# Patient Record
Sex: MALE | Race: WHITE | Employment: FULL TIME | URBAN - NONMETROPOLITAN AREA
[De-identification: names, ages, dates, MRNs, and addresses within clinical notes are randomized per-mention and may not be internally consistent; named-entity substitution may affect disease eponyms.]

---

## 2017-02-13 ENCOUNTER — OFFICE VISIT (OUTPATIENT)
Dept: PULMONOLOGY | Age: 62
End: 2017-02-13

## 2017-02-13 VITALS
WEIGHT: 193.4 LBS | DIASTOLIC BLOOD PRESSURE: 78 MMHG | OXYGEN SATURATION: 96 % | HEIGHT: 66 IN | HEART RATE: 67 BPM | BODY MASS INDEX: 31.08 KG/M2 | SYSTOLIC BLOOD PRESSURE: 122 MMHG

## 2017-02-13 DIAGNOSIS — G47.33 OSA (OBSTRUCTIVE SLEEP APNEA): ICD-10-CM

## 2017-02-13 DIAGNOSIS — G47.09 OTHER INSOMNIA: ICD-10-CM

## 2017-02-13 DIAGNOSIS — G47.33 OBSTRUCTIVE SLEEP APNEA ON CPAP: Primary | ICD-10-CM

## 2017-02-13 DIAGNOSIS — Z99.89 OBSTRUCTIVE SLEEP APNEA ON CPAP: Primary | ICD-10-CM

## 2017-02-13 PROBLEM — G47.00 INSOMNIA: Status: ACTIVE | Noted: 2017-02-13

## 2017-02-13 PROCEDURE — 99213 OFFICE O/P EST LOW 20 MIN: CPT | Performed by: PHYSICIAN ASSISTANT

## 2017-03-28 ENCOUNTER — PATIENT MESSAGE (OUTPATIENT)
Dept: FAMILY MEDICINE CLINIC | Age: 62
End: 2017-03-28

## 2017-03-28 DIAGNOSIS — Z00.00 ANNUAL PHYSICAL EXAM: Primary | ICD-10-CM

## 2017-03-31 LAB
ALBUMIN SERPL-MCNC: 4.3 G/DL (ref 3.2–5.3)
ALK PHOSPHATASE: 89 IU/L (ref 35–121)
ALT SERPL-CCNC: 27 IU/L (ref 5–59)
ANION GAP SERPL CALCULATED.3IONS-SCNC: 11 MMOL/L
AST SERPL-CCNC: 19 IU/L (ref 10–42)
BILIRUB SERPL-MCNC: 1.1 MG/DL (ref 0.2–1.3)
BUN BLDV-MCNC: 22 MG/DL (ref 10–20)
CALCIUM SERPL-MCNC: 9.5 MG/DL (ref 8.7–10.8)
CHLORIDE BLD-SCNC: 103 MMOL/L (ref 95–111)
CHOLESTEROL/HDL RATIO: 3.5
CHOLESTEROL: 165 MG/DL
CO2: 31 MMOL/L (ref 21–32)
CREAT SERPL-MCNC: 1 MG/DL (ref 0.5–1.3)
EGFR AFRICAN AMERICAN: 92
EGFR IF NONAFRICAN AMERICAN: 76
GLUCOSE: 114 MG/DL (ref 70–100)
HDLC SERPL-MCNC: 47 MG/DL (ref 40–60)
LDL CHOLESTEROL CALCULATED: 79 MG/DL
LDL/HDL RATIO: 1.7
POTASSIUM SERPL-SCNC: 4.3 MMOL/L (ref 3.5–5.4)
PSA, ULTRASENSITIVE: 0.97 NG/ML
SODIUM BLD-SCNC: 141 MMOL/L (ref 134–147)
TOTAL PROTEIN: 6.4 G/DL (ref 5.8–8)
TRIGL SERPL-MCNC: 196 MG/DL
VLDLC SERPL CALC-MCNC: 39 MG/DL

## 2017-04-01 LAB
AVERAGE GLUCOSE: 117 MG/DL (ref 66–114)
HBA1C MFR BLD: 5.7 % (ref 4.2–5.8)

## 2017-04-03 DIAGNOSIS — E78.5 HYPERLIPIDEMIA: ICD-10-CM

## 2017-04-03 RX ORDER — ATORVASTATIN CALCIUM 10 MG/1
TABLET, FILM COATED ORAL
Qty: 30 TABLET | Refills: 11 | Status: SHIPPED | OUTPATIENT
Start: 2017-04-03 | End: 2018-04-05 | Stop reason: SDUPTHER

## 2017-04-04 ENCOUNTER — OFFICE VISIT (OUTPATIENT)
Dept: FAMILY MEDICINE CLINIC | Age: 62
End: 2017-04-04

## 2017-04-04 VITALS
HEIGHT: 67 IN | SYSTOLIC BLOOD PRESSURE: 134 MMHG | WEIGHT: 197.4 LBS | TEMPERATURE: 98.4 F | BODY MASS INDEX: 30.98 KG/M2 | HEART RATE: 68 BPM | DIASTOLIC BLOOD PRESSURE: 80 MMHG | RESPIRATION RATE: 16 BRPM

## 2017-04-04 DIAGNOSIS — K21.9 GASTROESOPHAGEAL REFLUX DISEASE, ESOPHAGITIS PRESENCE NOT SPECIFIED: ICD-10-CM

## 2017-04-04 DIAGNOSIS — Z99.89 OBSTRUCTIVE SLEEP APNEA ON CPAP: ICD-10-CM

## 2017-04-04 DIAGNOSIS — G47.33 OBSTRUCTIVE SLEEP APNEA ON CPAP: ICD-10-CM

## 2017-04-04 DIAGNOSIS — E78.5 HYPERLIPIDEMIA, UNSPECIFIED HYPERLIPIDEMIA TYPE: ICD-10-CM

## 2017-04-04 DIAGNOSIS — Z00.00 WELL ADULT EXAM: Primary | ICD-10-CM

## 2017-04-04 DIAGNOSIS — R73.01 IFG (IMPAIRED FASTING GLUCOSE): ICD-10-CM

## 2017-04-04 DIAGNOSIS — N40.1 BENIGN NON-NODULAR PROSTATIC HYPERPLASIA WITH LOWER URINARY TRACT SYMPTOMS: ICD-10-CM

## 2017-04-04 DIAGNOSIS — J30.9 ALLERGIC RHINITIS, UNSPECIFIED ALLERGIC RHINITIS TRIGGER, UNSPECIFIED RHINITIS SEASONALITY: ICD-10-CM

## 2017-04-04 PROCEDURE — 99396 PREV VISIT EST AGE 40-64: CPT | Performed by: FAMILY MEDICINE

## 2017-04-04 ASSESSMENT — ENCOUNTER SYMPTOMS
CONSTIPATION: 1
VOMITING: 0
BLOOD IN STOOL: 1
CHEST TIGHTNESS: 0
NAUSEA: 0
ABDOMINAL PAIN: 0
ANAL BLEEDING: 0
DIARRHEA: 0
SHORTNESS OF BREATH: 0

## 2017-06-07 ENCOUNTER — OFFICE VISIT (OUTPATIENT)
Dept: FAMILY MEDICINE CLINIC | Age: 62
End: 2017-06-07

## 2017-06-07 ENCOUNTER — TELEPHONE (OUTPATIENT)
Dept: FAMILY MEDICINE CLINIC | Age: 62
End: 2017-06-07

## 2017-06-07 VITALS
RESPIRATION RATE: 26 BRPM | HEART RATE: 92 BPM | DIASTOLIC BLOOD PRESSURE: 70 MMHG | BODY MASS INDEX: 30.54 KG/M2 | WEIGHT: 194.6 LBS | TEMPERATURE: 101.3 F | HEIGHT: 67 IN | SYSTOLIC BLOOD PRESSURE: 116 MMHG

## 2017-06-07 DIAGNOSIS — R05.9 COUGH: Primary | ICD-10-CM

## 2017-06-07 DIAGNOSIS — R68.83 CHILLS: ICD-10-CM

## 2017-06-07 DIAGNOSIS — R50.9 FEVER, UNSPECIFIED FEVER CAUSE: ICD-10-CM

## 2017-06-07 LAB
INFLUENZA A ANTIBODY: NEGATIVE
INFLUENZA B ANTIBODY: NEGATIVE

## 2017-06-07 PROCEDURE — 87804 INFLUENZA ASSAY W/OPTIC: CPT | Performed by: FAMILY MEDICINE

## 2017-06-07 PROCEDURE — 99213 OFFICE O/P EST LOW 20 MIN: CPT | Performed by: FAMILY MEDICINE

## 2017-06-07 RX ORDER — HYDROCODONE POLISTIREX AND CHLORPHENIRAMINE POLISTIREX 10; 8 MG/5ML; MG/5ML
5 SUSPENSION, EXTENDED RELEASE ORAL EVERY 12 HOURS PRN
Qty: 120 ML | Refills: 0 | Status: SHIPPED | OUTPATIENT
Start: 2017-06-07 | End: 2018-02-19

## 2017-06-07 RX ORDER — LEVOFLOXACIN 750 MG/1
750 TABLET ORAL DAILY
Qty: 7 TABLET | Refills: 0 | Status: SHIPPED | OUTPATIENT
Start: 2017-06-07 | End: 2017-06-14

## 2017-06-07 ASSESSMENT — ENCOUNTER SYMPTOMS
SHORTNESS OF BREATH: 0
BLOOD IN STOOL: 0
VOMITING: 0
EYE ITCHING: 0
CONSTIPATION: 0
SORE THROAT: 1
RHINORRHEA: 1
EYE DISCHARGE: 0
SINUS PRESSURE: 0
COUGH: 1
EYE PAIN: 0
DIARRHEA: 0
NAUSEA: 0
CHEST TIGHTNESS: 0
ANAL BLEEDING: 0
ABDOMINAL PAIN: 0

## 2017-06-07 ASSESSMENT — PATIENT HEALTH QUESTIONNAIRE - PHQ9
SUM OF ALL RESPONSES TO PHQ9 QUESTIONS 1 & 2: 0
2. FEELING DOWN, DEPRESSED OR HOPELESS: 0
1. LITTLE INTEREST OR PLEASURE IN DOING THINGS: 0
SUM OF ALL RESPONSES TO PHQ QUESTIONS 1-9: 0

## 2017-06-08 ENCOUNTER — TELEPHONE (OUTPATIENT)
Dept: FAMILY MEDICINE CLINIC | Age: 62
End: 2017-06-08

## 2017-07-19 DIAGNOSIS — G47.09 OTHER INSOMNIA: ICD-10-CM

## 2017-07-19 DIAGNOSIS — G47.33 OSA (OBSTRUCTIVE SLEEP APNEA): ICD-10-CM

## 2017-07-19 RX ORDER — ESZOPICLONE 2 MG/1
TABLET, FILM COATED ORAL
Qty: 30 TABLET | Refills: 4 | Status: SHIPPED | OUTPATIENT
Start: 2017-07-19 | End: 2018-02-19 | Stop reason: SDUPTHER

## 2017-10-04 RX ORDER — TAMSULOSIN HYDROCHLORIDE 0.4 MG/1
CAPSULE ORAL
Qty: 180 CAPSULE | Refills: 3 | Status: SHIPPED | OUTPATIENT
Start: 2017-10-04 | End: 2018-11-11 | Stop reason: SDUPTHER

## 2017-12-15 ENCOUNTER — TELEPHONE (OUTPATIENT)
Dept: FAMILY MEDICINE CLINIC | Age: 62
End: 2017-12-15

## 2017-12-15 NOTE — TELEPHONE ENCOUNTER
Would not recommend antibiotic at this time, given duration of symptoms and lack of fever as this sounds viral in etiology. If concerned re Influenza, would need eval in ED to be tested within 48 hours of onset. OK to continue OTC symptomatic treatment otherwise.  ES

## 2017-12-15 NOTE — TELEPHONE ENCOUNTER
Patient is calling in because since Wednesday 12/13/17 he has had just a little sinus drainage and a cough. He said his temperature is running around 99. He has been taking the cough syrup that he was prescribed a few months ago, and that seems to be helping. He was wanting to know if Dr Rhoda Gonzalez thought he may needs a prescription antibiotic, or should he wait it out. Please advise.

## 2018-02-19 ENCOUNTER — OFFICE VISIT (OUTPATIENT)
Dept: PULMONOLOGY | Age: 63
End: 2018-02-19
Payer: COMMERCIAL

## 2018-02-19 VITALS
WEIGHT: 194 LBS | HEART RATE: 58 BPM | BODY MASS INDEX: 30.45 KG/M2 | DIASTOLIC BLOOD PRESSURE: 72 MMHG | OXYGEN SATURATION: 97 % | HEIGHT: 67 IN | SYSTOLIC BLOOD PRESSURE: 122 MMHG

## 2018-02-19 DIAGNOSIS — Z99.89 OBSTRUCTIVE SLEEP APNEA ON CPAP: Primary | ICD-10-CM

## 2018-02-19 DIAGNOSIS — G47.33 OSA (OBSTRUCTIVE SLEEP APNEA): ICD-10-CM

## 2018-02-19 DIAGNOSIS — G47.33 OBSTRUCTIVE SLEEP APNEA ON CPAP: Primary | ICD-10-CM

## 2018-02-19 DIAGNOSIS — G47.09 OTHER INSOMNIA: ICD-10-CM

## 2018-02-19 PROCEDURE — 99213 OFFICE O/P EST LOW 20 MIN: CPT | Performed by: PHYSICIAN ASSISTANT

## 2018-02-19 RX ORDER — ESZOPICLONE 2 MG/1
TABLET, FILM COATED ORAL
Qty: 30 TABLET | Refills: 5 | Status: SHIPPED | OUTPATIENT
Start: 2018-02-19 | End: 2018-06-13

## 2018-02-19 ASSESSMENT — ENCOUNTER SYMPTOMS
ORTHOPNEA: 0
SINUS PAIN: 0
HEARTBURN: 0
WHEEZING: 0
SPUTUM PRODUCTION: 0
COUGH: 0
SORE THROAT: 0
NAUSEA: 0
RESPIRATORY NEGATIVE: 1
EYES NEGATIVE: 1
SHORTNESS OF BREATH: 0
GASTROINTESTINAL NEGATIVE: 1

## 2018-02-19 NOTE — PROGRESS NOTES
[facility]      Kimmie Rm                                         [id number]  2/19/2018   Chief Complaint   Patient presents with    Sleep Apnea     1 yr f/u download    Medication Refill     Lunesta        Pt of Dr. Aldo Matamoros    PAP Download:   Original or initial  AHI: 28.4     Date of initial study: 11/2013    [x] Compliant  90%   []  Noncompliant 10 %     PAP Type CPAP   Level  9.0   Avg Hrs/Day 5:49:29  AHI: 0.9   Recorded compliance dates , 1/20/18  to 2/18/18   Machine/Mfg: Respironics Interface: Nasal    Provider:  [x][facility]  [][facility]  [][facility]  [][facility]         [][facility] []Other:   Neck Size: 16.5\"  Mallampati Mallampati 1  ESS:  16    Here is a scan of the most recent download:        Presentation:   Marysol Figueroa presents for sleep medicine follow up for obstructive sleep apnea  Since the last visit, Marysol Figueroa is doing reasonably well with their sleep machine. Other comments: he is wearing PAP. He is using [address] or Lunesta for insomnia    Equipment issues: The pressure is  acceptable, the mask is acceptable and he  is  using the humidity. Sleep issues:  Do you feel better? Yes and No  More rested? Sometimes   Better concentration? no    Progress History:   Since last visit any new medical issues? No  New ER or hospitlal visits? No  Any new or changes in medicines? No  Any new sleep medicines?  No        Past Medical History:   Diagnosis Date    Allergic     Allergic rhinitis     Arthritis     GERD (gastroesophageal reflux disease)     Hyperlipidemia     Prostatism     Unspecified sleep apnea        Past Surgical History:   Procedure Laterality Date    COLONOSCOPY  2011    EYE SURGERY      RETINAL DETACHMENT SURGERY         Social History   Substance Use Topics    Smoking status: Never Smoker    Smokeless tobacco: Never Used    Alcohol use Yes     1 Shots of liquor per week      Comment: less than 1 oz av./wk       Allergies   Allergen Reactions    Negative for joint pain and myalgias. Skin: Negative. Neurological: Negative. Negative for dizziness, weakness and headaches. Endo/Heme/Allergies: Negative. Psychiatric/Behavioral: Negative. Negative for depression. The patient is not nervous/anxious and does not have insomnia. All other systems reviewed and are negative. Physical Exam:    BMI:  Body mass index is 30.16 kg/m². Wt Readings from Last 3 Encounters:   02/19/18 194 lb (88 kg)   06/07/17 194 lb 9.6 oz (88.3 kg)   04/04/17 197 lb 6.4 oz (89.5 kg)     Vitals: /72   Pulse 58   Ht 5' 7.25\" (1.708 m)   Wt 194 lb (88 kg)   SpO2 97% Comment: RA at rest  BMI 30.16 kg/m²       Physical Exam   Constitutional: He is oriented to person, place, and time and well-developed, well-nourished, and in no distress. No distress. HENT:   Head: Normocephalic and atraumatic. Mouth/Throat: Oropharynx is clear and moist. No oropharyngeal exudate. Eyes: Pupils are equal, round, and reactive to light. Neck: Normal range of motion. Neck supple. Cardiovascular: Normal rate, regular rhythm and normal heart sounds. Pulmonary/Chest: Effort normal and breath sounds normal. No stridor. No respiratory distress. Abdominal: Soft. Bowel sounds are normal.   Musculoskeletal: Normal range of motion. He exhibits no edema. Neurological: He is alert and oriented to person, place, and time. Gait normal.   Skin: Skin is warm and dry. He is not diaphoretic. Psychiatric: Mood, memory, affect and judgment normal.         ASSESSMENT/DIAGNOSIS    1. Obstructive sleep apnea on CPAP     2. Other insomnia  eszopiclone (LUNESTA) 2 MG TABS   3. SUNIL (obstructive sleep apnea)  eszopiclone (LUNESTA) 2 MG TABS            Plan   Do you need any equipment today? Yes update supplies and renew Lunesta  - Consider CBT-I  - He  was advised to continue current positive airway pressure therapy with above described pressure.    - He  advised to keep good compliance with

## 2018-03-29 ENCOUNTER — TELEPHONE (OUTPATIENT)
Dept: FAMILY MEDICINE CLINIC | Age: 63
End: 2018-03-29

## 2018-03-29 DIAGNOSIS — Z00.00 LABORATORY EXAM ORDERED AS PART OF ROUTINE GENERAL MEDICAL EXAMINATION: Primary | ICD-10-CM

## 2018-03-29 DIAGNOSIS — Z12.5 SCREENING PSA (PROSTATE SPECIFIC ANTIGEN): ICD-10-CM

## 2018-03-29 DIAGNOSIS — R73.01 IFG (IMPAIRED FASTING GLUCOSE): ICD-10-CM

## 2018-04-02 LAB
ALBUMIN SERPL-MCNC: 4.4 G/DL (ref 3.5–5.2)
ALK PHOSPHATASE: 89 U/L (ref 39–118)
ALT SERPL-CCNC: 20 U/L (ref 5–50)
ANION GAP SERPL CALCULATED.3IONS-SCNC: 16 MEQ/L (ref 10–19)
AST SERPL-CCNC: 13 U/L (ref 9–50)
BILIRUB SERPL-MCNC: 0.9 MG/DL
BUN BLDV-MCNC: 18 MG/DL (ref 8–23)
CALCIUM SERPL-MCNC: 9.4 MG/DL (ref 8.5–10.5)
CHLORIDE BLD-SCNC: 100 MEQ/L (ref 95–107)
CHOLESTEROL/HDL RATIO: 4
CHOLESTEROL: 171 MG/DL
CO2: 27 MEQ/L (ref 19–31)
CREAT SERPL-MCNC: 1.1 MG/DL (ref 0.8–1.4)
EGFR AFRICAN AMERICAN: 82.4 ML/MIN/1.73 M2
EGFR IF NONAFRICAN AMERICAN: 71.1 ML/MIN/1.73 M2
GLUCOSE: 113 MG/DL (ref 70–99)
HDLC SERPL-MCNC: 43 MG/DL
LDL CHOLESTEROL CALCULATED: 87 MG/DL
LDL/HDL RATIO: 2
POTASSIUM SERPL-SCNC: 4.3 MEQ/L (ref 3.5–5.4)
PSA, ULTRASENSITIVE: 1.15 NG/ML
SODIUM BLD-SCNC: 143 MEQ/L (ref 135–146)
TOTAL PROTEIN: 6.7 G/DL (ref 6.1–8.3)
TRIGL SERPL-MCNC: 205 MG/DL
VLDLC SERPL CALC-MCNC: 41 MG/DL

## 2018-04-03 LAB
AVERAGE GLUCOSE: 117 MG/DL (ref 66–114)
HBA1C MFR BLD: 5.7 % (ref 4.2–5.8)

## 2018-04-05 ENCOUNTER — OFFICE VISIT (OUTPATIENT)
Dept: FAMILY MEDICINE CLINIC | Age: 63
End: 2018-04-05
Payer: COMMERCIAL

## 2018-04-05 VITALS
DIASTOLIC BLOOD PRESSURE: 82 MMHG | TEMPERATURE: 97.6 F | BODY MASS INDEX: 30.67 KG/M2 | HEIGHT: 67 IN | SYSTOLIC BLOOD PRESSURE: 130 MMHG | WEIGHT: 195.4 LBS | RESPIRATION RATE: 16 BRPM | HEART RATE: 72 BPM

## 2018-04-05 DIAGNOSIS — R73.01 IFG (IMPAIRED FASTING GLUCOSE): ICD-10-CM

## 2018-04-05 DIAGNOSIS — Z00.00 WELL ADULT EXAM: Primary | ICD-10-CM

## 2018-04-05 DIAGNOSIS — G47.33 OBSTRUCTIVE SLEEP APNEA ON CPAP: ICD-10-CM

## 2018-04-05 DIAGNOSIS — Z99.89 OBSTRUCTIVE SLEEP APNEA ON CPAP: ICD-10-CM

## 2018-04-05 DIAGNOSIS — N40.1 BENIGN NON-NODULAR PROSTATIC HYPERPLASIA WITH LOWER URINARY TRACT SYMPTOMS: ICD-10-CM

## 2018-04-05 DIAGNOSIS — J30.9 CHRONIC ALLERGIC RHINITIS, UNSPECIFIED SEASONALITY, UNSPECIFIED TRIGGER: ICD-10-CM

## 2018-04-05 DIAGNOSIS — F43.23 ADJUSTMENT DISORDER WITH MIXED ANXIETY AND DEPRESSED MOOD: ICD-10-CM

## 2018-04-05 DIAGNOSIS — E78.5 HYPERLIPIDEMIA, UNSPECIFIED HYPERLIPIDEMIA TYPE: ICD-10-CM

## 2018-04-05 PROCEDURE — 99396 PREV VISIT EST AGE 40-64: CPT | Performed by: FAMILY MEDICINE

## 2018-04-05 RX ORDER — ESCITALOPRAM OXALATE 10 MG/1
10 TABLET ORAL DAILY
Qty: 30 TABLET | Refills: 1 | Status: SHIPPED | OUTPATIENT
Start: 2018-04-05 | End: 2018-06-28 | Stop reason: SDUPTHER

## 2018-04-05 RX ORDER — ATORVASTATIN CALCIUM 10 MG/1
TABLET, FILM COATED ORAL
Qty: 90 TABLET | Refills: 3 | Status: SHIPPED | OUTPATIENT
Start: 2018-04-05 | End: 2019-04-08 | Stop reason: SDUPTHER

## 2018-04-05 RX ORDER — TADALAFIL 5 MG/1
5 TABLET ORAL PRN
COMMUNITY
End: 2019-04-08 | Stop reason: SDUPTHER

## 2018-04-05 ASSESSMENT — ENCOUNTER SYMPTOMS
DIARRHEA: 0
ANAL BLEEDING: 0
ABDOMINAL PAIN: 0
CHEST TIGHTNESS: 0
VOMITING: 0
CONSTIPATION: 0
SHORTNESS OF BREATH: 0
NAUSEA: 0
BLOOD IN STOOL: 0

## 2018-05-17 ENCOUNTER — OFFICE VISIT (OUTPATIENT)
Dept: FAMILY MEDICINE CLINIC | Age: 63
End: 2018-05-17
Payer: COMMERCIAL

## 2018-05-17 VITALS
WEIGHT: 191.2 LBS | HEART RATE: 72 BPM | TEMPERATURE: 98.1 F | DIASTOLIC BLOOD PRESSURE: 80 MMHG | BODY MASS INDEX: 29.72 KG/M2 | SYSTOLIC BLOOD PRESSURE: 118 MMHG | RESPIRATION RATE: 16 BRPM

## 2018-05-17 DIAGNOSIS — R73.01 IFG (IMPAIRED FASTING GLUCOSE): ICD-10-CM

## 2018-05-17 DIAGNOSIS — F43.23 ADJUSTMENT DISORDER WITH MIXED ANXIETY AND DEPRESSED MOOD: Primary | ICD-10-CM

## 2018-05-17 DIAGNOSIS — E78.5 HYPERLIPIDEMIA, UNSPECIFIED HYPERLIPIDEMIA TYPE: ICD-10-CM

## 2018-05-17 PROCEDURE — 99213 OFFICE O/P EST LOW 20 MIN: CPT | Performed by: FAMILY MEDICINE

## 2018-05-17 ASSESSMENT — ENCOUNTER SYMPTOMS
BLOOD IN STOOL: 0
CHEST TIGHTNESS: 0
ABDOMINAL PAIN: 0
DIARRHEA: 0
SHORTNESS OF BREATH: 0
CONSTIPATION: 0
VOMITING: 0
ANAL BLEEDING: 0
NAUSEA: 0

## 2018-05-30 DIAGNOSIS — F43.23 ADJUSTMENT DISORDER WITH MIXED ANXIETY AND DEPRESSED MOOD: ICD-10-CM

## 2018-06-01 RX ORDER — ESCITALOPRAM OXALATE 10 MG/1
TABLET ORAL
Qty: 30 TABLET | Refills: 0 | OUTPATIENT
Start: 2018-06-01

## 2018-06-01 RX ORDER — ESCITALOPRAM OXALATE 5 MG/1
5 TABLET ORAL DAILY
Qty: 90 TABLET | Refills: 3 | Status: SHIPPED | OUTPATIENT
Start: 2018-06-01 | End: 2018-06-28 | Stop reason: SDUPTHER

## 2018-06-13 DIAGNOSIS — G47.09 OTHER INSOMNIA: Primary | ICD-10-CM

## 2018-06-13 RX ORDER — ESZOPICLONE 1 MG/1
1 TABLET, FILM COATED ORAL NIGHTLY
Qty: 30 TABLET | Refills: 0 | Status: SHIPPED | OUTPATIENT
Start: 2018-06-13 | End: 2019-01-23 | Stop reason: SDUPTHER

## 2018-06-28 ENCOUNTER — OFFICE VISIT (OUTPATIENT)
Dept: FAMILY MEDICINE CLINIC | Age: 63
End: 2018-06-28
Payer: COMMERCIAL

## 2018-06-28 VITALS
WEIGHT: 196.2 LBS | TEMPERATURE: 98 F | BODY MASS INDEX: 30.5 KG/M2 | HEART RATE: 68 BPM | SYSTOLIC BLOOD PRESSURE: 124 MMHG | RESPIRATION RATE: 12 BRPM | DIASTOLIC BLOOD PRESSURE: 74 MMHG

## 2018-06-28 DIAGNOSIS — F43.21 ADJUSTMENT DISORDER WITH DEPRESSED MOOD: Primary | ICD-10-CM

## 2018-06-28 DIAGNOSIS — E78.5 HYPERLIPIDEMIA, UNSPECIFIED HYPERLIPIDEMIA TYPE: ICD-10-CM

## 2018-06-28 DIAGNOSIS — R73.01 IFG (IMPAIRED FASTING GLUCOSE): ICD-10-CM

## 2018-06-28 PROCEDURE — 99213 OFFICE O/P EST LOW 20 MIN: CPT | Performed by: FAMILY MEDICINE

## 2018-06-28 RX ORDER — BUPROPION HYDROCHLORIDE 150 MG/1
150 TABLET ORAL EVERY MORNING
Qty: 30 TABLET | Refills: 1 | Status: SHIPPED | OUTPATIENT
Start: 2018-06-28 | End: 2018-08-17 | Stop reason: SDUPTHER

## 2018-06-28 ASSESSMENT — ENCOUNTER SYMPTOMS
SHORTNESS OF BREATH: 0
BLOOD IN STOOL: 0
NAUSEA: 0
CHEST TIGHTNESS: 0
VOMITING: 0
ANAL BLEEDING: 0
DIARRHEA: 0
ABDOMINAL PAIN: 0
CONSTIPATION: 0

## 2018-08-17 ENCOUNTER — OFFICE VISIT (OUTPATIENT)
Dept: FAMILY MEDICINE CLINIC | Age: 63
End: 2018-08-17
Payer: COMMERCIAL

## 2018-08-17 VITALS
HEART RATE: 64 BPM | DIASTOLIC BLOOD PRESSURE: 84 MMHG | TEMPERATURE: 98.6 F | SYSTOLIC BLOOD PRESSURE: 120 MMHG | WEIGHT: 193 LBS | BODY MASS INDEX: 30 KG/M2 | RESPIRATION RATE: 16 BRPM

## 2018-08-17 DIAGNOSIS — G47.09 OTHER INSOMNIA: ICD-10-CM

## 2018-08-17 DIAGNOSIS — F43.21 ADJUSTMENT DISORDER WITH DEPRESSED MOOD: Primary | ICD-10-CM

## 2018-08-17 PROCEDURE — 99213 OFFICE O/P EST LOW 20 MIN: CPT | Performed by: FAMILY MEDICINE

## 2018-08-17 RX ORDER — ESZOPICLONE 2 MG/1
1 TABLET, FILM COATED ORAL NIGHTLY PRN
COMMUNITY
End: 2019-01-23

## 2018-08-17 RX ORDER — BUPROPION HYDROCHLORIDE 150 MG/1
150 TABLET ORAL EVERY MORNING
Qty: 90 TABLET | Refills: 1 | Status: SHIPPED | OUTPATIENT
Start: 2018-08-17 | End: 2019-01-23 | Stop reason: SDUPTHER

## 2018-08-17 RX ORDER — TRAZODONE HYDROCHLORIDE 50 MG/1
25-50 TABLET ORAL NIGHTLY
Qty: 30 TABLET | Refills: 1 | Status: SHIPPED | OUTPATIENT
Start: 2018-08-17 | End: 2018-09-27 | Stop reason: SDUPTHER

## 2018-08-17 ASSESSMENT — PATIENT HEALTH QUESTIONNAIRE - PHQ9
SUM OF ALL RESPONSES TO PHQ9 QUESTIONS 1 & 2: 0
1. LITTLE INTEREST OR PLEASURE IN DOING THINGS: 0
SUM OF ALL RESPONSES TO PHQ QUESTIONS 1-9: 0
2. FEELING DOWN, DEPRESSED OR HOPELESS: 0
SUM OF ALL RESPONSES TO PHQ QUESTIONS 1-9: 0

## 2018-08-17 ASSESSMENT — ENCOUNTER SYMPTOMS
SHORTNESS OF BREATH: 0
VOMITING: 0
BLOOD IN STOOL: 0
DIARRHEA: 0
CHEST TIGHTNESS: 0
ABDOMINAL PAIN: 0
NAUSEA: 0
ANAL BLEEDING: 0
CONSTIPATION: 0

## 2018-08-17 NOTE — PROGRESS NOTES
Conjunctivae and EOM are normal.   Neck: Normal range of motion. Neck supple. Cardiovascular: Normal rate, regular rhythm and intact distal pulses. Pulmonary/Chest: Effort normal and breath sounds normal.   Abdominal: Soft. Bowel sounds are normal.   Musculoskeletal: Normal range of motion. Neurological: He is alert and oriented to person, place, and time. He has normal reflexes. Skin: Skin is warm and dry. Psychiatric: He has a normal mood and affect. His behavior is normal. Judgment and thought content normal.   Nursing note and vitals reviewed.       Lab Results   Component Value Date    LABA1C 5.7 04/02/2018       Lab Results   Component Value Date    CHOL 171 04/02/2018    TRIG 205 (H) 04/02/2018    HDL 43 04/02/2018    LDLCALC 87 04/02/2018    LDLDIRECT 91 04/09/2013       Lab Results   Component Value Date     04/02/2018    K 4.3 04/02/2018     04/02/2018    CO2 27 04/02/2018    BUN 18 04/02/2018    CREATININE 1.1 04/02/2018    GLUCOSE 113 (H) 04/02/2018    CALCIUM 9.4 04/02/2018    PROT 6.7 04/02/2018    LABALBU 4.4 04/02/2018    BILITOT 0.9 04/02/2018    ALKPHOS 89 04/02/2018    AST 13 04/02/2018    ALT 20 04/02/2018       Lab Results   Component Value Date    WBC 6.4 06/07/2017    HGB 14.4 06/07/2017    HCT 44.7 06/07/2017    MCV 90.2 06/07/2017     06/07/2017     Component      Latest Ref Rng & Units 4/2/2018 3/31/2017 5/24/2016 1/27/2015           8:10 AM  7:30 AM  8:25 AM  8:20 AM   Prostatic Specific Ag      ng/mL           PSA, Ultrasensitive      <=4.00 ng/mL 1.150 0.97 1.14 1.00      Component      Latest Ref Rng & Units 1/29/2014 11/14/2012          12:00 AM 12:00 AM   Prostatic Specific Ag      ng/mL 0.98 1.26   PSA, Ultrasensitive      <=4.00 ng/mL                Immunization History   Administered Date(s) Administered    Tdap (Boostrix, Adacel) 07/25/2013    Zoster Live (Zostavax) 01/25/2013       Health Maintenance   Topic Date Due    Shingles Vaccine (1 of 2 - 2 Dose Series) 03/25/2013    Hepatitis C screen  04/05/2020 (Originally 1955)    HIV screen  04/05/2019 (Originally 11/27/1970)    Flu vaccine (1) 09/01/2018    A1C test (Diabetic or Prediabetic)  04/02/2019    Colon cancer screen colonoscopy  04/08/2021    Lipid screen  04/02/2023    DTaP/Tdap/Td vaccine (2 - Td) 07/25/2023       AAA ultrasound (Male, 65-75, smoked ever) indicated at this time? NO  CT Lung Screen (55-80, 30 pk-yrs, smoking or quit <15 years) indicated at this time? NO tobacco history. Future Appointments  Date Time Provider Jenelle Haney   10/23/2018 12:15 PM Manny Nguyen MD [address]   2/18/2019 10:30 AM Lilibeth Ramsey PA-C Pul Med [address]   4/8/2019 8:45 AM Manny Nguyne MD [address]       ASSESSMENT       Diagnosis Orders   1. Adjustment disorder with depressed mood  traZODone (DESYREL) 50 MG tablet    buPROPion (WELLBUTRIN XL) 150 MG extended release tablet   2. Other insomnia  traZODone (DESYREL) 50 MG tablet       PLAN      Encouraged annual FLU SHOT- pt declines. (updated 8/17/2018)  Encouraged NEW SHINGLES SHOT [facility]) - check with insurance re coverage and location of administration. COLONOSCOPY done 4/8/2016 per Dr. Dai Khanna- to do again in 2026. After discussion with pt, will continue Wellbutrin XL- 150 mg- 1 pill daily. #90/1 refills. After discussion with pt, will hold further Lunesta at this time and start Trazodone 50 mg- 1/2-1 pill nightly for insomnia. #30/1 refill  Call update in 1-2 weeks and again in 6 weeks. Follow up in 5 months for recheck.          Electronically signed by Manny Nguyen MD on 8/17/2018 at 12:26 PM

## 2018-09-27 DIAGNOSIS — F43.21 ADJUSTMENT DISORDER WITH DEPRESSED MOOD: ICD-10-CM

## 2018-09-27 DIAGNOSIS — G47.09 OTHER INSOMNIA: ICD-10-CM

## 2018-09-28 RX ORDER — TRAZODONE HYDROCHLORIDE 50 MG/1
TABLET ORAL
Qty: 30 TABLET | Refills: 0 | Status: SHIPPED | OUTPATIENT
Start: 2018-09-28 | End: 2018-11-27 | Stop reason: SDUPTHER

## 2018-10-31 LAB — TRIGL SERPL-MCNC: 93 MG/DL

## 2018-11-03 LAB
AVERAGE GLUCOSE: 117 MG/DL
HBA1C MFR BLD: 5.7 % (ref 4.2–5.6)

## 2018-11-13 RX ORDER — TAMSULOSIN HYDROCHLORIDE 0.4 MG/1
CAPSULE ORAL
Qty: 180 CAPSULE | Refills: 3 | Status: SHIPPED | OUTPATIENT
Start: 2018-11-13 | End: 2019-04-08 | Stop reason: SDUPTHER

## 2018-11-27 DIAGNOSIS — F43.21 ADJUSTMENT DISORDER WITH DEPRESSED MOOD: ICD-10-CM

## 2018-11-27 DIAGNOSIS — G47.09 OTHER INSOMNIA: ICD-10-CM

## 2018-11-27 RX ORDER — TRAZODONE HYDROCHLORIDE 50 MG/1
TABLET ORAL
Qty: 30 TABLET | Refills: 5 | Status: SHIPPED | OUTPATIENT
Start: 2018-11-27 | End: 2019-10-14 | Stop reason: ALTCHOICE

## 2019-01-19 ENCOUNTER — PATIENT MESSAGE (OUTPATIENT)
Dept: FAMILY MEDICINE CLINIC | Age: 64
End: 2019-01-19

## 2019-01-23 ENCOUNTER — OFFICE VISIT (OUTPATIENT)
Dept: FAMILY MEDICINE CLINIC | Age: 64
End: 2019-01-23
Payer: COMMERCIAL

## 2019-01-23 VITALS
HEART RATE: 76 BPM | RESPIRATION RATE: 16 BRPM | DIASTOLIC BLOOD PRESSURE: 84 MMHG | BODY MASS INDEX: 28.45 KG/M2 | TEMPERATURE: 97.9 F | WEIGHT: 183 LBS | SYSTOLIC BLOOD PRESSURE: 130 MMHG

## 2019-01-23 DIAGNOSIS — F43.21 ADJUSTMENT DISORDER WITH DEPRESSED MOOD: ICD-10-CM

## 2019-01-23 DIAGNOSIS — N40.1 BENIGN NON-NODULAR PROSTATIC HYPERPLASIA WITH LOWER URINARY TRACT SYMPTOMS: ICD-10-CM

## 2019-01-23 DIAGNOSIS — G47.33 OBSTRUCTIVE SLEEP APNEA ON CPAP: ICD-10-CM

## 2019-01-23 DIAGNOSIS — Z99.89 OBSTRUCTIVE SLEEP APNEA ON CPAP: ICD-10-CM

## 2019-01-23 DIAGNOSIS — Z12.5 SCREENING PSA (PROSTATE SPECIFIC ANTIGEN): ICD-10-CM

## 2019-01-23 DIAGNOSIS — K21.9 GASTROESOPHAGEAL REFLUX DISEASE, ESOPHAGITIS PRESENCE NOT SPECIFIED: ICD-10-CM

## 2019-01-23 DIAGNOSIS — G47.09 OTHER INSOMNIA: ICD-10-CM

## 2019-01-23 DIAGNOSIS — E78.5 HYPERLIPIDEMIA, UNSPECIFIED HYPERLIPIDEMIA TYPE: Primary | ICD-10-CM

## 2019-01-23 DIAGNOSIS — R73.01 IFG (IMPAIRED FASTING GLUCOSE): ICD-10-CM

## 2019-01-23 DIAGNOSIS — N52.9 ERECTILE DYSFUNCTION, UNSPECIFIED ERECTILE DYSFUNCTION TYPE: ICD-10-CM

## 2019-01-23 DIAGNOSIS — J30.9 CHRONIC ALLERGIC RHINITIS: ICD-10-CM

## 2019-01-23 PROCEDURE — 99214 OFFICE O/P EST MOD 30 MIN: CPT | Performed by: FAMILY MEDICINE

## 2019-01-23 RX ORDER — BUPROPION HYDROCHLORIDE 150 MG/1
150 TABLET ORAL EVERY MORNING
Qty: 90 TABLET | Refills: 3 | Status: SHIPPED | OUTPATIENT
Start: 2019-01-23 | End: 2020-02-17

## 2019-01-23 RX ORDER — ESZOPICLONE 1 MG/1
1 TABLET, FILM COATED ORAL NIGHTLY
Qty: 30 TABLET | Refills: 0 | Status: SHIPPED | OUTPATIENT
Start: 2019-01-23 | End: 2019-02-22

## 2019-01-23 ASSESSMENT — ENCOUNTER SYMPTOMS
ABDOMINAL PAIN: 0
CONSTIPATION: 1
NAUSEA: 0
DIARRHEA: 0
SHORTNESS OF BREATH: 0
ANAL BLEEDING: 0
CHEST TIGHTNESS: 0
BLOOD IN STOOL: 0
VOMITING: 0

## 2019-02-18 ENCOUNTER — OFFICE VISIT (OUTPATIENT)
Dept: PULMONOLOGY | Age: 64
End: 2019-02-18
Payer: COMMERCIAL

## 2019-02-18 VITALS
DIASTOLIC BLOOD PRESSURE: 80 MMHG | SYSTOLIC BLOOD PRESSURE: 122 MMHG | WEIGHT: 175 LBS | HEIGHT: 67 IN | BODY MASS INDEX: 27.47 KG/M2 | HEART RATE: 62 BPM | OXYGEN SATURATION: 99 %

## 2019-02-18 DIAGNOSIS — G47.33 OBSTRUCTIVE SLEEP APNEA ON CPAP: Primary | ICD-10-CM

## 2019-02-18 DIAGNOSIS — Z99.89 OBSTRUCTIVE SLEEP APNEA ON CPAP: Primary | ICD-10-CM

## 2019-02-18 DIAGNOSIS — G47.09 OTHER INSOMNIA: ICD-10-CM

## 2019-02-18 PROCEDURE — 99213 OFFICE O/P EST LOW 20 MIN: CPT | Performed by: PHYSICIAN ASSISTANT

## 2019-02-18 ASSESSMENT — ENCOUNTER SYMPTOMS
NAUSEA: 0
STRIDOR: 0
SHORTNESS OF BREATH: 0
ALLERGIC/IMMUNOLOGIC NEGATIVE: 1
WHEEZING: 0
BACK PAIN: 0
COUGH: 0
DIARRHEA: 0
CHEST TIGHTNESS: 0
EYES NEGATIVE: 1

## 2019-04-03 LAB
ALBUMIN SERPL-MCNC: 4.5 G/DL (ref 3.2–5.3)
ALK PHOSPHATASE: 85 U/L (ref 39–130)
ALT SERPL-CCNC: 17 U/L (ref 0–40)
ANION GAP SERPL CALCULATED.3IONS-SCNC: 9 MEQ/L (ref 10–19)
AST SERPL-CCNC: 13 U/L (ref 0–41)
AVERAGE GLUCOSE: 117 MG/DL (ref 66–114)
BILIRUB SERPL-MCNC: 0.8 MG/DL (ref 0.3–1.2)
BUN BLDV-MCNC: 19 MG/DL (ref 5–27)
CALCIUM SERPL-MCNC: 9.7 MG/DL (ref 8.5–10.5)
CHLORIDE BLD-SCNC: 101 MMOL/L (ref 98–109)
CHOLESTEROL/HDL RATIO: 2.9 (ref 1–5)
CHOLESTEROL: 144 MG/DL (ref 150–200)
CO2: 32 MMOL/L (ref 22–32)
CREAT SERPL-MCNC: 1.06 MG/DL (ref 0.6–1.3)
EGFR AFRICAN AMERICAN: >60 ML/MIN/1.73SQ.M
EGFR IF NONAFRICAN AMERICAN: >60 ML/MIN/1.73SQ.M
GLUCOSE: 98 MG/DL (ref 65–99)
HBA1C MFR BLD: 5.7 %
HDLC SERPL-MCNC: 50 MG/DL
LDL CHOLESTEROL CALCULATED: 74 MG/DL
POTASSIUM SERPL-SCNC: 4.5 MMOL/L (ref 3.5–5)
PSA, ULTRASENSITIVE: 1.41 NG/ML (ref 0–4)
SODIUM BLD-SCNC: 142 MMOL/L (ref 134–146)
TOTAL PROTEIN: 6.5 G/DL (ref 6–8)
TRIGL SERPL-MCNC: 100 MG/DL (ref 27–150)
VLDLC SERPL CALC-MCNC: 20 MG/DL (ref 0–30)

## 2019-04-08 ENCOUNTER — OFFICE VISIT (OUTPATIENT)
Dept: FAMILY MEDICINE CLINIC | Age: 64
End: 2019-04-08
Payer: COMMERCIAL

## 2019-04-08 VITALS
WEIGHT: 182 LBS | BODY MASS INDEX: 28.29 KG/M2 | TEMPERATURE: 97.7 F | HEART RATE: 64 BPM | RESPIRATION RATE: 16 BRPM | SYSTOLIC BLOOD PRESSURE: 114 MMHG | DIASTOLIC BLOOD PRESSURE: 74 MMHG

## 2019-04-08 DIAGNOSIS — G47.33 OBSTRUCTIVE SLEEP APNEA ON CPAP: ICD-10-CM

## 2019-04-08 DIAGNOSIS — N40.1 BENIGN NON-NODULAR PROSTATIC HYPERPLASIA WITH LOWER URINARY TRACT SYMPTOMS: ICD-10-CM

## 2019-04-08 DIAGNOSIS — E78.5 HYPERLIPIDEMIA, UNSPECIFIED HYPERLIPIDEMIA TYPE: ICD-10-CM

## 2019-04-08 DIAGNOSIS — N52.9 ERECTILE DYSFUNCTION, UNSPECIFIED ERECTILE DYSFUNCTION TYPE: ICD-10-CM

## 2019-04-08 DIAGNOSIS — J30.9 CHRONIC ALLERGIC RHINITIS: ICD-10-CM

## 2019-04-08 DIAGNOSIS — F43.21 ADJUSTMENT DISORDER WITH DEPRESSED MOOD: ICD-10-CM

## 2019-04-08 DIAGNOSIS — K21.9 GASTROESOPHAGEAL REFLUX DISEASE, ESOPHAGITIS PRESENCE NOT SPECIFIED: ICD-10-CM

## 2019-04-08 DIAGNOSIS — Z00.00 WELL ADULT EXAM: Primary | ICD-10-CM

## 2019-04-08 DIAGNOSIS — Z99.89 OBSTRUCTIVE SLEEP APNEA ON CPAP: ICD-10-CM

## 2019-04-08 DIAGNOSIS — R73.01 IFG (IMPAIRED FASTING GLUCOSE): ICD-10-CM

## 2019-04-08 PROCEDURE — 99396 PREV VISIT EST AGE 40-64: CPT | Performed by: FAMILY MEDICINE

## 2019-04-08 RX ORDER — ATORVASTATIN CALCIUM 10 MG/1
TABLET, FILM COATED ORAL
Qty: 90 TABLET | Refills: 3 | Status: SHIPPED | OUTPATIENT
Start: 2019-04-08 | End: 2020-03-24

## 2019-04-08 RX ORDER — TAMSULOSIN HYDROCHLORIDE 0.4 MG/1
CAPSULE ORAL
Qty: 180 CAPSULE | Refills: 3 | Status: SHIPPED | OUTPATIENT
Start: 2019-04-08 | End: 2020-04-21

## 2019-04-08 RX ORDER — TADALAFIL 5 MG/1
5 TABLET ORAL PRN
Qty: 30 TABLET | Refills: 0 | Status: SHIPPED | OUTPATIENT
Start: 2019-04-08 | End: 2020-08-11 | Stop reason: SDUPTHER

## 2019-04-08 RX ORDER — ATORVASTATIN CALCIUM 10 MG/1
TABLET, FILM COATED ORAL
Qty: 30 TABLET | Refills: 2 | OUTPATIENT
Start: 2019-04-08

## 2019-04-08 ASSESSMENT — ENCOUNTER SYMPTOMS
VOMITING: 0
CHEST TIGHTNESS: 0
DIARRHEA: 0
ABDOMINAL PAIN: 0
ANAL BLEEDING: 1
SHORTNESS OF BREATH: 0
BLOOD IN STOOL: 0
NAUSEA: 0
CONSTIPATION: 0

## 2019-04-08 ASSESSMENT — PATIENT HEALTH QUESTIONNAIRE - PHQ9
SUM OF ALL RESPONSES TO PHQ QUESTIONS 1-9: 0
SUM OF ALL RESPONSES TO PHQ9 QUESTIONS 1 & 2: 0
1. LITTLE INTEREST OR PLEASURE IN DOING THINGS: 0
SUM OF ALL RESPONSES TO PHQ QUESTIONS 1-9: 0
2. FEELING DOWN, DEPRESSED OR HOPELESS: 0

## 2019-04-08 NOTE — PROGRESS NOTES
dizziness, light-headedness and headaches. OBJECTIVE     /74 (Site: Left Upper Arm, Position: Sitting, Cuff Size: Medium Adult)   Pulse 64   Temp 97.7 °F (36.5 °C) (Oral)   Resp 16   Wt 182 lb (82.6 kg)   BMI 28.29 kg/m²     Wt Readings from Last 3 Encounters:   04/08/19 182 lb (82.6 kg)   02/18/19 175 lb (79.4 kg)   01/23/19 183 lb (83 kg)     Physical Exam   Constitutional: He is oriented to person, place, and time. He appears well-developed and well-nourished. HENT:   Head: Normocephalic and atraumatic. Right Ear: External ear normal.   Left Ear: External ear normal.   Nose: Nose normal.   Mouth/Throat: Oropharynx is clear and moist.   Eyes: Pupils are equal, round, and reactive to light. Conjunctivae and EOM are normal.   Neck: Normal range of motion. Neck supple. Cardiovascular: Normal rate, regular rhythm and intact distal pulses. Pulmonary/Chest: Effort normal and breath sounds normal.   Abdominal: Soft. Bowel sounds are normal.   Genitourinary:   Genitourinary Comments: ABBY deferred today as pt currently asymptomatic. Pt denies dysuria, hematuria, frequency, urgency, difficulty starting/ stopping stream, frequent nocturia    Will reconsider annually. ES     Musculoskeletal: Normal range of motion. Neurological: He is alert and oriented to person, place, and time. He has normal reflexes. Skin: Skin is warm and dry. Psychiatric: He has a normal mood and affect. His behavior is normal. Judgment and thought content normal.   Nursing note and vitals reviewed.       Component      Latest Ref Rng & Units 4/3/2019   Glucose      65 - 99 mg/dL 98   BUN      5 - 27 mg/dL 19   Creatinine      0.6 - 1.3 mg/dL 1.06   eGFR African American      >59 ml/min/1.73sq.m >60   EGFR IF NonAfrican American      >59 ml/min/1.73sq.m >60   Calcium      8.5 - 10.5 mg/dL 9.7   Sodium      134 - 146 mmol/L 142   Potassium      3.5 - 5.0 mmol/L 4.5   Chloride      98 - 109 mmol/L 101   CO2      22 - 32 mmol/L 32   Anion Gap      10 - 19 mEq/L 9 (L)   Bilirubin      0.3 - 1.2 mg/dL 0.8   Alk Phosphatase      39 - 130 U/L 85   AST      0 - 41 U/L 13   ALT      0 - 40 U/L 17   Total Protein      6.0 - 8.0 g/dL 6.5   Albumin      3.2 - 5.3 g/dL 4.5   Cholesterol      150 - 200 mg/dL 144 (L)   Triglycerides      27 - 150 mg/dL 100   HDL Cholesterol      >39 mg/dL 50   LDL Calculated      <130 mg/dL 74   VLDL Cholesterol Calculated      0 - 30 mg/dL 20   Chol/HDL Ratio      1.0 - 5.0 2.9   Hemoglobin A1C      <5.5 % 5.7 (H)   AVERAGE GLUCOSE      66 - 114 mg/dL 117 (H)   PSA, Ultrasensitive      0.00 - 4.00 ng/mL 1.41       Component      Latest Ref Rng & Units 4/3/2019 4/2/2018 3/31/2017 5/24/2016   Prostatic Specific Ag      ng/mL       PSA, Ultrasensitive      0.00 - 4.00 ng/mL 1.41 1.150 0.97 1.14     Component      Latest Ref Rng & Units 1/27/2015 1/29/2014 11/14/2012   Prostatic Specific Ag      ng/mL  0.98 1.26   PSA, Ultrasensitive      0.00 - 4.00 ng/mL 1.00       Lab Results   Component Value Date    TSH 1.820 05/24/2016    T4FREE 0.81 05/24/2016     Lab Results   Component Value Date    WBC 6.4 06/07/2017    HGB 14.4 06/07/2017    HCT 44.7 06/07/2017    MCV 90.2 06/07/2017     06/07/2017         Immunization History   Administered Date(s) Administered    Tdap (Boostrix, Adacel) 07/25/2013    Zoster Live (Zostavax) 01/25/2013       Health Maintenance   Topic Date Due    Shingles Vaccine (2 of 3) 03/22/2013    Flu vaccine (Season Ended) 01/23/2020 (Originally 9/1/2019)    A1C test (Diabetic or Prediabetic)  04/03/2020    Colon cancer screen colonoscopy  04/08/2021    DTaP/Tdap/Td vaccine (2 - Td) 07/25/2023    Lipid screen  04/03/2024    Hepatitis C screen  Discontinued    HIV screen  Discontinued         AAA ultrasound (Male, 65-75, smoked ever) indicated at this time? NO  CT Lung Screen (55-80, 30 pk-yrs, smoking or quit <15 years) indicated at this time? NO tobacco history.        Future Appointments   Date Time Provider Jenelle Mcgoverni   2/24/2020  9:45 AM [address]         ASSESSMENT       Diagnosis Orders   1. Well adult exam     2. Hyperlipidemia, unspecified hyperlipidemia type  atorvastatin (LIPITOR) 10 MG tablet   3. Adjustment disorder with depressed mood     4. Benign non-nodular prostatic hyperplasia with lower urinary tract symptoms  tamsulosin (FLOMAX) 0.4 MG capsule    tadalafil (CIALIS) 5 MG tablet   5. Gastroesophageal reflux disease, esophagitis presence not specified     6. Obstructive sleep apnea on CPAP     7. Chronic allergic rhinitis     8. Erectile dysfunction, unspecified erectile dysfunction type  tadalafil (CIALIS) 5 MG tablet   9. IFG (impaired fasting glucose)         PLAN      Encouraged annual FLU SHOT- pt declines. (updated 4/8/2019)   [facility] when [facility]- on waiting list currently. COLONOSCOPY done 4/8/2016 per Dr. Yaniv Lozano- to do again in 2026. (updated 4/8/2019)  Ice in PM, Heat in AM- 20 minutes on, 1 hour off. OK for Aleve- 1-2 pills 2x/day with food as needed for thumb pain. Will consider Ortho referral in future if symptoms persist.   Encouraged increased water intake, > 80 ounces daily. Pt declines HIV and HEP C screening at this time due to lack of risk factors.    Refills   Continue current medicines   Follow up in 6 months     Electronically signed Ngoc Hernandez MD on 4/8/2019 at 10:03 AM

## 2019-04-08 NOTE — PATIENT INSTRUCTIONS
Encouraged annual FLU SHOT- pt declines. (updated 4/8/2019)   Encouraged NEW SHINGLES SHOT [facility]) - pt to get through this office when available- on waiting list currently. COLONOSCOPY done 4/8/2016 per Dr. Rosalva Puente- to do again in 2026. (updated 4/8/2019)  Ice in PM, Heat in AM- 20 minutes on, 1 hour off. OK for Aleve- 1-2 pills 2x/day with food as needed for thumb pain. Will consider Ortho referral in future if symptoms persist.   Encouraged increased water intake, > 80 ounces daily. Pt declines HIV and HEP C screening at this time due to lack of risk factors.    Refills   Continue current medicines   Follow up in 6 months

## 2019-07-09 ENCOUNTER — NURSE ONLY (OUTPATIENT)
Dept: FAMILY MEDICINE CLINIC | Age: 64
End: 2019-07-09
Payer: COMMERCIAL

## 2019-07-09 DIAGNOSIS — Z23 NEED FOR SHINGLES VACCINE: Primary | ICD-10-CM

## 2019-07-09 PROCEDURE — 90750 HZV VACC RECOMBINANT IM: CPT | Performed by: FAMILY MEDICINE

## 2019-07-09 PROCEDURE — 90471 IMMUNIZATION ADMIN: CPT | Performed by: FAMILY MEDICINE

## 2019-10-14 ENCOUNTER — OFFICE VISIT (OUTPATIENT)
Dept: FAMILY MEDICINE CLINIC | Age: 64
End: 2019-10-14
Payer: COMMERCIAL

## 2019-10-14 VITALS
SYSTOLIC BLOOD PRESSURE: 124 MMHG | DIASTOLIC BLOOD PRESSURE: 72 MMHG | WEIGHT: 190 LBS | HEART RATE: 76 BPM | BODY MASS INDEX: 29.54 KG/M2 | TEMPERATURE: 98.2 F | RESPIRATION RATE: 16 BRPM

## 2019-10-14 DIAGNOSIS — Z12.5 SCREENING PSA (PROSTATE SPECIFIC ANTIGEN): ICD-10-CM

## 2019-10-14 DIAGNOSIS — K21.9 GASTROESOPHAGEAL REFLUX DISEASE, ESOPHAGITIS PRESENCE NOT SPECIFIED: ICD-10-CM

## 2019-10-14 DIAGNOSIS — G47.09 OTHER INSOMNIA: ICD-10-CM

## 2019-10-14 DIAGNOSIS — Z23 NEED FOR SHINGLES VACCINE: Primary | ICD-10-CM

## 2019-10-14 DIAGNOSIS — Z99.89 OBSTRUCTIVE SLEEP APNEA ON CPAP: ICD-10-CM

## 2019-10-14 DIAGNOSIS — N40.1 BENIGN NON-NODULAR PROSTATIC HYPERPLASIA WITH LOWER URINARY TRACT SYMPTOMS: ICD-10-CM

## 2019-10-14 DIAGNOSIS — E78.5 HYPERLIPIDEMIA, UNSPECIFIED HYPERLIPIDEMIA TYPE: ICD-10-CM

## 2019-10-14 DIAGNOSIS — F43.21 ADJUSTMENT DISORDER WITH DEPRESSED MOOD: ICD-10-CM

## 2019-10-14 DIAGNOSIS — G47.33 OBSTRUCTIVE SLEEP APNEA ON CPAP: ICD-10-CM

## 2019-10-14 PROCEDURE — 99214 OFFICE O/P EST MOD 30 MIN: CPT | Performed by: FAMILY MEDICINE

## 2019-10-14 PROCEDURE — 90750 HZV VACC RECOMBINANT IM: CPT | Performed by: FAMILY MEDICINE

## 2019-10-14 PROCEDURE — 90471 IMMUNIZATION ADMIN: CPT | Performed by: FAMILY MEDICINE

## 2019-10-14 RX ORDER — ESZOPICLONE 1 MG/1
1 TABLET, FILM COATED ORAL NIGHTLY
Qty: 30 TABLET | Refills: 0 | Status: SHIPPED | OUTPATIENT
Start: 2019-10-14 | End: 2019-11-13

## 2019-10-14 RX ORDER — ESZOPICLONE 1 MG/1
1 TABLET, FILM COATED ORAL NIGHTLY PRN
COMMUNITY
End: 2019-10-14 | Stop reason: SDUPTHER

## 2019-10-14 ASSESSMENT — ENCOUNTER SYMPTOMS
NAUSEA: 0
CHEST TIGHTNESS: 0
BLOOD IN STOOL: 0
DIARRHEA: 0
ABDOMINAL PAIN: 0
ANAL BLEEDING: 0
VOMITING: 0
CONSTIPATION: 0
SHORTNESS OF BREATH: 0

## 2019-12-12 ENCOUNTER — APPOINTMENT (OUTPATIENT)
Dept: GENERAL RADIOLOGY | Age: 64
End: 2019-12-12
Payer: COMMERCIAL

## 2019-12-12 ENCOUNTER — HOSPITAL ENCOUNTER (EMERGENCY)
Age: 64
Discharge: HOME | End: 2019-12-12
Payer: COMMERCIAL

## 2019-12-12 VITALS
OXYGEN SATURATION: 100 % | DIASTOLIC BLOOD PRESSURE: 67 MMHG | RESPIRATION RATE: 18 BRPM | BODY MASS INDEX: 26.66 KG/M2 | SYSTOLIC BLOOD PRESSURE: 129 MMHG | HEIGHT: 69 IN | TEMPERATURE: 98.4 F | WEIGHT: 180 LBS | HEART RATE: 76 BPM

## 2019-12-12 DIAGNOSIS — K21.9 GASTROESOPHAGEAL REFLUX DISEASE, ESOPHAGITIS PRESENCE NOT SPECIFIED: Primary | ICD-10-CM

## 2019-12-12 DIAGNOSIS — R07.9 CHEST PAIN WITH LOW RISK OF ACUTE CORONARY SYNDROME: ICD-10-CM

## 2019-12-12 LAB
ALBUMIN SERPL-MCNC: 4.1 G/DL (ref 3.5–5.1)
ALP BLD-CCNC: 82 U/L (ref 38–126)
ALT SERPL-CCNC: 16 U/L (ref 11–66)
ANION GAP SERPL CALCULATED.3IONS-SCNC: 12 MEQ/L (ref 8–16)
AST SERPL-CCNC: 14 U/L (ref 5–40)
BASOPHILS # BLD: 0.6 %
BASOPHILS ABSOLUTE: 0 THOU/MM3 (ref 0–0.1)
BILIRUB SERPL-MCNC: 0.5 MG/DL (ref 0.3–1.2)
BILIRUBIN DIRECT: < 0.2 MG/DL (ref 0–0.3)
BUN BLDV-MCNC: 19 MG/DL (ref 7–22)
CALCIUM SERPL-MCNC: 9 MG/DL (ref 8.5–10.5)
CHLORIDE BLD-SCNC: 103 MEQ/L (ref 98–111)
CO2: 26 MEQ/L (ref 23–33)
CREAT SERPL-MCNC: 1 MG/DL (ref 0.4–1.2)
EKG ATRIAL RATE: 62 BPM
EKG ATRIAL RATE: 67 BPM
EKG P AXIS: 58 DEGREES
EKG P AXIS: 66 DEGREES
EKG P-R INTERVAL: 172 MS
EKG P-R INTERVAL: 172 MS
EKG Q-T INTERVAL: 414 MS
EKG Q-T INTERVAL: 442 MS
EKG QRS DURATION: 90 MS
EKG QRS DURATION: 94 MS
EKG QTC CALCULATION (BAZETT): 437 MS
EKG QTC CALCULATION (BAZETT): 448 MS
EKG R AXIS: -19 DEGREES
EKG R AXIS: -25 DEGREES
EKG T AXIS: 52 DEGREES
EKG T AXIS: 61 DEGREES
EKG VENTRICULAR RATE: 62 BPM
EKG VENTRICULAR RATE: 67 BPM
EOSINOPHIL # BLD: 1.8 %
EOSINOPHILS ABSOLUTE: 0.1 THOU/MM3 (ref 0–0.4)
ERYTHROCYTE [DISTWIDTH] IN BLOOD BY AUTOMATED COUNT: 12.3 % (ref 11.5–14.5)
ERYTHROCYTE [DISTWIDTH] IN BLOOD BY AUTOMATED COUNT: 42.9 FL (ref 35–45)
GFR SERPL CREATININE-BSD FRML MDRD: 75 ML/MIN/1.73M2
GLUCOSE BLD-MCNC: 105 MG/DL (ref 70–108)
HCT VFR BLD CALC: 45.1 % (ref 42–52)
HEMOGLOBIN: 14.7 GM/DL (ref 14–18)
IMMATURE GRANS (ABS): 0.03 THOU/MM3 (ref 0–0.07)
IMMATURE GRANULOCYTES: 0.4 %
LIPASE: 28.1 U/L (ref 5.6–51.3)
LYMPHOCYTES # BLD: 22.5 %
LYMPHOCYTES ABSOLUTE: 1.5 THOU/MM3 (ref 1–4.8)
MCH RBC QN AUTO: 31 PG (ref 26–33)
MCHC RBC AUTO-ENTMCNC: 32.6 GM/DL (ref 32.2–35.5)
MCV RBC AUTO: 95.1 FL (ref 80–94)
MONOCYTES # BLD: 8.3 %
MONOCYTES ABSOLUTE: 0.6 THOU/MM3 (ref 0.4–1.3)
NUCLEATED RED BLOOD CELLS: 0 /100 WBC
OSMOLALITY CALCULATION: 283.9 MOSMOL/KG (ref 275–300)
PLATELET # BLD: 185 THOU/MM3 (ref 130–400)
PMV BLD AUTO: 10.5 FL (ref 9.4–12.4)
POTASSIUM SERPL-SCNC: 3.9 MEQ/L (ref 3.5–5.2)
PRO-BNP: 7.9 PG/ML (ref 0–900)
RBC # BLD: 4.74 MILL/MM3 (ref 4.7–6.1)
SEG NEUTROPHILS: 66.4 %
SEGMENTED NEUTROPHILS ABSOLUTE COUNT: 4.5 THOU/MM3 (ref 1.8–7.7)
SODIUM BLD-SCNC: 141 MEQ/L (ref 135–145)
TOTAL PROTEIN: 6.2 G/DL (ref 6.1–8)
TROPONIN T: < 0.01 NG/ML
TROPONIN T: < 0.01 NG/ML
WBC # BLD: 6.8 THOU/MM3 (ref 4.8–10.8)

## 2019-12-12 PROCEDURE — 83880 ASSAY OF NATRIURETIC PEPTIDE: CPT

## 2019-12-12 PROCEDURE — 93005 ELECTROCARDIOGRAM TRACING: CPT | Performed by: PHYSICIAN ASSISTANT

## 2019-12-12 PROCEDURE — 93010 ELECTROCARDIOGRAM REPORT: CPT | Performed by: NUCLEAR MEDICINE

## 2019-12-12 PROCEDURE — 83690 ASSAY OF LIPASE: CPT

## 2019-12-12 PROCEDURE — 6370000000 HC RX 637 (ALT 250 FOR IP): Performed by: PHYSICIAN ASSISTANT

## 2019-12-12 PROCEDURE — 36415 COLL VENOUS BLD VENIPUNCTURE: CPT

## 2019-12-12 PROCEDURE — 85025 COMPLETE CBC W/AUTO DIFF WBC: CPT

## 2019-12-12 PROCEDURE — 82248 BILIRUBIN DIRECT: CPT

## 2019-12-12 PROCEDURE — 84484 ASSAY OF TROPONIN QUANT: CPT

## 2019-12-12 PROCEDURE — 80053 COMPREHEN METABOLIC PANEL: CPT

## 2019-12-12 PROCEDURE — 99285 EMERGENCY DEPT VISIT HI MDM: CPT

## 2019-12-12 PROCEDURE — 71046 X-RAY EXAM CHEST 2 VIEWS: CPT

## 2019-12-12 RX ORDER — SUCRALFATE 1 G/1
1 TABLET ORAL ONCE
Status: COMPLETED | OUTPATIENT
Start: 2019-12-12 | End: 2019-12-12

## 2019-12-12 RX ORDER — FAMOTIDINE 20 MG/1
20 TABLET, FILM COATED ORAL ONCE
Status: COMPLETED | OUTPATIENT
Start: 2019-12-12 | End: 2019-12-12

## 2019-12-12 RX ADMIN — FAMOTIDINE 20 MG: 20 TABLET, FILM COATED ORAL at 14:48

## 2019-12-12 RX ADMIN — SUCRALFATE 1 G: 1 TABLET ORAL at 14:48

## 2019-12-12 RX ADMIN — LIDOCAINE HYDROCHLORIDE: 20 SOLUTION ORAL; TOPICAL at 14:48

## 2019-12-12 ASSESSMENT — ENCOUNTER SYMPTOMS
BACK PAIN: 0
CONSTIPATION: 0
DIARRHEA: 0
WHEEZING: 0
ABDOMINAL PAIN: 1
COUGH: 0
NAUSEA: 0
SHORTNESS OF BREATH: 0
VOMITING: 0
COLOR CHANGE: 0

## 2019-12-12 ASSESSMENT — PAIN SCALES - GENERAL
PAINLEVEL_OUTOF10: 2
PAINLEVEL_OUTOF10: 2

## 2019-12-12 ASSESSMENT — PAIN DESCRIPTION - PAIN TYPE: TYPE: ACUTE PAIN

## 2019-12-12 ASSESSMENT — PAIN DESCRIPTION - FREQUENCY: FREQUENCY: CONTINUOUS

## 2019-12-12 ASSESSMENT — PAIN DESCRIPTION - DESCRIPTORS: DESCRIPTORS: ACHING

## 2019-12-12 ASSESSMENT — HEART SCORE: ECG: 0

## 2019-12-12 ASSESSMENT — PAIN DESCRIPTION - LOCATION: LOCATION: ABDOMEN;CHEST

## 2019-12-13 ENCOUNTER — TELEPHONE (OUTPATIENT)
Dept: CARDIOLOGY CLINIC | Age: 64
End: 2019-12-13

## 2019-12-13 ENCOUNTER — OFFICE VISIT (OUTPATIENT)
Dept: CARDIOLOGY CLINIC | Age: 64
End: 2019-12-13
Payer: COMMERCIAL

## 2019-12-13 VITALS
BODY MASS INDEX: 26.66 KG/M2 | HEART RATE: 80 BPM | DIASTOLIC BLOOD PRESSURE: 86 MMHG | SYSTOLIC BLOOD PRESSURE: 130 MMHG | HEIGHT: 69 IN | WEIGHT: 180 LBS

## 2019-12-13 DIAGNOSIS — R07.2 PRECORDIAL PAIN: Primary | ICD-10-CM

## 2019-12-13 DIAGNOSIS — I10 ESSENTIAL HYPERTENSION: ICD-10-CM

## 2019-12-13 DIAGNOSIS — E78.01 FAMILIAL HYPERCHOLESTEROLEMIA: ICD-10-CM

## 2019-12-13 PROCEDURE — 1036F TOBACCO NON-USER: CPT | Performed by: NUCLEAR MEDICINE

## 2019-12-13 PROCEDURE — 99213 OFFICE O/P EST LOW 20 MIN: CPT | Performed by: NUCLEAR MEDICINE

## 2019-12-13 PROCEDURE — G8484 FLU IMMUNIZE NO ADMIN: HCPCS | Performed by: NUCLEAR MEDICINE

## 2019-12-13 PROCEDURE — G8427 DOCREV CUR MEDS BY ELIG CLIN: HCPCS | Performed by: NUCLEAR MEDICINE

## 2019-12-13 PROCEDURE — 1123F ACP DISCUSS/DSCN MKR DOCD: CPT | Performed by: NUCLEAR MEDICINE

## 2019-12-13 PROCEDURE — 4040F PNEUMOC VAC/ADMIN/RCVD: CPT | Performed by: NUCLEAR MEDICINE

## 2019-12-13 PROCEDURE — 3017F COLORECTAL CA SCREEN DOC REV: CPT | Performed by: NUCLEAR MEDICINE

## 2019-12-13 PROCEDURE — G8417 CALC BMI ABV UP PARAM F/U: HCPCS | Performed by: NUCLEAR MEDICINE

## 2019-12-13 ASSESSMENT — ENCOUNTER SYMPTOMS
SHORTNESS OF BREATH: 1
ABDOMINAL DISTENTION: 0
ANAL BLEEDING: 0
RECTAL PAIN: 0
BACK PAIN: 0
ABDOMINAL PAIN: 0
CONSTIPATION: 0
BLOOD IN STOOL: 0
NAUSEA: 0
VOMITING: 0
CHEST TIGHTNESS: 1
DIARRHEA: 0

## 2019-12-16 ENCOUNTER — PATIENT MESSAGE (OUTPATIENT)
Dept: FAMILY MEDICINE CLINIC | Age: 64
End: 2019-12-16

## 2019-12-16 ENCOUNTER — TELEPHONE (OUTPATIENT)
Dept: FAMILY MEDICINE CLINIC | Age: 64
End: 2019-12-16

## 2019-12-16 DIAGNOSIS — G47.09 OTHER INSOMNIA: ICD-10-CM

## 2019-12-16 DIAGNOSIS — F43.21 ADJUSTMENT DISORDER WITH DEPRESSED MOOD: ICD-10-CM

## 2019-12-18 RX ORDER — TRAZODONE HYDROCHLORIDE 50 MG/1
TABLET ORAL
Qty: 30 TABLET | Refills: 5 | Status: SHIPPED | OUTPATIENT
Start: 2019-12-18 | End: 2020-06-26

## 2019-12-31 ENCOUNTER — HOSPITAL ENCOUNTER (OUTPATIENT)
Dept: NON INVASIVE DIAGNOSTICS | Age: 64
Discharge: HOME | End: 2019-12-31
Payer: COMMERCIAL

## 2019-12-31 VITALS — HEIGHT: 69 IN | BODY MASS INDEX: 26.66 KG/M2 | WEIGHT: 180 LBS

## 2019-12-31 LAB
LV EF: 55 %
LV EF: 56 %
LVEF MODALITY: NORMAL
LVEF MODALITY: NORMAL

## 2019-12-31 PROCEDURE — 78452 HT MUSCLE IMAGE SPECT MULT: CPT

## 2019-12-31 PROCEDURE — 93017 CV STRESS TEST TRACING ONLY: CPT | Performed by: NUCLEAR MEDICINE

## 2019-12-31 PROCEDURE — 93306 TTE W/DOPPLER COMPLETE: CPT

## 2019-12-31 PROCEDURE — 3430000000 HC RX DIAGNOSTIC RADIOPHARMACEUTICAL: Performed by: NUCLEAR MEDICINE

## 2019-12-31 PROCEDURE — A9500 TC99M SESTAMIBI: HCPCS | Performed by: NUCLEAR MEDICINE

## 2019-12-31 RX ADMIN — Medication 11 MILLICURIE: at 11:35

## 2019-12-31 RX ADMIN — Medication 34.1 MILLICURIE: at 12:50

## 2020-02-17 RX ORDER — BUPROPION HYDROCHLORIDE 150 MG/1
TABLET ORAL
Qty: 90 TABLET | Refills: 3 | Status: SHIPPED | OUTPATIENT
Start: 2020-02-17 | End: 2021-03-05

## 2020-02-17 NOTE — TELEPHONE ENCOUNTER
Request sent from Phani Dunne for refill on wellbutrin xl 150 mg qd. Last seen 10/14/19, next appt 4/21/20. Medication verified. Order pended and set to escribe.

## 2020-04-21 RX ORDER — TAMSULOSIN HYDROCHLORIDE 0.4 MG/1
0.8 CAPSULE ORAL DAILY
Qty: 180 CAPSULE | Refills: 3 | Status: SHIPPED | OUTPATIENT
Start: 2020-04-21 | End: 2021-05-12

## 2020-06-27 RX ORDER — TRAZODONE HYDROCHLORIDE 50 MG/1
TABLET ORAL
Qty: 30 TABLET | Refills: 5 | Status: SHIPPED | OUTPATIENT
Start: 2020-06-27 | End: 2021-06-17

## 2020-06-29 ENCOUNTER — OFFICE VISIT (OUTPATIENT)
Dept: PULMONOLOGY | Age: 65
End: 2020-06-29
Payer: MEDICARE

## 2020-06-29 VITALS
BODY MASS INDEX: 26.36 KG/M2 | SYSTOLIC BLOOD PRESSURE: 126 MMHG | HEIGHT: 69 IN | TEMPERATURE: 97.9 F | WEIGHT: 178 LBS | DIASTOLIC BLOOD PRESSURE: 72 MMHG | OXYGEN SATURATION: 96 % | HEART RATE: 71 BPM

## 2020-06-29 PROCEDURE — 99213 OFFICE O/P EST LOW 20 MIN: CPT | Performed by: PHYSICIAN ASSISTANT

## 2020-06-29 PROCEDURE — 1036F TOBACCO NON-USER: CPT | Performed by: PHYSICIAN ASSISTANT

## 2020-06-29 PROCEDURE — 4040F PNEUMOC VAC/ADMIN/RCVD: CPT | Performed by: PHYSICIAN ASSISTANT

## 2020-06-29 PROCEDURE — G8417 CALC BMI ABV UP PARAM F/U: HCPCS | Performed by: PHYSICIAN ASSISTANT

## 2020-06-29 PROCEDURE — 3017F COLORECTAL CA SCREEN DOC REV: CPT | Performed by: PHYSICIAN ASSISTANT

## 2020-06-29 PROCEDURE — 1123F ACP DISCUSS/DSCN MKR DOCD: CPT | Performed by: PHYSICIAN ASSISTANT

## 2020-06-29 PROCEDURE — G8427 DOCREV CUR MEDS BY ELIG CLIN: HCPCS | Performed by: PHYSICIAN ASSISTANT

## 2020-06-29 RX ORDER — LANOLIN ALCOHOL/MO/W.PET/CERES
3 CREAM (GRAM) TOPICAL DAILY
COMMUNITY
End: 2021-01-29

## 2020-06-29 RX ORDER — DEXLANSOPRAZOLE 60 MG/1
60 CAPSULE, DELAYED RELEASE ORAL DAILY
COMMUNITY
End: 2020-08-11 | Stop reason: SDUPTHER

## 2020-06-29 RX ORDER — ESOMEPRAZOLE MAGNESIUM 40 MG/1
40 FOR SUSPENSION ORAL DAILY
COMMUNITY
End: 2021-04-07 | Stop reason: ALTCHOICE

## 2020-06-29 ASSESSMENT — ENCOUNTER SYMPTOMS
EYES NEGATIVE: 1
STRIDOR: 0
BACK PAIN: 0
ALLERGIC/IMMUNOLOGIC NEGATIVE: 1
WHEEZING: 0
DIARRHEA: 0
NAUSEA: 0
SHORTNESS OF BREATH: 0
CHEST TIGHTNESS: 0
COUGH: 0

## 2020-06-29 NOTE — PROGRESS NOTES
 Dementia Father     Birth Defects Sister     Diabetes Paternal Grandfather     High Blood Pressure Brother     Other Brother         heart palpatations        Review of Systems -   Review of Systems   Constitutional: Negative for activity change, appetite change, chills and fever. HENT: Negative for congestion and postnasal drip. Eyes: Negative. Respiratory: Negative for cough, chest tightness, shortness of breath, wheezing and stridor. Cardiovascular: Negative for chest pain and leg swelling. Gastrointestinal: Negative for diarrhea and nausea. GERD   Endocrine: Negative. Genitourinary: Negative. Musculoskeletal: Negative. Negative for arthralgias and back pain. Skin: Negative. Allergic/Immunologic: Negative. Neurological: Negative. Negative for dizziness and light-headedness. Psychiatric/Behavioral: Negative. All other systems reviewed and are negative. Physical Exam:    BMI:  Body mass index is 26.29 kg/m². Wt Readings from Last 3 Encounters:   06/29/20 178 lb (80.7 kg)   12/31/19 180 lb (81.6 kg)   12/13/19 180 lb (81.6 kg)     Weight stable / unchanged  Vitals: /72 (Site: Left Upper Arm, Position: Sitting, Cuff Size: Medium Adult)   Pulse 71   Temp 97.9 °F (36.6 °C)   Ht 5' 9\" (1.753 m)   Wt 178 lb (80.7 kg)   SpO2 96% Comment: on room air at rest  BMI 26.29 kg/m²       Physical Exam  Constitutional:       Appearance: He is well-developed. HENT:      Head: Normocephalic and atraumatic. Right Ear: External ear normal.      Left Ear: External ear normal.   Eyes:      Conjunctiva/sclera: Conjunctivae normal.      Pupils: Pupils are equal, round, and reactive to light. Neck:      Musculoskeletal: Normal range of motion and neck supple. Cardiovascular:      Rate and Rhythm: Normal rate and regular rhythm. Heart sounds: Normal heart sounds.    Pulmonary:      Effort: Pulmonary effort is normal.      Breath sounds: Normal breath sounds. Musculoskeletal: Normal range of motion. Skin:     General: Skin is warm and dry. Neurological:      Mental Status: He is alert and oriented to person, place, and time. Psychiatric:         Behavior: Behavior normal.         Thought Content: Thought content normal.         Judgment: Judgment normal.           ASSESSMENT/DIAGNOSIS     Diagnosis Orders   1. SUNIL on CPAP     2. Other insomnia              Plan   Do you need any equipment today? Yes update supplies  - Will give samples of REM fresh to try for insomnia  - otherwise continue Lunesta as needed and trazadone  - He  was advised to continue current positive airway pressure therapy with above described pressure. - He  advised to keep good compliance with current recommended pressure to get optimal results and clinical improvement  - Recommend 7-9 hours of sleep with PAP  - He was advised to call [facility] regarding supplies if needed.   -He call my office for earlier appointment if needed for worsening of sleep symptoms.   - He was instructed on weight loss  - Rosetta Mesa was educated about my impression and plan. Patient verbalizesunderstanding.   We will see Kingsley Seen back in: 1 year with download    Information added by my medical assistant/LPN was reviewed today         Richa De Guzman PA-C, MPAS  6/29/2020

## 2020-08-04 LAB
ALBUMIN SERPL-MCNC: 4.5 G/DL (ref 3.2–5.3)
ALK PHOSPHATASE: 91 U/L (ref 39–130)
ALT SERPL-CCNC: 19 U/L (ref 0–40)
ANION GAP SERPL CALCULATED.3IONS-SCNC: 10 MMOL/L (ref 5–15)
AST SERPL-CCNC: 18 U/L (ref 0–41)
BILIRUB SERPL-MCNC: 1 MG/DL (ref 0.3–1.2)
BUN BLDV-MCNC: 13 MG/DL (ref 5–27)
CALCIUM SERPL-MCNC: 9.4 MG/DL (ref 8.5–10.5)
CHLORIDE BLD-SCNC: 103 MMOL/L (ref 98–109)
CHOLESTEROL/HDL RATIO: 2.9 (ref 1–5)
CHOLESTEROL: 113 MG/DL (ref 150–200)
CO2: 31 MMOL/L (ref 22–32)
CREAT SERPL-MCNC: 1.17 MG/DL (ref 0.6–1.3)
EGFR AFRICAN AMERICAN: >60 ML/MIN/1.73SQ.M
EGFR IF NONAFRICAN AMERICAN: >60 ML/MIN/1.73SQ.M
GLUCOSE: 106 MG/DL (ref 65–99)
HDLC SERPL-MCNC: 39 MG/DL
LDL CHOLESTEROL CALCULATED: 50 MG/DL
LDL/HDL RATIO: 1.3
POTASSIUM SERPL-SCNC: 4.1 MMOL/L (ref 3.5–5)
PSA, ULTRASENSITIVE: 2.96 NG/ML (ref 0–4)
SODIUM BLD-SCNC: 144 MMOL/L (ref 134–146)
TOTAL PROTEIN: 7 G/DL (ref 6–8)
TRIGL SERPL-MCNC: 122 MG/DL (ref 27–150)
VLDLC SERPL CALC-MCNC: 24 MG/DL (ref 0–30)

## 2020-08-09 NOTE — PROGRESS NOTES
[facility]  [facility]  Dept: [phone]  Dept Fax: [phone]    SOL Khan is a 71 y. o.male    Pt presents for follow up of Hyperlipidemia, Adjustment Disorder with Depressed mood, BPH, GERD, SUNIL. Pt feeling ok since last visit- interval history and any new issues noted below: The home BP readings have not been checked regularly. Wt Readings from Last 3 Encounters:   08/11/20 173 lb (78.5 kg)   06/29/20 178 lb (80.7 kg)   12/31/19 180 lb (81.6 kg)   Weight decreased  17# since last visit 10 months ago. Pt continues seeing Dr. Andrew Arias for significant GERD symptoms. Sleep-up & down  Interest- OK  Guilt- OK  Energy- fair  Concentration- OK, memory is not good as it was. 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      Pt continues seeing Dr. Andrew Arias for significant GERD symptoms- now on Domperidone 4x/day    Patient Active Problem List   Diagnosis    Hyperlipidemia    GERD (gastroesophageal reflux disease)    Chronic allergic rhinitis    Benign non-nodular prostatic hyperplasia with lower urinary tract symptoms    IFG (impaired fasting glucose)    Obstructive sleep apnea on CPAP    Insomnia    Erectile dysfunction    Adjustment disorder with depressed mood       Current Outpatient Medications   Medication Sig Dispense Refill    ciprofloxacin (CIPRO) 500 MG tablet Take 1 tablet by mouth 2 times daily 84 tablet 0    dexlansoprazole (DEXILANT) 60 MG CPDR delayed release capsule Take 1 capsule by mouth every evening      Plant Sterol Stanol-Pantethine (CHOLESTOFF COMPLETE) 300-100 MG CAPS       Ginger, Zingiber officinalis, (GINGER PO) Take 1 tablet by mouth daily      Ginger, Zingiber officinalis, (GINGER ROOT PO) Take 1 tablet by mouth daily      tadalafil (CIALIS) 5 MG tablet Take 1 tablet by mouth as needed for Erectile Dysfunction 30 tablet 0    eszopiclone (LUNESTA) 1 MG TABS Take 1 tablet by mouth nightly as needed (insomnia) for up to 30 days. 30 tablet 0    UNABLE TO FIND Domperidone- 10 mg 4x/day per Dr. Rachid Coppola for GERD.  esomeprazole Magnesium (NEXIUM) 40 MG PACK Take 40 mg by mouth daily      melatonin 3 MG TABS tablet Take 3 mg by mouth daily      traZODone (DESYREL) 50 MG tablet TAKE 1/2-1 TABLET BY MOUTH AT BEDTIME 30 tablet 5    tamsulosin (FLOMAX) 0.4 MG capsule Take 2 capsules by mouth daily 180 capsule 3    atorvastatin (LIPITOR) 10 MG tablet TAKE 1 TABLET BY MOUTH ONE TIME A DAY  90 tablet 3    buPROPion (WELLBUTRIN XL) 150 MG extended release tablet TAKE 1 TABLET BY MOUTH IN THE MORNING  90 tablet 3    loratadine (CLARITIN) 10 MG tablet Take 10 mg by mouth daily       aspirin 81 MG EC tablet Take 81 mg by mouth daily.  fish oil-omega-3 fatty acids 1000 MG capsule Take 1 g by mouth daily       Coenzyme Q10 (CO Q 10) 100 MG CAPS Take 200 mg by mouth daily.  therapeutic multivitamin-minerals (THERAGRAN-M) tablet Take 1 tablet by mouth daily. occasionally      fluticasone (FLONASE) 50 MCG/ACT nasal spray 2 sprays by Nasal route daily. (Patient taking differently: 2 sprays by Nasal route daily PRN) 3 Bottle 3     No current facility-administered medications for this visit. Review of Systems   Constitutional: Negative for chills, diaphoresis, fatigue, fever and unexpected weight change. Eyes: Negative for visual disturbance. Respiratory: Negative for chest tightness and shortness of breath. Cardiovascular: Negative for chest pain, palpitations and leg swelling. Gastrointestinal: Negative for abdominal pain, anal bleeding, blood in stool, constipation, diarrhea, nausea and vomiting. Genitourinary: Negative for dysuria and hematuria. Musculoskeletal: Negative for neck pain. Right arm pain- seeing Dr. Paulette Leiva- had MRI- in PT- to follow up PRN. Neurological: Negative for dizziness, light-headedness and headaches. OBJECTIVE     /80   Pulse 64   Temp 98.1 °F (36.7 °C) (Temporal)   Resp 16   Ht 5' 7\" (1.702 m)   Wt 173 lb (78.5 kg)   BMI 27.10 kg/m²   Body mass index is 27.1 kg/m². BP Readings from Last 3 Encounters:   08/11/20 116/80   06/29/20 126/72   12/13/19 130/86       Physical Exam  Vitals signs and nursing note reviewed. Exam conducted with a chaperone present. Constitutional:       General: He is not in acute distress. Appearance: Normal appearance. He is not ill-appearing, toxic-appearing or diaphoretic. HENT:      Head: Normocephalic and atraumatic. Right Ear: External ear normal.      Left Ear: External ear normal.      Nose: Nose normal. No rhinorrhea. Mouth/Throat:      Mouth: Mucous membranes are moist.      Pharynx: Oropharynx is clear. Eyes:      General: No scleral icterus. Right eye: No discharge. Left eye: No discharge. Extraocular Movements: Extraocular movements intact. Conjunctiva/sclera: Conjunctivae normal.      Pupils: Pupils are equal, round, and reactive to light. Neck:      Musculoskeletal: Normal range of motion. Cardiovascular:      Rate and Rhythm: Normal rate and regular rhythm. Heart sounds: Normal heart sounds. No murmur. No friction rub. No gallop. Pulmonary:      Effort: Pulmonary effort is normal. No respiratory distress. Breath sounds: Normal breath sounds. No stridor. No wheezing, rhonchi or rales. Chest:      Chest wall: No tenderness. Abdominal:      General: Abdomen is flat. Bowel sounds are normal. There is no distension. Palpations: Abdomen is soft. There is no mass. Tenderness: There is no abdominal tenderness. There is no guarding or rebound. Hernia: No hernia is present. Genitourinary:     Comments: ABBY deferred today as pt currently asymptomatic.    Pt denies dysuria, hematuria, frequency, urgency, slightly increased difficulty starting/ stopping stream, frequent nocturia    Will reconsider annually. ES    Musculoskeletal: Normal range of motion. General: No swelling, deformity or signs of injury. Skin:     General: Skin is warm and dry. Coloration: Skin is not jaundiced or pale. Findings: No bruising, erythema, lesion or rash. Neurological:      General: No focal deficit present. Mental Status: He is alert and oriented to person, place, and time. Mental status is at baseline. Motor: No weakness. Coordination: Coordination normal.      Gait: Gait normal.   Psychiatric:         Mood and Affect: Mood normal.         Behavior: Behavior normal.         Thought Content:  Thought content normal.         Judgment: Judgment normal.         Component      Latest Ref Rng & Units 8/3/2020   Glucose      65 - 99 mg/dL 106 (H)   BUN      5 - 27 mg/dL 13   Creatinine      0.60 - 1.30 mg/dL 1.17   eGFR African American      >59 ml/min/1.73sq.m >60   EGFR IF NonAfrican American      >59 ml/min/1.73sq.m >60   Calcium      8.5 - 10.5 mg/dL 9.4   Sodium      134 - 146 mmol/L 144   Potassium      3.5 - 5.0 mmol/L 4.1   Chloride      98 - 109 mmol/L 103   CO2      22 - 32 mmol/L 31   Anion Gap      5 - 15 mmol/L 10   Bilirubin      0.3 - 1.2 mg/dL 1.0   Alk Phosphatase      39 - 130 U/L 91   AST      0 - 41 U/L 18   ALT      0 - 40 U/L 19   Total Protein      6.0 - 8.0 g/dL 7.0   Albumin      3.2 - 5.3 g/dL 4.5   Cholesterol      150 - 200 mg/dL 113 (L)   Triglycerides      27 - 150 mg/dL 122   HDL Cholesterol      >39 mg/dL 39 (L)   LDL Calculated      <130 mg/dL 50   VLDL Cholesterol Calculated      0 - 30 mg/dL 24   LDl/HDL Ratio      <3.5 1.3   Chol/HDL Ratio      1.0 - 5.0 2.9   PSA, Ultrasensitive      0.00 - 4.00 ng/mL 2.96       Lab Results   Component Value Date    LABA1C 5.7 (H) 04/03/2019       Lab Results   Component Value Date    TSH 2.88 01/03/2020    T4FREE 0.81 05/24/2016     Lab Results Component Value Date    WBC 6.8 12/12/2019    HGB 14.7 12/12/2019    HCT 45.1 12/12/2019    MCV 95.1 (H) 12/12/2019     12/12/2019     Component      Latest Ref Rng & Units 8/3/2020 4/3/2019 4/2/2018 3/31/2017   Prostatic Specific Ag      ng/mL       PSA, Ultrasensitive      0.00 - 4.00 ng/mL 2.96 1.41 1.150 0.97     Component      Latest Ref Rng & Units 5/24/2016 1/27/2015 1/29/2014   Prostatic Specific Ag      ng/mL   0.98   PSA, Ultrasensitive      0.00 - 4.00 ng/mL 1.14 1.00      NM Cardiac Stress Test Nuclear Imaging- Treadmill     Summary     This Nuclear Medicine study was negative for ischemia .     inferior attenuation     normal EF          Recommendation     Medical management.          Signatures          ----------------------------------------------------------------     Electronically signed by Naomie Burger MD (Interpreting     Cardiologist) on 12/31/2019 at 15:51          Immunization History   Administered Date(s) Administered    Pneumococcal Polysaccharide ([id number]) 08/11/2020    Tdap (Boostrix, Adacel) 07/25/2013    Zoster Live (Zostavax) 01/25/2013    Zoster Recombinant (Shingrix) 07/09/2019, 10/14/2019       Health Maintenance   Topic Date Due    A1C test (Diabetic or Prediabetic)  04/03/2020    Annual Wellness Visit (AWV)  04/12/2020    Flu vaccine (1) 09/01/2020    Colon cancer screen colonoscopy  04/08/2021    Lipid screen  08/03/2021    DTaP/Tdap/Td vaccine (2 - Td) 07/25/2023    Shingles Vaccine  Completed    Pneumococcal 65+ years Vaccine  Completed    Hepatitis A vaccine  Aged Out    Hepatitis B vaccine  Aged Out    Hib vaccine  Aged Out    Meningococcal (ACWY) vaccine  Aged Out    Hepatitis C screen  Discontinued    HIV screen  Discontinued     AAA ultrasound (Male, 65-75, smoked ever) indicated at this time? NO tobacco history  CT Lung Screen (55-80, 30 pk-yrs, smoking or quit <15 years) indicated at this time? NO tobacco history.     Future Appointments   Date Time Provider Jenelle Medina   9/18/2020  9:15 AM Abiola Fabian MD [address]   6/30/2021  [address] [facility], [address] [place] [address]     ASSESSMENT       Diagnosis Orders   1. IFG (impaired fasting glucose)  Hemoglobin A1C   2. Hyperlipidemia, unspecified hyperlipidemia type     3. Obstructive sleep apnea on CPAP     4. Benign non-nodular prostatic hyperplasia with lower urinary tract symptoms  tadalafil (CIALIS) 5 MG tablet   5. Adjustment disorder with depressed mood     6. Gastroesophageal reflux disease, esophagitis presence not specified     7. Increased prostate specific antigen (PSA) velocity  ciprofloxacin (CIPRO) 500 MG tablet    PSA, Prostatic Specific Antigen   8. Erectile dysfunction, unspecified erectile dysfunction type  tadalafil (CIALIS) 5 MG tablet   9. Need for prophylactic vaccination against Streptococcus pneumoniae (pneumococcus)  Pneumococcal polysaccharide vaccine 23-valent greater than or equal to 1yo subcutaneous/IM   10. Other insomnia  eszopiclone (LUNESTA) 1 MG TABS       PLAN      Encouraged annual FLU SHOT- pt declines. (updated 8/11/2020)  PNEUMOVAX 23 today   COLONOSCOPY done 4/8/2016 per Dr. Rachid Coppola- to do again in 2026. (updated 8/11/2020)  Start Cipro 500 mg- 1 pill 2x/day for 6 weeks. #84/ no refills. Take Probiotic at noon daily while using antibiotic  Recheck PSA in 6 weeks with HGA1C   Continue current medicines   Refills, including Lunesta. Follow up in 6 weeks- Medicare AWV and follow up PSA.        Electronically signed by Abiola Fabian MD on 8/11/2020 at 12:09 PM

## 2020-08-11 ENCOUNTER — OFFICE VISIT (OUTPATIENT)
Dept: FAMILY MEDICINE CLINIC | Age: 65
End: 2020-08-11
Payer: MEDICARE

## 2020-08-11 VITALS
SYSTOLIC BLOOD PRESSURE: 116 MMHG | HEART RATE: 64 BPM | HEIGHT: 67 IN | BODY MASS INDEX: 27.15 KG/M2 | TEMPERATURE: 98.1 F | RESPIRATION RATE: 16 BRPM | WEIGHT: 173 LBS | DIASTOLIC BLOOD PRESSURE: 80 MMHG

## 2020-08-11 PROCEDURE — G0009 ADMIN PNEUMOCOCCAL VACCINE: HCPCS | Performed by: FAMILY MEDICINE

## 2020-08-11 PROCEDURE — G8417 CALC BMI ABV UP PARAM F/U: HCPCS | Performed by: FAMILY MEDICINE

## 2020-08-11 PROCEDURE — 99214 OFFICE O/P EST MOD 30 MIN: CPT | Performed by: FAMILY MEDICINE

## 2020-08-11 PROCEDURE — 90732 PPSV23 VACC 2 YRS+ SUBQ/IM: CPT | Performed by: FAMILY MEDICINE

## 2020-08-11 PROCEDURE — 3017F COLORECTAL CA SCREEN DOC REV: CPT | Performed by: FAMILY MEDICINE

## 2020-08-11 PROCEDURE — 1036F TOBACCO NON-USER: CPT | Performed by: FAMILY MEDICINE

## 2020-08-11 PROCEDURE — G8427 DOCREV CUR MEDS BY ELIG CLIN: HCPCS | Performed by: FAMILY MEDICINE

## 2020-08-11 PROCEDURE — 4040F PNEUMOC VAC/ADMIN/RCVD: CPT | Performed by: FAMILY MEDICINE

## 2020-08-11 PROCEDURE — 1123F ACP DISCUSS/DSCN MKR DOCD: CPT | Performed by: FAMILY MEDICINE

## 2020-08-11 RX ORDER — CIPROFLOXACIN 500 MG/1
500 TABLET, FILM COATED ORAL 2 TIMES DAILY
Qty: 84 TABLET | Refills: 0 | Status: SHIPPED | OUTPATIENT
Start: 2020-08-11 | End: 2020-09-22

## 2020-08-11 RX ORDER — ESZOPICLONE 1 MG/1
1 TABLET, FILM COATED ORAL NIGHTLY PRN
COMMUNITY
End: 2020-08-11 | Stop reason: SDUPTHER

## 2020-08-11 RX ORDER — ESZOPICLONE 1 MG/1
1 TABLET, FILM COATED ORAL NIGHTLY PRN
Qty: 30 TABLET | Refills: 0 | Status: SHIPPED | OUTPATIENT
Start: 2020-08-11 | End: 2021-06-17 | Stop reason: SDUPTHER

## 2020-08-11 RX ORDER — CHOLECALCIFEROL (VITAMIN D3) 25 MCG
TABLET,CHEWABLE ORAL DAILY
COMMUNITY
Start: 2009-06-01 | End: 2021-06-17

## 2020-08-11 RX ORDER — DEXLANSOPRAZOLE 60 MG/1
1 CAPSULE, DELAYED RELEASE ORAL EVERY EVENING
Status: ON HOLD | COMMUNITY
Start: 2020-05-18

## 2020-08-11 RX ORDER — TADALAFIL 5 MG/1
5 TABLET ORAL PRN
Qty: 30 TABLET | Refills: 0 | Status: SHIPPED | OUTPATIENT
Start: 2020-08-11 | End: 2021-06-17 | Stop reason: SDUPTHER

## 2020-08-11 ASSESSMENT — ENCOUNTER SYMPTOMS
BLOOD IN STOOL: 0
NAUSEA: 0
VOMITING: 0
CHEST TIGHTNESS: 0
CONSTIPATION: 0
DIARRHEA: 0
ANAL BLEEDING: 0
ABDOMINAL PAIN: 0
SHORTNESS OF BREATH: 0

## 2020-08-11 ASSESSMENT — PATIENT HEALTH QUESTIONNAIRE - PHQ9
SUM OF ALL RESPONSES TO PHQ QUESTIONS 1-9: 0
SUM OF ALL RESPONSES TO PHQ9 QUESTIONS 1 & 2: 0
2. FEELING DOWN, DEPRESSED OR HOPELESS: 0
1. LITTLE INTEREST OR PLEASURE IN DOING THINGS: 0
SUM OF ALL RESPONSES TO PHQ QUESTIONS 1-9: 0

## 2020-08-11 NOTE — PROGRESS NOTES
Immunizations Administered     Name Date Dose Route    Pneumococcal Polysaccharide ([id number]) 8/11/2020 0.5 mL Intramuscular    Site: Deltoid- Left    Lot: [id number]    NDC: 6858-3907-05

## 2020-08-11 NOTE — PROGRESS NOTES
After obtaining consent, and per orders of Dr. Galen Chance, injection of Pneumovax 23, 0.5 ml IM left deltoid given by Dr. Kana Gutierrez. Patient tolerated and left after injection.

## 2020-09-27 LAB
AVERAGE GLUCOSE: 114 MG/DL
HBA1C MFR BLD: 5.6 % (ref 4.4–6.4)
PSA, ULTRASENSITIVE: 2.35 NG/ML (ref 0–4)

## 2020-09-28 ENCOUNTER — TELEPHONE (OUTPATIENT)
Dept: FAMILY MEDICINE CLINIC | Age: 65
End: 2020-09-28

## 2020-09-28 NOTE — TELEPHONE ENCOUNTER
Pt notified  Lab order mailed      ----- Message from Conor Philip MD sent at 9/27/2020 10:39 AM EDT -----  Notify pt-   Results reviewed. HGA1C ok at 5.6  PSA improved at 2.35  Recheck PSA again 3 months to assure stability.  ES

## 2020-11-20 ENCOUNTER — OFFICE VISIT (OUTPATIENT)
Dept: BARIATRICS/WEIGHT MGMT | Age: 65
End: 2020-11-20
Payer: MEDICARE

## 2020-11-20 VITALS
DIASTOLIC BLOOD PRESSURE: 78 MMHG | HEIGHT: 67 IN | RESPIRATION RATE: 18 BRPM | WEIGHT: 173 LBS | SYSTOLIC BLOOD PRESSURE: 136 MMHG | BODY MASS INDEX: 27.15 KG/M2 | HEART RATE: 78 BPM | TEMPERATURE: 97.9 F

## 2020-11-20 PROCEDURE — 3017F COLORECTAL CA SCREEN DOC REV: CPT | Performed by: SURGERY

## 2020-11-20 PROCEDURE — G8484 FLU IMMUNIZE NO ADMIN: HCPCS | Performed by: SURGERY

## 2020-11-20 PROCEDURE — G8427 DOCREV CUR MEDS BY ELIG CLIN: HCPCS | Performed by: SURGERY

## 2020-11-20 PROCEDURE — 4040F PNEUMOC VAC/ADMIN/RCVD: CPT | Performed by: SURGERY

## 2020-11-20 PROCEDURE — 1036F TOBACCO NON-USER: CPT | Performed by: SURGERY

## 2020-11-20 PROCEDURE — 99204 OFFICE O/P NEW MOD 45 MIN: CPT | Performed by: SURGERY

## 2020-11-20 PROCEDURE — 1123F ACP DISCUSS/DSCN MKR DOCD: CPT | Performed by: SURGERY

## 2020-11-20 PROCEDURE — G8417 CALC BMI ABV UP PARAM F/U: HCPCS | Performed by: SURGERY

## 2020-11-20 RX ORDER — PHENOL 1.4 %
1 AEROSOL, SPRAY (ML) MUCOUS MEMBRANE DAILY
COMMUNITY
End: 2021-03-09

## 2020-11-20 RX ORDER — LANSOPRAZOLE 30 MG/1
30 TABLET, ORALLY DISINTEGRATING, DELAYED RELEASE ORAL DAILY
COMMUNITY
End: 2021-03-09

## 2020-11-21 ASSESSMENT — ENCOUNTER SYMPTOMS
VOMITING: 0
APNEA: 1
VOICE CHANGE: 0
CHEST TIGHTNESS: 1
ANAL BLEEDING: 0
BLOOD IN STOOL: 0
SORE THROAT: 0
ABDOMINAL DISTENTION: 0
EYE REDNESS: 0
TROUBLE SWALLOWING: 0
WHEEZING: 0
BACK PAIN: 0
RECTAL PAIN: 0
STRIDOR: 0
COLOR CHANGE: 0
ABDOMINAL PAIN: 0
SHORTNESS OF BREATH: 0
NAUSEA: 0
EYE PAIN: 0
ALLERGIC/IMMUNOLOGIC NEGATIVE: 1
DIARRHEA: 0
SINUS PRESSURE: 0
EYE ITCHING: 0
COUGH: 1
EYE DISCHARGE: 0
RHINORRHEA: 0
PHOTOPHOBIA: 0
FACIAL SWELLING: 0
CHOKING: 0
CONSTIPATION: 0

## 2020-11-21 NOTE — PROGRESS NOTES
Subjective:      Patient ID: Karena Castanon is a 71 y.o. male. Chief Complaint   Patient presents with    New Patient     New Patient GERD referral from Dr. Kasi Cardenas is a 50-year-old male who presents for evaluation of possible surgical intervention due to worsening progressive gastroesophageal reflux disease. He states he has had heartburn/reflux for years. He has tried Nexium, Prilosec, Prevacid and Protonix in the past.  Currently on Nexium and Dexilant. He feels this is working the best for him however he is still having breakthrough symptoms. Also concerned because of the cost of Dexilant. He feels he is already maximize lifestyle and dietary modifications with only limited success. He feels the heartburn is worse with lying down. Worse with bending over. Worse after meals. He has to sleep in a recliner. Denies any pain with swallowing or any sort of dysphagia. HRQL score 26. He has been found to have some esophagitis and gastritis but no Wiseman's esophagus or dysplasia. No obvious hiatal hernia seen on upper endoscopy. Some chest tightness with symptoms. Has been ruled out for any cardiac etiology. No generalized abdominal pain. No new shortness of breath. No hematochezia or melena. No new urinary complaints. Review of Systems   Constitutional: Negative for activity change, appetite change, chills, diaphoresis, fatigue, fever and unexpected weight change. HENT: Positive for congestion. Negative for dental problem, drooling, ear discharge, ear pain, facial swelling, hearing loss, mouth sores, nosebleeds, postnasal drip, rhinorrhea, sinus pressure, sneezing, sore throat, tinnitus, trouble swallowing and voice change. Eyes: Negative for photophobia, pain, discharge, redness, itching and visual disturbance. Respiratory: Positive for apnea, cough and chest tightness. Negative for choking, shortness of breath, wheezing and stridor.     Cardiovascular: Negative for chest pain, palpitations and leg swelling. Gastrointestinal: Negative for abdominal distention, abdominal pain, anal bleeding, blood in stool, constipation, diarrhea, nausea, rectal pain and vomiting. Endocrine: Positive for cold intolerance. Negative for heat intolerance, polydipsia, polyphagia and polyuria. Genitourinary: Negative for decreased urine volume, difficulty urinating, discharge, dysuria, enuresis, flank pain, frequency, genital sores, hematuria, penile pain, penile swelling, scrotal swelling, testicular pain and urgency. Musculoskeletal: Negative for arthralgias, back pain, gait problem, joint swelling, myalgias, neck pain and neck stiffness. Skin: Negative for color change, pallor, rash and wound. Allergic/Immunologic: Negative. Neurological: Positive for dizziness and light-headedness. Negative for tremors, seizures, syncope, facial asymmetry, speech difficulty, weakness, numbness and headaches. Hematological: Negative for adenopathy. Does not bruise/bleed easily. Psychiatric/Behavioral: Positive for agitation and sleep disturbance. Negative for behavioral problems, confusion, decreased concentration, dysphoric mood, hallucinations, self-injury and suicidal ideas. The patient is not nervous/anxious and is not hyperactive.         Past Medical History:   Diagnosis Date    Allergic     Allergic rhinitis     Arthritis     Back pain     Depression     GERD (gastroesophageal reflux disease)     Hyperlipidemia     Prostatism     Unspecified sleep apnea        Past Surgical History:   Procedure Laterality Date    CARDIAC CATHETERIZATION      COLONOSCOPY  2011    EYE SURGERY      OTHER SURGICAL HISTORY      tendonitus repair     RETINAL DETACHMENT SURGERY         Current Outpatient Medications   Medication Sig Dispense Refill    lansoprazole (PREVACID SOLUTAB) 30 MG disintegrating tablet Take 30 mg by mouth daily      Calcium Carbonate Antacid 1000 MG CHEW Take by mouth      calcium carbonate 600 MG TABS tablet Take 1 tablet by mouth daily      Probiotic Product (PROBIOTIC PO) Take by mouth      dexlansoprazole (DEXILANT) 60 MG CPDR delayed release capsule Take 1 capsule by mouth every evening      Ginger, Zingiber officinalis, (GINGER PO) Take 1 tablet by mouth daily      esomeprazole Magnesium (NEXIUM) 40 MG PACK Take 40 mg by mouth daily      tamsulosin (FLOMAX) 0.4 MG capsule Take 2 capsules by mouth daily 180 capsule 3    atorvastatin (LIPITOR) 10 MG tablet TAKE 1 TABLET BY MOUTH ONE TIME A DAY  90 tablet 3    buPROPion (WELLBUTRIN XL) 150 MG extended release tablet TAKE 1 TABLET BY MOUTH IN THE MORNING  90 tablet 3    aspirin 81 MG EC tablet Take 81 mg by mouth daily.  fish oil-omega-3 fatty acids 1000 MG capsule Take 1 g by mouth daily       Coenzyme Q10 (CO Q 10) 100 MG CAPS Take 200 mg by mouth daily.  therapeutic multivitamin-minerals (THERAGRAN-M) tablet Take 1 tablet by mouth daily. occasionally      Plant Sterol Stanol-Pantethine (CHOLESTOFF COMPLETE) 300-100 MG CAPS       tadalafil (CIALIS) 5 MG tablet Take 1 tablet by mouth as needed for Erectile Dysfunction (Patient not taking: Reported on 11/20/2020) 30 tablet 0    UNABLE TO FIND Domperidone- 10 mg 4x/day per Dr. Pretty Mcmullen for GERD.  melatonin 3 MG TABS tablet Take 3 mg by mouth daily      traZODone (DESYREL) 50 MG tablet TAKE 1/2-1 TABLET BY MOUTH AT BEDTIME (Patient not taking: Reported on 11/20/2020) 30 tablet 5    fluticasone (FLONASE) 50 MCG/ACT nasal spray 2 sprays by Nasal route daily. (Patient not taking: Reported on 11/20/2020) 3 Bottle 3    loratadine (CLARITIN) 10 MG tablet Take 10 mg by mouth daily        No current facility-administered medications for this visit. Allergies   Allergen Reactions    Seasonal      hayfever per pt.        Family History   Problem Relation Age of Onset    Arthritis Mother     Hearing Loss Mother     High Blood Pressure Mother     High Cholesterol Mother     Stroke Mother     Cancer Mother     Depression Father     Dementia Father     Arthritis Father     Diabetes Father     Birth Defects Sister     Diabetes Paternal Grandfather     High Blood Pressure Brother     Arthritis Brother     Other Brother         heart palpatations    Cancer Maternal Grandfather     Cancer Maternal Grandmother        Social History     Socioeconomic History    Marital status:      Spouse name: Not on file    Number of children: Not on file    Years of education: Not on file    Highest education level: Not on file   Occupational History    Not on file   Social Needs    Financial resource strain: Not on file    Food insecurity     Worry: Not on file     Inability: Not on file   [employer] needs     Medical: Not on file     Non-medical: Not on file   Tobacco Use    Smoking status: Never Smoker    Smokeless tobacco: Never Used   Substance and Sexual Activity    Alcohol use: Yes     Types: 1 Shots of liquor per week     Comment: rare    Drug use: No    Sexual activity: Yes     Partners: Female   Lifestyle    Physical activity     Days per week: Not on file     Minutes per session: Not on file    Stress: Not on file   Relationships    Social connections     Talks on phone: Not on file     Gets together: Not on file     Attends Sikhism service: Not on file     Active member of club or organization: Not on file     Attends meetings of clubs or organizations: Not on file     Relationship status: Not on file    Intimate partner violence     Fear of current or ex partner: Not on file     Emotionally abused: Not on file     Physically abused: Not on file     Forced sexual activity: Not on file   Other Topics Concern    Not on file   Social History Narrative    Not on file     Vitals:    11/20/20 1215   BP: 136/78   Site: Right Upper Arm   Position: Sitting   Cuff Size: Large Adult   Pulse: 78   Resp: 18   Temp: 97.9 °F (36.6 °C) [facility]fra[facility]   Weight: 173 lb (78.5 kg)   Height: 5' 7\" (1.702 m)     Body mass index is 27.1 kg/m². Objective:   Physical Exam  Vitals signs reviewed. Constitutional:       General: He is not in acute distress. Appearance: He is well-developed. He is not diaphoretic. HENT:      Head: Normocephalic and atraumatic. Right Ear: External ear normal.      Left Ear: External ear normal.      Nose: Nose normal.   Eyes:      General: No scleral icterus. Right eye: No discharge. Left eye: No discharge. Conjunctiva/sclera: Conjunctivae normal.   Neck:      Musculoskeletal: Normal range of motion and neck supple. Cardiovascular:      Rate and Rhythm: Normal rate and regular rhythm. Heart sounds: Normal heart sounds. Pulmonary:      Effort: Pulmonary effort is normal. No respiratory distress. Breath sounds: Normal breath sounds. No wheezing or rales. Chest:      Chest wall: No tenderness. Abdominal:      General: Bowel sounds are normal. There is no distension. Palpations: Abdomen is soft. There is no mass. Tenderness: There is no abdominal tenderness. There is no guarding or rebound. Musculoskeletal: Normal range of motion. General: No tenderness. Skin:     General: Skin is warm and dry. Coloration: Skin is not pale. Findings: No erythema or rash. Neurological:      Mental Status: He is alert and oriented to person, place, and time. Cranial Nerves: No cranial nerve deficit. Psychiatric:         Behavior: Behavior normal.         Thought Content:  Thought content normal.         Judgment: Judgment normal.         Lab Results   Component Value Date     08/03/2020    K 4.1 08/03/2020     08/03/2020    CO2 31 08/03/2020     Lab Results   Component Value Date    CREATININE 1.17 08/03/2020     Lab Results   Component Value Date    WBC 6.8 12/12/2019    HGB 14.7 12/12/2019    HCT 45.1 12/12/2019    MCV 95.1 (H) 12/12/2019  12/12/2019     Imaging -           Patient Active Problem List   Diagnosis    Hyperlipidemia    GERD (gastroesophageal reflux disease)    Chronic allergic rhinitis    Benign non-nodular prostatic hyperplasia with lower urinary tract symptoms    IFG (impaired fasting glucose)    Obstructive sleep apnea on CPAP    Insomnia    Erectile dysfunction    Adjustment disorder with depressed mood     Assessment:      1. Chronic progressive gastroesophageal reflux disease      Plan:      1. Esophagram/upper GI  2. Upper endoscopy with pH Bravo  3. Dietary and lifestyle modification/restrictions discussed with patient in regards to GERD/heartburn. 4.  Nutritional education occurred today. 5.  Continue antacid/PPI as directed. Currently on Nexium and Dexilant. 6.  Follow-up after imaging completed  7. Long discussion with patient about the pros and cons of magnetic sphincter augmentation (LINX) insertion and fundoplication in regards to improvement of GERD versus continued conservative treatment plan. 8.  Signs and symptoms reviewed with patient that would be concerning and need him to return to office for re-evaluation. Patient states He will call if He has questions or concerns.           Brianna Sams MD

## 2020-12-03 ENCOUNTER — TELEPHONE (OUTPATIENT)
Dept: BARIATRICS/WEIGHT MGMT | Age: 65
End: 2020-12-03

## 2020-12-07 ENCOUNTER — HOSPITAL ENCOUNTER (OUTPATIENT)
Dept: GENERAL RADIOLOGY | Age: 65
Discharge: HOME | End: 2020-12-07
Payer: MEDICARE

## 2020-12-07 PROCEDURE — 6360000004 HC RX CONTRAST MEDICATION: Performed by: SURGERY

## 2020-12-07 PROCEDURE — 2500000003 HC RX 250 WO HCPCS: Performed by: SURGERY

## 2020-12-07 PROCEDURE — 74220 X-RAY XM ESOPHAGUS 1CNTRST: CPT

## 2020-12-07 PROCEDURE — 6370000000 HC RX 637 (ALT 250 FOR IP): Performed by: SURGERY

## 2020-12-07 PROCEDURE — A4641 RADIOPHARM DX AGENT NOC: HCPCS | Performed by: SURGERY

## 2020-12-07 RX ADMIN — BARIUM SULFATE 70 ML: 0.6 SUSPENSION ORAL at 08:25

## 2020-12-07 RX ADMIN — ANTACID/ANTIFLATULENT 1 EACH: 380; 550; 10; 10 GRANULE, EFFERVESCENT ORAL at 08:25

## 2020-12-07 RX ADMIN — BARIUM SULFATE 140 ML: 980 POWDER, FOR SUSPENSION ORAL at 08:25

## 2020-12-14 ENCOUNTER — TELEPHONE (OUTPATIENT)
Dept: BARIATRICS/WEIGHT MGMT | Age: 65
End: 2020-12-14

## 2021-01-08 ENCOUNTER — HOSPITAL ENCOUNTER (OUTPATIENT)
Age: 66
Setting detail: SPECIMEN
Discharge: HOME | End: 2021-01-08
Payer: MEDICARE

## 2021-01-08 PROCEDURE — U0003 INFECTIOUS AGENT DETECTION BY NUCLEIC ACID (DNA OR RNA); SEVERE ACUTE RESPIRATORY SYNDROME CORONAVIRUS 2 (SARS-COV-2) (CORONAVIRUS DISEASE [COVID-19]), AMPLIFIED PROBE TECHNIQUE, MAKING USE OF HIGH THROUGHPUT TECHNOLOGIES AS DESCRIBED BY CMS-2020-01-R: HCPCS

## 2021-01-09 LAB — PSA, ULTRASENSITIVE: 2.36 NG/ML (ref 0–4)

## 2021-01-10 LAB — SARS-COV-2: NOT DETECTED

## 2021-01-12 ENCOUNTER — NURSE ONLY (OUTPATIENT)
Dept: LAB | Age: 66
End: 2021-01-12

## 2021-01-15 ENCOUNTER — ANESTHESIA EVENT (OUTPATIENT)
Dept: ENDOSCOPY | Age: 66
End: 2021-01-15
Payer: MEDICARE

## 2021-01-15 ENCOUNTER — ANESTHESIA (OUTPATIENT)
Dept: ENDOSCOPY | Age: 66
End: 2021-01-15
Payer: MEDICARE

## 2021-01-15 ENCOUNTER — HOSPITAL ENCOUNTER (OUTPATIENT)
Age: 66
Setting detail: OUTPATIENT SURGERY
Discharge: HOME | End: 2021-01-15
Attending: INTERNAL MEDICINE | Admitting: INTERNAL MEDICINE
Payer: MEDICARE

## 2021-01-15 VITALS
SYSTOLIC BLOOD PRESSURE: 107 MMHG | RESPIRATION RATE: 18 BRPM | HEART RATE: 65 BPM | OXYGEN SATURATION: 96 % | DIASTOLIC BLOOD PRESSURE: 64 MMHG | WEIGHT: 172.4 LBS | HEIGHT: 68 IN | BODY MASS INDEX: 26.13 KG/M2 | TEMPERATURE: 97.4 F

## 2021-01-15 VITALS
SYSTOLIC BLOOD PRESSURE: 101 MMHG | OXYGEN SATURATION: 97 % | DIASTOLIC BLOOD PRESSURE: 58 MMHG | RESPIRATION RATE: 13 BRPM

## 2021-01-15 PROCEDURE — 3700000001 HC ADD 15 MINUTES (ANESTHESIA): Performed by: INTERNAL MEDICINE

## 2021-01-15 PROCEDURE — 7100000010 HC PHASE II RECOVERY - FIRST 15 MIN: Performed by: INTERNAL MEDICINE

## 2021-01-15 PROCEDURE — 2709999900 HC NON-CHARGEABLE SUPPLY: Performed by: INTERNAL MEDICINE

## 2021-01-15 PROCEDURE — 6360000002 HC RX W HCPCS: Performed by: NURSE ANESTHETIST, CERTIFIED REGISTERED

## 2021-01-15 PROCEDURE — 2500000003 HC RX 250 WO HCPCS: Performed by: NURSE ANESTHETIST, CERTIFIED REGISTERED

## 2021-01-15 PROCEDURE — 2580000003 HC RX 258: Performed by: INTERNAL MEDICINE

## 2021-01-15 PROCEDURE — 3700000000 HC ANESTHESIA ATTENDED CARE: Performed by: INTERNAL MEDICINE

## 2021-01-15 PROCEDURE — 7100000011 HC PHASE II RECOVERY - ADDTL 15 MIN: Performed by: INTERNAL MEDICINE

## 2021-01-15 PROCEDURE — 3609015500 HC GASTRIC/DUODENAL MOTILITY &/OR MANOMETRY STUDY: Performed by: INTERNAL MEDICINE

## 2021-01-15 PROCEDURE — 3609012400 HC EGD TRANSORAL BIOPSY SINGLE/MULTIPLE: Performed by: INTERNAL MEDICINE

## 2021-01-15 PROCEDURE — 88305 TISSUE EXAM BY PATHOLOGIST: CPT

## 2021-01-15 RX ORDER — PROPOFOL 10 MG/ML
INJECTION, EMULSION INTRAVENOUS PRN
Status: DISCONTINUED | OUTPATIENT
Start: 2021-01-15 | End: 2021-01-15 | Stop reason: SDUPTHER

## 2021-01-15 RX ORDER — LIDOCAINE HYDROCHLORIDE 20 MG/ML
INJECTION, SOLUTION EPIDURAL; INFILTRATION; INTRACAUDAL; PERINEURAL PRN
Status: DISCONTINUED | OUTPATIENT
Start: 2021-01-15 | End: 2021-01-15 | Stop reason: SDUPTHER

## 2021-01-15 RX ORDER — SODIUM CHLORIDE 450 MG/100ML
INJECTION, SOLUTION INTRAVENOUS CONTINUOUS
Status: DISCONTINUED | OUTPATIENT
Start: 2021-01-15 | End: 2021-01-15 | Stop reason: HOSPADM

## 2021-01-15 RX ADMIN — PROPOFOL 50 MG: 10 INJECTION, EMULSION INTRAVENOUS at 10:58

## 2021-01-15 RX ADMIN — PROPOFOL 50 MG: 10 INJECTION, EMULSION INTRAVENOUS at 11:03

## 2021-01-15 RX ADMIN — SODIUM CHLORIDE: 4.5 INJECTION, SOLUTION INTRAVENOUS at 10:35

## 2021-01-15 RX ADMIN — LIDOCAINE HYDROCHLORIDE 100 MG: 20 INJECTION, SOLUTION EPIDURAL; INFILTRATION; INTRACAUDAL; PERINEURAL at 10:57

## 2021-01-15 RX ADMIN — PROPOFOL 50 MG: 10 INJECTION, EMULSION INTRAVENOUS at 10:57

## 2021-01-15 ASSESSMENT — PAIN SCALES - GENERAL
PAINLEVEL_OUTOF10: 0
PAINLEVEL_OUTOF10: 0

## 2021-01-15 NOTE — PROGRESS NOTES
EGD complete, photos taken,cold forcep biopsies taken placed in 3 jars . Bravo capsule placed , pt tolerated procedure well    Scope Number [id number] used.

## 2021-01-15 NOTE — H&P
[facility]   Pre-Operative History and Physical: EGD with Bravo Capsule Placement    Patient: Laila Solis  : 1955     History Obtained From:  patient, electronic medical record    HISTORY OF PRESENT ILLNESS:    The patient is a 76 y.o. male who presents for an EGD with Bravo Capsule Placement for GERD    Past Medical History:        Diagnosis Date    Allergic     Allergic rhinitis     Arthritis     Back pain     Depression     GERD (gastroesophageal reflux disease)     Hyperlipidemia     Prostatism     Unspecified sleep apnea      Past Surgical History:        Procedure Laterality Date    CARDIAC CATHETERIZATION      COLONOSCOPY      EYE SURGERY      OTHER SURGICAL HISTORY      tendonitus repair     RETINAL DETACHMENT SURGERY         Medications Prior to Admission:   No current facility-administered medications on file prior to encounter. Current Outpatient Medications on File Prior to Encounter   Medication Sig Dispense Refill    lansoprazole (PREVACID SOLUTAB) 30 MG disintegrating tablet Take 30 mg by mouth daily      Calcium Carbonate Antacid 1000 MG CHEW Take by mouth      calcium carbonate 600 MG TABS tablet Take 1 tablet by mouth daily      Probiotic Product (PROBIOTIC PO) Take by mouth      dexlansoprazole (DEXILANT) 60 MG CPDR delayed release capsule Take 1 capsule by mouth every evening      Plant Sterol Stanol-Pantethine (CHOLESTOFF COMPLETE) 300-100 MG CAPS       Ginger, Zingiber officinalis, (GINGER PO) Take 1 tablet by mouth daily      tadalafil (CIALIS) 5 MG tablet Take 1 tablet by mouth as needed for Erectile Dysfunction (Patient not taking: Reported on 2020) 30 tablet 0    UNABLE TO FIND Domperidone- 10 mg 4x/day per Dr. Romeo Ndiaye for GERD.       esomeprazole Magnesium (NEXIUM) 40 MG PACK Take 40 mg by mouth daily      melatonin 3 MG TABS tablet Take 3 mg by mouth daily      traZODone (DESYREL) 50 MG tablet TAKE 1/2-1 TABLET BY PLAN:    1. Patient is a 76 y.o. male here for an EGD with MAC    2. Procedure options, risks and benefits reviewed with patient. We specifically discussed that risks include but are not limited to infection, bleeding, perforation, death, and missed lesions. Patient expresses understanding.       Electronically signed by Jason Harvey MD on 1/15/2021 at 9:31 Salma Calderon MD  [facility]

## 2021-01-15 NOTE — ANESTHESIA POSTPROCEDURE EVALUATION
Department of Anesthesiology  Postprocedure Note    Patient: Doris Obregon  MRN: [id number]  YOB: 1955  Date of evaluation: 1/15/2021  Time:  11:10 AM     Procedure Summary     Date: 01/15/21 Room / Location: [address] [place] [address]    Anesthesia Start: 7210 Anesthesia Stop: 1110    Procedures:       BRAVO (N/A )      EGD BIOPSY Diagnosis: (GERD)    Surgeons: Lavonne Zavala MD Responsible Provider: Enrique Sales DO    Anesthesia Type: MAC ASA Status: 2          Anesthesia Type: MAC    Renata Phase I: Renata Score: 10    Renata Phase II:      Last vitals: Reviewed and per EMR flowsheets.        Anesthesia Post Evaluation    Patient location during evaluation: bedside  Patient participation: complete - patient participated  Level of consciousness: awake and alert  Airway patency: patent  Nausea & Vomiting: no nausea and no vomiting  Complications: no  Cardiovascular status: hemodynamically stable  Respiratory status: acceptable, room air and spontaneous ventilation  Hydration status: euvolemic

## 2021-01-15 NOTE — PROGRESS NOTES
Recovery mode, denies discomfort. Taking fluids. Dr. Aquiles Blackmon discussed findings with pt and . Discharge instructions provided and understanding verbalized.

## 2021-01-15 NOTE — ANESTHESIA PRE PROCEDURE
Department of Anesthesiology  Preprocedure Note       Name:  Cecelia Carpenter   Age:  76 y.o.  :  1955                                          MRN:  [id number]         Date:  1/15/2021      Surgeon: Mariela Giordano):  Jose Dyer MD    Procedure: Procedure(s):  BRAVO    Medications prior to admission:   Prior to Admission medications    Medication Sig Start Date End Date Taking? Authorizing Provider   Eszopiclone (LUNESTA PO) Take by mouth   Yes Historical Provider, MD   lansoprazole (PREVACID SOLUTAB) 30 MG disintegrating tablet Take 30 mg by mouth daily   Yes Historical Provider, MD   Calcium Carbonate Antacid 1000 MG CHEW Take by mouth   Yes Historical Provider, MD   calcium carbonate 600 MG TABS tablet Take 1 tablet by mouth daily   Yes Historical Provider, MD   Probiotic Product (PROBIOTIC PO) Take by mouth   Yes Historical Provider, MD   dexlansoprazole (DEXILANT) 60 MG CPDR delayed release capsule Take 1 capsule by mouth every evening 20  Yes Historical Provider, MD   Plant Sterol Stanol-Pantethine (CHOLESTOFF COMPLETE) 300-100 MG CAPS  09  Yes Historical Provider, MD   Gingjasmin, Zingiber officinalis, (GINGER PO) Take 1 tablet by mouth daily   Yes Historical Provider, MD   tadalafil (CIALIS) 5 MG tablet Take 1 tablet by mouth as needed for Erectile Dysfunction 20  Yes Savi Cline MD   UNABLE TO FIND Domperidone- 10 mg 4x/day per Dr. Pascual Gr for GERD.    Yes Historical Provider, MD   esomeprazole Magnesium (NEXIUM) 40 MG PACK Take 40 mg by mouth daily   Yes Historical Provider, MD   traZODone (DESYREL) 50 MG tablet TAKE 1/2-1 TABLET BY MOUTH AT BEDTIME 20  Yes Savi Cline MD   tamsulosin [facility]) 0.4 MG capsule Take 2 capsules by mouth daily 20  Yes Savi Cline MD   atorvastatin (LIPITOR) 10 MG tablet TAKE 1 TABLET BY MOUTH ONE TIME A DAY  3/24/20  Yes Araceli Patel, APRN - CNP buPROPion (WELLBUTRIN XL) 150 MG extended release tablet TAKE 1 TABLET BY MOUTH IN THE MORNING  2/17/20  Yes Ilia Velasquez MD   fluticasone (FLONASE) 50 MCG/ACT nasal spray 2 sprays by Nasal route daily. 1/30/15  Yes Ilia Velasquez MD   loratadine (CLARITIN) 10 MG tablet Take 10 mg by mouth daily    Yes Historical Provider, MD   aspirin 81 MG EC tablet Take 81 mg by mouth daily. Yes Historical Provider, MD   fish oil-omega-3 fatty acids 1000 MG capsule Take 1 g by mouth daily    Yes Historical Provider, MD   therapeutic multivitamin-minerals (THERAGRAN-M) tablet Take 1 tablet by mouth daily. occasionally   Yes Historical Provider, MD   melatonin 3 MG TABS tablet Take 3 mg by mouth daily    Historical Provider, MD   Coenzyme Q10 (CO Q 10) 100 MG CAPS Take 200 mg by mouth daily. Historical Provider, MD       Current medications:    Current Facility-Administered Medications   Medication Dose Route Frequency Provider Last Rate Last Admin    0.45 % sodium chloride infusion   Intravenous Continuous Ger Wills MD           Allergies: Allergies   Allergen Reactions    Seasonal      hayfever per pt.        Problem List:    Patient Active Problem List   Diagnosis Code    Hyperlipidemia E78.5    GERD (gastroesophageal reflux disease) K21.9    Chronic allergic rhinitis J30.9    Benign non-nodular prostatic hyperplasia with lower urinary tract symptoms N40.1    IFG (impaired fasting glucose) R73.01    Obstructive sleep apnea on CPAP G47.33, Z99.89    Insomnia G47.00    Erectile dysfunction N52.9    Adjustment disorder with depressed mood F43.21       Past Medical History:        Diagnosis Date    Allergic     Allergic rhinitis     Arthritis     Back pain     Depression     GERD (gastroesophageal reflux disease)     Hyperlipidemia     Prostatism     Unspecified sleep apnea        Past Surgical History:        Procedure Laterality Date    CARDIAC CATHETERIZATION      COLONOSCOPY  2011  EYE SURGERY      OTHER SURGICAL HISTORY      tendonitus repair     RETINAL DETACHMENT SURGERY         Social History:    Social History     Tobacco Use    Smoking status: Never Smoker    Smokeless tobacco: Never Used   Substance Use Topics    Alcohol use: Yes     Types: 1 Shots of liquor per week     Comment: rare                                Counseling given: Not Answered      Vital Signs (Current):   Vitals:    01/15/21 0955 01/15/21 1004   BP: 127/78    Pulse: 66    Resp: 16    Temp: 36.8 °C (98.2 °F)    TempSrc: Temporal    SpO2: 95%    Weight:  172 lb 6.4 oz (78.2 kg)   Height:  5' 8\" (1.727 m)                                              BP Readings from Last 3 Encounters:   01/15/21 127/78   11/20/20 136/78   08/11/20 116/80       NPO Status: Time of last liquid consumption: 2300                        Time of last solid consumption: 2300                        Date of last liquid consumption: 01/14/21                        Date of last solid food consumption: 01/14/21    BMI:   Wt Readings from Last 3 Encounters:   01/15/21 172 lb 6.4 oz (78.2 kg)   11/20/20 173 lb (78.5 kg)   08/11/20 173 lb (78.5 kg)     Body mass index is 26.21 kg/m². CBC:   Lab Results   Component Value Date    WBC 6.8 12/12/2019    RBC 4.74 12/12/2019    RBC 4.93 05/24/2016    HGB 14.7 12/12/2019    HCT 45.1 12/12/2019    MCV 95.1 12/12/2019    RDW 12.9 06/07/2017     12/12/2019       CMP:   Lab Results   Component Value Date     08/03/2020    K 4.1 08/03/2020     08/03/2020    CO2 31 08/03/2020    BUN 13 08/03/2020    CREATININE 1.17 08/03/2020    LABGLOM 75 12/12/2019    GLUCOSE 106 08/03/2020    PROT 7.0 08/03/2020    CALCIUM 9.4 08/03/2020    BILITOT 1.0 08/03/2020    ALKPHOS 91 08/03/2020    ALKPHOS 82 12/12/2019    AST 18 08/03/2020    ALT 19 08/03/2020       POC Tests: No results for input(s): POCGLU, POCNA, POCK, POCCL, POCBUN, POCHEMO, POCHCT in the last 72 hours. Coags: No results found for: PROTIME, INR, APTT    HCG (If Applicable): No results found for: PREGTESTUR, PREGSERUM, HCG, HCGQUANT     ABGs: No results found for: PHART, PO2ART, AMX8UON, OQK9KJM, BEART, Q4LWANDD     Type & Screen (If Applicable):  No results found for: LABABO, LABRH    Drug/Infectious Status (If Applicable):  No results found for: HIV, HEPCAB    COVID-19 Screening (If Applicable):   Lab Results   Component Value Date    COVID19 Not Detected 01/08/2021         Anesthesia Evaluation  Patient summary reviewed and Nursing notes reviewed no history of anesthetic complications:   Airway: Mallampati: III  TM distance: >3 FB   Neck ROM: full  Mouth opening: > = 3 FB Dental: normal exam         Pulmonary:   (+) sleep apnea: on CPAP,                             Cardiovascular:  Exercise tolerance: good (>4 METS),   (+) hyperlipidemia                  Neuro/Psych:   (+) psychiatric history:depression/anxiety             GI/Hepatic/Renal:   (+) GERD: poorly controlled,           Endo/Other:    (+) : arthritis:., .                 Abdominal:           Vascular: negative vascular ROS. Anesthesia Plan      MAC     ASA 2             Anesthetic plan and risks discussed with patient. Plan discussed with attending.                   ROSMERY Dove - CRNA   1/15/2021

## 2021-01-18 ENCOUNTER — TELEPHONE (OUTPATIENT)
Dept: FAMILY MEDICINE CLINIC | Age: 66
End: 2021-01-18

## 2021-01-29 ENCOUNTER — OFFICE VISIT (OUTPATIENT)
Dept: BARIATRICS/WEIGHT MGMT | Age: 66
End: 2021-01-29
Payer: MEDICARE

## 2021-01-29 VITALS
BODY MASS INDEX: 25.52 KG/M2 | SYSTOLIC BLOOD PRESSURE: 96 MMHG | TEMPERATURE: 94.8 F | HEART RATE: 72 BPM | DIASTOLIC BLOOD PRESSURE: 60 MMHG | WEIGHT: 168.4 LBS | HEIGHT: 68 IN

## 2021-01-29 DIAGNOSIS — K21.00 GASTROESOPHAGEAL REFLUX DISEASE WITH ESOPHAGITIS WITHOUT HEMORRHAGE: Primary | ICD-10-CM

## 2021-01-29 PROCEDURE — G8417 CALC BMI ABV UP PARAM F/U: HCPCS | Performed by: SURGERY

## 2021-01-29 PROCEDURE — 99213 OFFICE O/P EST LOW 20 MIN: CPT | Performed by: SURGERY

## 2021-01-29 PROCEDURE — 1123F ACP DISCUSS/DSCN MKR DOCD: CPT | Performed by: SURGERY

## 2021-01-29 PROCEDURE — G8484 FLU IMMUNIZE NO ADMIN: HCPCS | Performed by: SURGERY

## 2021-01-29 PROCEDURE — 4040F PNEUMOC VAC/ADMIN/RCVD: CPT | Performed by: SURGERY

## 2021-01-29 PROCEDURE — 3017F COLORECTAL CA SCREEN DOC REV: CPT | Performed by: SURGERY

## 2021-01-29 PROCEDURE — 1036F TOBACCO NON-USER: CPT | Performed by: SURGERY

## 2021-01-29 PROCEDURE — G8427 DOCREV CUR MEDS BY ELIG CLIN: HCPCS | Performed by: SURGERY

## 2021-01-29 RX ORDER — FLUOROURACIL 50 MG/G
CREAM TOPICAL
COMMUNITY
Start: 2021-01-11 | End: 2021-03-05

## 2021-01-31 ASSESSMENT — ENCOUNTER SYMPTOMS
NAUSEA: 0
VOICE CHANGE: 0
COUGH: 0
BACK PAIN: 0
ALLERGIC/IMMUNOLOGIC NEGATIVE: 1
CHEST TIGHTNESS: 0
ABDOMINAL DISTENTION: 0
CONSTIPATION: 0
ABDOMINAL PAIN: 0
CHOKING: 0
BLOOD IN STOOL: 0
VOMITING: 0
SHORTNESS OF BREATH: 0
EYE DISCHARGE: 0
APNEA: 0
WHEEZING: 0
STRIDOR: 0
ANAL BLEEDING: 0
EYE REDNESS: 0
RHINORRHEA: 0
SINUS PRESSURE: 0
SORE THROAT: 0
EYE PAIN: 0
PHOTOPHOBIA: 0
DIARRHEA: 0
TROUBLE SWALLOWING: 0
COLOR CHANGE: 0
FACIAL SWELLING: 0
RECTAL PAIN: 0
EYE ITCHING: 0

## 2021-01-31 NOTE — PROGRESS NOTES
Pritiia Siemens (:  1955)     ASSESSMENT:  1. Chronic progressive gastroesophageal reflux disease  2. Wiseman's esophagus    PLAN:  1. Esophagram/upper GI reviewed with patient. Within normal limits. 2.  Upper endoscopy with pH Bravo reviewed with patient. Normal DeMeester score at 4. Patient clinically has classic signs and symptoms of significant reflux disease. Obtain 24-hour pH probe. 3.  Dietary and lifestyle modification/restrictions discussed with patient in regards to GERD/heartburn. 4.  Nutritional education occurred today. 5.  Continue antacid/PPI as directed. Currently on Nexium and Dexilant. 6.  Follow-up after imaging completed  7. Discussion again with patient about the pros and cons of magnetic sphincter augmentation (LINX) insertion and fundoplication in regards to improvement of GERD versus continued conservative treatment plan. 8.  Signs and symptoms reviewed with patient that would be concerning and need him to return to office for re-evaluation. Patient states He will call if He has questions or concerns.     SUBJECTIVE/OBJECTIVE:    Chief Complaint   Patient presents with    Consultation     follow up after Bravo     HPI Braulio Whittington is a 28-year-old male presents for follow-up due to medical refractory gastroesophageal reflux disease. Esophagram obtained and within normal limits other than some tertiary contractions. pH Bravo demonstrated no significant reflux disease. DeMeester score 4. Patient surprised that pH probe testing. He complains of persistent continued reflux disease. Still taking Nexium and Dexilant. Has been trying to maximize lifestyle dietary modifications with limited success. Still having significant heartburn daily. Daily recurrent breakthrough symptoms occur. Medications seem to help but still having issues. Has to sleep in a recliner. History of focal esophageal metaplasia. No significant hiatal hernia seen on previous imaging. Denies generalized abdominal pain. No new urinary complaints. No hematochezia or melena. He again states he has been ruled out from a heart standpoint in regards to symptoms. Review of Systems   Constitutional: Negative for activity change, appetite change, chills, diaphoresis, fatigue, fever and unexpected weight change. HENT: Negative for congestion, dental problem, drooling, ear discharge, ear pain, facial swelling, hearing loss, mouth sores, nosebleeds, postnasal drip, rhinorrhea, sinus pressure, sneezing, sore throat, tinnitus, trouble swallowing and voice change. Eyes: Negative for photophobia, pain, discharge, redness, itching and visual disturbance. Respiratory: Negative for apnea, cough, choking, chest tightness, shortness of breath, wheezing and stridor. Cardiovascular: Negative for chest pain, palpitations and leg swelling. Gastrointestinal: Negative for abdominal distention, abdominal pain, anal bleeding, blood in stool, constipation, diarrhea, nausea, rectal pain and vomiting. Endocrine: Negative. Genitourinary: Negative for decreased urine volume, difficulty urinating, discharge, dysuria, enuresis, flank pain, frequency, genital sores, hematuria, penile pain, penile swelling, scrotal swelling, testicular pain and urgency. Musculoskeletal: Negative for arthralgias, back pain, gait problem, joint swelling, myalgias, neck pain and neck stiffness. Skin: Negative for color change, pallor, rash and wound. Allergic/Immunologic: Negative. Neurological: Negative for dizziness, tremors, seizures, syncope, facial asymmetry, speech difficulty, weakness, light-headedness, numbness and headaches. Hematological: Negative for adenopathy. Does not bruise/bleed easily. Psychiatric/Behavioral: Negative for agitation, behavioral problems, confusion, decreased concentration, dysphoric mood, hallucinations, self-injury, sleep disturbance and suicidal ideas. The patient is not nervous/anxious and is not hyperactive.         Past Medical History:   Diagnosis Date    Allergic     Allergic rhinitis     Arthritis     Back pain     Depression     GERD (gastroesophageal reflux disease)     Hyperlipidemia     Prostatism     Unspecified sleep apnea        Past Surgical History:   Procedure Laterality Date    CARDIAC CATHETERIZATION      COLONOSCOPY  2011    EYE SURGERY      OTHER SURGICAL HISTORY      tendonitus repair     RETINAL DETACHMENT SURGERY      UPPER GASTROINTESTINAL ENDOSCOPY N/A 1/15/2021    RANDALL performed by Arnaud Blevins MD at [address] [place] [address]  1/15/2021    EGD BIOPSY performed by Arnaud Blevins MD at [facility]       Current Outpatient Medications   Medication Sig Dispense Refill    Eszopiclone (LUNESTA PO) Take by mouth      lansoprazole (PREVACID SOLUTAB) 30 MG disintegrating tablet Take 30 mg by mouth daily      Calcium Carbonate Antacid 1000 MG CHEW Take by mouth      calcium carbonate 600 MG TABS tablet Take 1 tablet by mouth daily  Probiotic Product (PROBIOTIC PO) Take by mouth      dexlansoprazole (DEXILANT) 60 MG CPDR delayed release capsule Take 1 capsule by mouth every evening      Plant Sterol Stanol-Pantethine (CHOLESTOFF COMPLETE) 300-100 MG CAPS       tadalafil (CIALIS) 5 MG tablet Take 1 tablet by mouth as needed for Erectile Dysfunction 30 tablet 0    UNABLE TO FIND Domperidone- 10 mg 4x/day per Dr. Lilly Story for GERD.  esomeprazole Magnesium (NEXIUM) 40 MG PACK Take 40 mg by mouth daily      traZODone (DESYREL) 50 MG tablet TAKE 1/2-1 TABLET BY MOUTH AT BEDTIME 30 tablet 5    tamsulosin (FLOMAX) 0.4 MG capsule Take 2 capsules by mouth daily 180 capsule 3    atorvastatin (LIPITOR) 10 MG tablet TAKE 1 TABLET BY MOUTH ONE TIME A DAY  90 tablet 3    buPROPion (WELLBUTRIN XL) 150 MG extended release tablet TAKE 1 TABLET BY MOUTH IN THE MORNING  90 tablet 3    fluticasone (FLONASE) 50 MCG/ACT nasal spray 2 sprays by Nasal route daily. 3 Bottle 3    loratadine (CLARITIN) 10 MG tablet Take 10 mg by mouth daily       aspirin 81 MG EC tablet Take 81 mg by mouth daily.  fish oil-omega-3 fatty acids 1000 MG capsule Take 1 g by mouth daily       therapeutic multivitamin-minerals (THERAGRAN-M) tablet Take 1 tablet by mouth daily. occasionally      fluorouracil (EFUDEX) 5 % cream apply to the affected area twice daily for 2 to 3 week treatment cycles as outlined at the office       No current facility-administered medications for this visit. Allergies   Allergen Reactions    Seasonal      hayfever per pt.        Family History   Problem Relation Age of Onset    Arthritis Mother     Hearing Loss Mother     High Blood Pressure Mother     High Cholesterol Mother     Stroke Mother     Cancer Mother     Depression Father     Dementia Father     Arthritis Father     Diabetes Father     Birth Defects Sister     Diabetes Paternal Grandfather     High Blood Pressure Brother     Arthritis Brother  Other Brother         heart palpatations    Cancer Maternal Grandfather     Cancer Maternal Grandmother        Social History     Socioeconomic History    Marital status:      Spouse name: Not on file    Number of children: Not on file    Years of education: Not on file    Highest education level: Not on file   Occupational History    Not on file   Social Needs    Financial resource strain: Not on file    Food insecurity     Worry: Not on file     Inability: Not on file   [employer] needs     Medical: Not on file     Non-medical: Not on file   Tobacco Use    Smoking status: Never Smoker    Smokeless tobacco: Never Used   Substance and Sexual Activity    Alcohol use: Yes     Types: 1 Shots of liquor per week     Comment: rare    Drug use: No    Sexual activity: Yes     Partners: Female   Lifestyle    Physical activity     Days per week: Not on file     Minutes per session: Not on file    Stress: Not on file   Relationships    Social connections     Talks on phone: Not on file     Gets together: Not on file     Attends [employer] service: Not on file     Active member of club or organization: Not on file     Attends meetings of clubs or organizations: Not on file     Relationship status: Not on file    Intimate partner violence     Fear of current or ex partner: Not on file     Emotionally abused: Not on file     Physically abused: Not on file     Forced sexual activity: Not on file   Other Topics Concern    Not on file   Social History Narrative    Not on file     Vitals:    01/29/21 0908   BP: 96/60   Site: Right Upper Arm   Position: Sitting   Cuff Size: Large Adult   Pulse: 72   Temp: 94.8 °F (34.9 °C)   TempSrc: Infrared   Weight: 168 lb 6.4 oz (76.4 kg)   Height: 5' 8\" (1.727 m)     Body mass index is 25.61 kg/m².     Wt Readings from Last 3 Encounters:   01/29/21 168 lb 6.4 oz (76.4 kg)   01/15/21 172 lb 6.4 oz (78.2 kg)   11/20/20 173 lb (78.5 kg)     Physical Exam Vitals signs reviewed. Constitutional:       General: He is not in acute distress. Appearance: He is well-developed. He is not diaphoretic. HENT:      Head: Normocephalic and atraumatic. Right Ear: External ear normal.      Left Ear: External ear normal.      Nose: Nose normal.   Eyes:      General: No scleral icterus. Right eye: No discharge. Left eye: No discharge. Conjunctiva/sclera: Conjunctivae normal.   Neck:      Musculoskeletal: Normal range of motion and neck supple. Cardiovascular:      Rate and Rhythm: Normal rate and regular rhythm. Heart sounds: Normal heart sounds. Pulmonary:      Effort: Pulmonary effort is normal. No respiratory distress. Breath sounds: Normal breath sounds. No wheezing or rales. Chest:      Chest wall: No tenderness. Abdominal:      General: Bowel sounds are normal. There is no distension. Palpations: Abdomen is soft. There is no mass. Tenderness: There is no abdominal tenderness. There is no guarding or rebound. Musculoskeletal: Normal range of motion. General: No tenderness. Skin:     General: Skin is warm and dry. Coloration: Skin is not pale. Findings: No erythema or rash. Neurological:      Mental Status: He is alert and oriented to person, place, and time. Cranial Nerves: No cranial nerve deficit. Psychiatric:         Behavior: Behavior normal.         Thought Content:  Thought content normal.         Judgment: Judgment normal.         Lab Results   Component Value Date     08/03/2020    K 4.1 08/03/2020     08/03/2020    CO2 31 08/03/2020     Lab Results   Component Value Date    CREATININE 1.17 08/03/2020     Lab Results   Component Value Date    WBC 6.8 12/12/2019    HGB 14.7 12/12/2019    HCT 45.1 12/12/2019    MCV 95.1 (H) 12/12/2019     12/12/2019     Lab Results   Component Value Date    ALT 19 08/03/2020    AST 18 08/03/2020    ALKPHOS 91 08/03/2020

## 2021-02-05 ENCOUNTER — TELEPHONE (OUTPATIENT)
Dept: BARIATRICS/WEIGHT MGMT | Age: 66
End: 2021-02-05

## 2021-02-05 DIAGNOSIS — K21.00 GASTROESOPHAGEAL REFLUX DISEASE WITH ESOPHAGITIS WITHOUT HEMORRHAGE: Primary | ICD-10-CM

## 2021-02-11 ENCOUNTER — HOSPITAL ENCOUNTER (OUTPATIENT)
Age: 66
Setting detail: SPECIMEN
Discharge: HOME | End: 2021-02-11
Payer: MEDICARE

## 2021-02-11 PROCEDURE — U0003 INFECTIOUS AGENT DETECTION BY NUCLEIC ACID (DNA OR RNA); SEVERE ACUTE RESPIRATORY SYNDROME CORONAVIRUS 2 (SARS-COV-2) (CORONAVIRUS DISEASE [COVID-19]), AMPLIFIED PROBE TECHNIQUE, MAKING USE OF HIGH THROUGHPUT TECHNOLOGIES AS DESCRIBED BY CMS-2020-01-R: HCPCS

## 2021-02-12 ENCOUNTER — TELEPHONE (OUTPATIENT)
Dept: BARIATRICS/WEIGHT MGMT | Age: 66
End: 2021-02-12

## 2021-02-13 LAB — SARS-COV-2: NOT DETECTED

## 2021-02-16 ENCOUNTER — HOSPITAL ENCOUNTER (OUTPATIENT)
Age: 66
Setting detail: OUTPATIENT SURGERY
Discharge: HOME | End: 2021-02-16
Attending: INTERNAL MEDICINE | Admitting: INTERNAL MEDICINE
Payer: MEDICARE

## 2021-02-16 VITALS
RESPIRATION RATE: 16 BRPM | HEIGHT: 68 IN | BODY MASS INDEX: 25.16 KG/M2 | DIASTOLIC BLOOD PRESSURE: 73 MMHG | SYSTOLIC BLOOD PRESSURE: 130 MMHG | TEMPERATURE: 98 F | WEIGHT: 166 LBS | HEART RATE: 89 BPM | OXYGEN SATURATION: 95 %

## 2021-02-16 PROCEDURE — 3604323500 HC GERD TEST W/ELECTRODE (BRAVO): Performed by: INTERNAL MEDICINE

## 2021-02-16 RX ORDER — LIDOCAINE HYDROCHLORIDE 20 MG/ML
JELLY TOPICAL
Status: DISCONTINUED
Start: 2021-02-16 | End: 2021-02-16 | Stop reason: HOSPADM

## 2021-02-16 ASSESSMENT — PAIN - FUNCTIONAL ASSESSMENT: PAIN_FUNCTIONAL_ASSESSMENT: 0-10

## 2021-02-16 NOTE — PROGRESS NOTES
Patient admitted to the endoscopy department for 24 pH. Consent signed. .    Comfortec Z probe passed Lot # [id number] inserted into the right nare to 53*cm and secured. Discharge instructions given to patient, understanding verbalized. Patient tolerated procedure. Patient discharged home per self.

## 2021-02-18 ENCOUNTER — TELEPHONE (OUTPATIENT)
Dept: FAMILY MEDICINE CLINIC | Age: 66
End: 2021-02-18

## 2021-02-18 NOTE — TELEPHONE ENCOUNTER
[facility] Transitions Initial Follow Up Call    Call within 2 business days of discharge: Yes     Patient: Salomón Dacosta Patient : 1955 MRN: [id number]    [unfilled]    RARS: No data recorded     Spoke with: patient    Discharge department/facility: [facility]    Non-face-to-face services provided:  Scheduled appointment with Specialist-dr barnes    Follow Up  Future Appointments   Date Time Provider Jenelle Haney   2021  2:15 PM Alex Tubbs MD N [facility] - [facility]   2021  8:15 AM Leticia Schreiber, [address]   2021  [address] [facility], [address] [place], [place] [address]way [address] [place] [address] ([facility])

## 2021-02-19 NOTE — PROCEDURES
[address] [place], [place]  [address] Monitoring Report     Patient Data   Clinician Data  Patient Name: Cam Stanley  Patient ID: [id number] Physician: DR. Lv Lizarraga  Patient Sex: Male Referring Physician: Tarah Arriola  Date of Birth: [birth year] Performed by: Endo Nurses  Date of Admission: 2021-02-16  Visit ID:     Procedure Data  Procedure Start: 2021-02-16 08:43:24  Procedure Duration: 24:32:12  Catheter Depth: N/A    Reflux Analysis Settings  Analysis Duration Upright: 15:45:59  Analysis Duration Recumbent: 07:03:23  Analysis Duration Total: 22:49:23    Patient History  Symptom(s):Burning, Acid, Reflux      Medications:Nexium 40 mg 1xdaily  & Dexilant 60 mg  1x daily     Impressions  Acid exposure indicates - acid reflux  Deemeester score 212.5 indicates - acid reflux  Impedence indicates - acid reflux  Symptom Index (SI) - correlates with acid reflux   Symptom Association Probability (SAP) - NO correlates with acid reflux    Exam consistent with:  ACID REFLUX    Recommendations  Consider increasing PPI or adding bethanechol or baclofen  Fundoplication is also an option      Enrique Weiner MD  2:44 PM 2/19/2021

## 2021-02-26 ENCOUNTER — OFFICE VISIT (OUTPATIENT)
Dept: BARIATRICS/WEIGHT MGMT | Age: 66
End: 2021-02-26
Payer: MEDICARE

## 2021-02-26 VITALS
WEIGHT: 169.6 LBS | TEMPERATURE: 97.7 F | HEIGHT: 67 IN | BODY MASS INDEX: 26.62 KG/M2 | SYSTOLIC BLOOD PRESSURE: 102 MMHG | RESPIRATION RATE: 18 BRPM | DIASTOLIC BLOOD PRESSURE: 60 MMHG | HEART RATE: 72 BPM

## 2021-02-26 DIAGNOSIS — K21.9 GASTROESOPHAGEAL REFLUX DISEASE WITHOUT ESOPHAGITIS: ICD-10-CM

## 2021-02-26 DIAGNOSIS — Z01.818 PREOP EXAMINATION: Primary | ICD-10-CM

## 2021-02-26 DIAGNOSIS — K21.00 GASTROESOPHAGEAL REFLUX DISEASE WITH ESOPHAGITIS WITHOUT HEMORRHAGE: Primary | ICD-10-CM

## 2021-02-26 PROCEDURE — G8427 DOCREV CUR MEDS BY ELIG CLIN: HCPCS | Performed by: SURGERY

## 2021-02-26 PROCEDURE — G8417 CALC BMI ABV UP PARAM F/U: HCPCS | Performed by: SURGERY

## 2021-02-26 PROCEDURE — 4040F PNEUMOC VAC/ADMIN/RCVD: CPT | Performed by: SURGERY

## 2021-02-26 PROCEDURE — 1123F ACP DISCUSS/DSCN MKR DOCD: CPT | Performed by: SURGERY

## 2021-02-26 PROCEDURE — 99213 OFFICE O/P EST LOW 20 MIN: CPT | Performed by: SURGERY

## 2021-02-26 PROCEDURE — 1036F TOBACCO NON-USER: CPT | Performed by: SURGERY

## 2021-02-26 PROCEDURE — G8484 FLU IMMUNIZE NO ADMIN: HCPCS | Performed by: SURGERY

## 2021-02-26 PROCEDURE — 3017F COLORECTAL CA SCREEN DOC REV: CPT | Performed by: SURGERY

## 2021-02-27 ASSESSMENT — ENCOUNTER SYMPTOMS
CONSTIPATION: 0
NAUSEA: 0
EYE DISCHARGE: 0
SHORTNESS OF BREATH: 0
CHEST TIGHTNESS: 0
RECTAL PAIN: 0
WHEEZING: 0
EYE REDNESS: 0
SORE THROAT: 0
EYE PAIN: 0
ALLERGIC/IMMUNOLOGIC NEGATIVE: 1
ABDOMINAL DISTENTION: 0
VOICE CHANGE: 0
PHOTOPHOBIA: 0
DIARRHEA: 0
COUGH: 1
BACK PAIN: 1
RHINORRHEA: 0
VOMITING: 0
BLOOD IN STOOL: 0
STRIDOR: 0
EYE ITCHING: 0
COLOR CHANGE: 0
FACIAL SWELLING: 0
ANAL BLEEDING: 0
TROUBLE SWALLOWING: 0
APNEA: 0
CHOKING: 0
SINUS PRESSURE: 0
ABDOMINAL PAIN: 0

## 2021-02-27 NOTE — PROGRESS NOTES
Salomón Dacosta (:  1955)     ASSESSMENT:  1. Chronic progressive gastroesophageal reflux disease  2. Wiseman's esophagus    PLAN:  1. Schedule Rajendra for robotic magnetic sphincter augmentation (LINX) insertion; hiatal hernia repair if identified intraoperatively. 2. He will undergo pre-operative clearance per anesthesia guidelines with risk factors listed under the past medical history diagnosis & problem list.  3. The risks, benefits and alternatives were discussed with Juan Pablo Galindo including non-operative management. The pros and cons of robotic, laparoscopic and open techniques were discussed. The pros and cons of mesh insertion were discussed. The pros and cons of magnetic sphincter augmentation and fundoplication discussed in detail. All questions answered. He understands and wishes to proceed with surgical intervention. 4. Restrictions discussed with Juan Pablo Galindo and he expresses understanding. 5. He is advised to call back directly if there are further questions/concerns, or if his symptoms worsen prior to surgery. 6.  Reviewed 24-hour pH probe study results. 7.  Dietary and lifestyle modification/restrictions discussed with patient in regards to GERD/heartburn. 8.  Continue antacid/PPI as directed. Currently on Nexium and Dexilant. 9.  Nutritional education occurred today.       SUBJECTIVE/OBJECTIVE:    Chief Complaint   Patient presents with    Follow-up     f/u test results      HPI Gita Bella is a 70-year-old male who presents for follow-up due to medical refractory gastroesophageal reflux disease associated with Wiseman's esophagus. No dysplasia. Underwent 24-hour pH probe. Had a significantly elevated DeMeester score above 200. Has classic signs and symptoms of significant GERD/heartburn. He states his symptoms have remained the same. Much worse when he had come off of the PPI medications for the test.  Still sleeping in a recliner with marginal success. Daily symptoms. Has maximized dietary and lifestyle modifications with marginal success. Denies current chest pain. No abdominal pain. Chronic cough. Chronic back pain. Chronic urgency/frequency. No hematochezia or melena. Wishing to proceed with surgery if possible in hopes to improve his GERD. Review of Systems   Constitutional: Negative for activity change, appetite change, chills, diaphoresis, fatigue, fever and unexpected weight change. HENT: Negative for congestion, dental problem, drooling, ear discharge, ear pain, facial swelling, hearing loss, mouth sores, nosebleeds, postnasal drip, rhinorrhea, sinus pressure, sneezing, sore throat, tinnitus, trouble swallowing and voice change. Eyes: Negative for photophobia, pain, discharge, redness, itching and visual disturbance. Respiratory: Positive for cough. Negative for apnea, choking, chest tightness, shortness of breath, wheezing and stridor. Cardiovascular: Negative for chest pain, palpitations and leg swelling. Gastrointestinal: Negative for abdominal distention, abdominal pain, anal bleeding, blood in stool, constipation, diarrhea, nausea, rectal pain and vomiting. Endocrine: Negative. Genitourinary: Positive for frequency and urgency. Negative for decreased urine volume, difficulty urinating, discharge, dysuria, enuresis, flank pain, genital sores, hematuria, penile pain, penile swelling, scrotal swelling and testicular pain. Musculoskeletal: Positive for back pain. Negative for arthralgias, gait problem, joint swelling, myalgias, neck pain and neck stiffness. Skin: Negative for color change, pallor, rash and wound. Allergic/Immunologic: Negative. Neurological: Negative for dizziness, tremors, seizures, syncope, facial asymmetry, speech difficulty, weakness, light-headedness, numbness and headaches. Hematological: Negative for adenopathy. Does not bruise/bleed easily. Psychiatric/Behavioral: Negative for agitation, behavioral problems, confusion, decreased concentration, dysphoric mood, hallucinations, self-injury, sleep disturbance and suicidal ideas. The patient is not nervous/anxious and is not hyperactive.         Past Medical History:   Diagnosis Date    Allergic     Allergic rhinitis     Arthritis     Back pain     Depression     GERD (gastroesophageal reflux disease)     Hyperlipidemia     Prostatism     Unspecified sleep apnea        Past Surgical History:   Procedure Laterality Date    CARDIAC CATHETERIZATION      COLONOSCOPY  2011    ESOPHAGEAL PH MONITORING N/A 2/16/2021    ESOPHAGEAL PH MONITORING (NO EGD) performed by Zac Nguyen MD at [address] [id number] OTHER SURGICAL HISTORY      tendonitus repair     RETINAL DETACHMENT SURGERY      UPPER GASTROINTESTINAL ENDOSCOPY N/A 1/15/2021    BRAVO performed by Sarai Contreras MD at [address] [place] [address]  1/15/2021    EGD BIOPSY performed by Sarai Contrersa MD at [facility]       Current Outpatient Medications   Medication Sig Dispense Refill    fluorouracil (EFUDEX) 5 % cream apply to the affected area twice daily for 2 to 3 week treatment cycles as outlined at the office      Eszopiclone (LUNESTA PO) Take by mouth      lansoprazole (PREVACID SOLUTAB) 30 MG disintegrating tablet Take 30 mg by mouth daily      Calcium Carbonate Antacid 1000 MG CHEW Take by mouth  calcium carbonate 600 MG TABS tablet Take 1 tablet by mouth daily      Probiotic Product (PROBIOTIC PO) Take by mouth      dexlansoprazole (DEXILANT) 60 MG CPDR delayed release capsule Take 1 capsule by mouth every evening      Plant Sterol Stanol-Pantethine (CHOLESTOFF COMPLETE) 300-100 MG CAPS       tadalafil (CIALIS) 5 MG tablet Take 1 tablet by mouth as needed for Erectile Dysfunction 30 tablet 0    esomeprazole Magnesium (NEXIUM) 40 MG PACK Take 40 mg by mouth daily      tamsulosin (FLOMAX) 0.4 MG capsule Take 2 capsules by mouth daily 180 capsule 3    atorvastatin (LIPITOR) 10 MG tablet TAKE 1 TABLET BY MOUTH ONE TIME A DAY  90 tablet 3    buPROPion (WELLBUTRIN XL) 150 MG extended release tablet TAKE 1 TABLET BY MOUTH IN THE MORNING  90 tablet 3    fluticasone (FLONASE) 50 MCG/ACT nasal spray 2 sprays by Nasal route daily. 3 Bottle 3    loratadine (CLARITIN) 10 MG tablet Take 10 mg by mouth daily       aspirin 81 MG EC tablet Take 81 mg by mouth daily.  fish oil-omega-3 fatty acids 1000 MG capsule Take 1 g by mouth daily       therapeutic multivitamin-minerals (THERAGRAN-M) tablet Take 1 tablet by mouth daily. occasionally      UNABLE TO FIND Domperidone- 10 mg 4x/day per Dr. Yue Ivan for GERD.  traZODone (DESYREL) 50 MG tablet TAKE 1/2-1 TABLET BY MOUTH AT BEDTIME (Patient not taking: Reported on 2/26/2021) 30 tablet 5     No current facility-administered medications for this visit. Allergies   Allergen Reactions    Seasonal      hayfever per pt.        Family History   Problem Relation Age of Onset    Arthritis Mother     Hearing Loss Mother     High Blood Pressure Mother     High Cholesterol Mother     Stroke Mother     Cancer Mother     Depression Father     Dementia Father     Arthritis Father     Diabetes Father     Birth Defects Sister     Diabetes Paternal Grandfather     High Blood Pressure Brother     Arthritis Brother     Other Brother heart palpatations    Cancer Maternal Grandfather     Cancer Maternal Grandmother        Social History     Socioeconomic History    Marital status:      Spouse name: Not on file    Number of children: Not on file    Years of education: Not on file    Highest education level: Not on file   Occupational History    Not on file   Social Needs    Financial resource strain: Not on file    Food insecurity     Worry: Not on file     Inability: Not on file    Transportation needs     Medical: Not on file     Non-medical: Not on file   Tobacco Use    Smoking status: Never Smoker    Smokeless tobacco: Never Used   Substance and Sexual Activity    Alcohol use: Yes     Types: 1 Shots of liquor per week     Comment: rare    Drug use: No    Sexual activity: Yes     Partners: Female   Lifestyle    Physical activity     Days per week: Not on file     Minutes per session: Not on file    Stress: Not on file   Relationships    Social connections     Talks on phone: Not on file     Gets together: Not on file     Attends Christianity service: Not on file     Active member of club or organization: Not on file     Attends meetings of clubs or organizations: Not on file     Relationship status: Not on file    Intimate partner violence     Fear of current or ex partner: Not on file     Emotionally abused: Not on file     Physically abused: Not on file     Forced sexual activity: Not on file   Other Topics Concern    Not on file   Social History Narrative    Not on file     Vitals:    02/26/21 1418   BP: 102/60   Site: Left Upper Arm   Position: Sitting   Cuff Size: Large Adult   Pulse: 72   Resp: 18   Temp: 97.7 °F (36.5 °C)   TempSrc: Infrared   Weight: 169 lb 9.6 oz (76.9 kg)   Height: 5' 7\" (1.702 m)     Body mass index is 26.56 kg/m².     Wt Readings from Last 3 Encounters:   02/26/21 169 lb 9.6 oz (76.9 kg)   02/16/21 166 lb (75.3 kg)   01/29/21 168 lb 6.4 oz (76.4 kg)     Physical Exam Vitals signs reviewed. Constitutional:       General: He is not in acute distress. Appearance: He is well-developed. He is not diaphoretic. HENT:      Head: Normocephalic and atraumatic. Right Ear: External ear normal.      Left Ear: External ear normal.      Nose: Nose normal.   Eyes:      General: No scleral icterus. Right eye: No discharge. Left eye: No discharge. Conjunctiva/sclera: Conjunctivae normal.   Neck:      Musculoskeletal: Normal range of motion and neck supple. Cardiovascular:      Rate and Rhythm: Normal rate and regular rhythm. Heart sounds: Normal heart sounds. Pulmonary:      Effort: Pulmonary effort is normal. No respiratory distress. Breath sounds: Normal breath sounds. No wheezing or rales. Chest:      Chest wall: No tenderness. Abdominal:      General: Bowel sounds are normal. There is no distension. Palpations: Abdomen is soft. There is no mass. Tenderness: There is no abdominal tenderness. There is no guarding or rebound. Musculoskeletal: Normal range of motion. General: No tenderness. Skin:     General: Skin is warm and dry. Coloration: Skin is not pale. Findings: No erythema or rash. Neurological:      Mental Status: He is alert and oriented to person, place, and time. Cranial Nerves: No cranial nerve deficit. Psychiatric:         Behavior: Behavior normal.         Thought Content:  Thought content normal.         Judgment: Judgment normal.       Lab Results   Component Value Date     08/03/2020    K 4.1 08/03/2020     08/03/2020    CO2 31 08/03/2020     Lab Results   Component Value Date    CREATININE 1.17 08/03/2020     Lab Results   Component Value Date    WBC 6.8 12/12/2019    HGB 14.7 12/12/2019    HCT 45.1 12/12/2019    MCV 95.1 (H) 12/12/2019     12/12/2019       Impedance-pH Monitoring Report     Patient Data   Clinician Data   Patient Name: Paola Queen Patient ID: [id number] Physician: DR. Dominga Larsen   Patient Sex: Male Referring Physician: Anh Eric   YOB: 1955 Performed by: Endo Nurses   Date of Admission: 2021-02-16   Visit ID:     Procedure Data   Procedure Start: 2021-02-16 08:43:24   Procedure Duration: 24:32:12   Catheter Depth: N/A     Reflux Analysis Settings   Analysis Duration Upright: 15:45:59   Analysis Duration Recumbent: 07:03:23   Analysis Duration Total: 22:49:23     Patient History   Symptom(s):Burning, Acid, Reflux       Medications:Nexium 40 mg 1xdaily  & Dexilant 60 mg  1x daily     Impressions   Acid exposure indicates - acid reflux   Deemeester score 212.5 indicates - acid reflux   Impedence indicates - acid reflux   Symptom Index (SI) - correlates with acid reflux   Symptom Association Probability (SAP) - NO correlates with acid   reflux     Exam consistent with:  ACID REFLUX     Recommendations   Consider increasing PPI or adding bethanechol or baclofen   Fundoplication is also an option       Marisa Vásquez MD   2:44 PM 2/19/2021     Imaging -         Narrative   PROCEDURE: FL ESOPHAGRAM       CLINICAL INFORMATION: Gastroesophageal reflux disease with esophagitis without hemorrhage .       COMPARISON: No prior study.       TECHNIQUE: Double contrast examination with thin and thick barium, gas crystals, and solid food. Fluoroscopy time: 1.5 minutes   Images: 7   FINDINGS: The esophagus is normal in course and caliber. There is no difficulty in swallowing. No aspiration was visualized. Piriform fossa and vallecula are well outlined no masses identified. There is moderate tertiary peristaltic contractions. There is no hiatal hernia identified. No evidence of ulcers or erosions. The patient cleared the bagal, and marshmallow with 2-3 stripping waves           Impression   Mild to moderate tertiary peristaltic contractions. Clearance of the solid food with 2-3 stripping waves.    No hiatal hernia             **This report has been created using voice recognition software.  It may contain minor errors which are inherent in voice recognition technology. **       Final report electronically signed by Dr. Cristin Lino on 12/7/2020 8:50 AM         Patient Active Problem List   Diagnosis    Hyperlipidemia    GERD (gastroesophageal reflux disease)    Chronic allergic rhinitis    Benign non-nodular prostatic hyperplasia with lower urinary tract symptoms    IFG (impaired fasting glucose)    Obstructive sleep apnea on CPAP    Insomnia    Erectile dysfunction    Adjustment disorder with depressed mood     An electronic signature was used to authenticate this note.     --Gayle Hernandez MD

## 2021-03-01 ENCOUNTER — HOSPITAL ENCOUNTER (OUTPATIENT)
Age: 66
Discharge: HOME | End: 2021-03-01
Payer: MEDICARE

## 2021-03-01 ENCOUNTER — OFFICE VISIT (OUTPATIENT)
Dept: BARIATRICS/WEIGHT MGMT | Age: 66
End: 2021-03-01

## 2021-03-01 ENCOUNTER — HOSPITAL ENCOUNTER (OUTPATIENT)
Dept: GENERAL RADIOLOGY | Age: 66
Discharge: HOME | End: 2021-03-01
Payer: MEDICARE

## 2021-03-01 VITALS — TEMPERATURE: 97.2 F

## 2021-03-01 DIAGNOSIS — K21.9 GASTROESOPHAGEAL REFLUX DISEASE WITHOUT ESOPHAGITIS: ICD-10-CM

## 2021-03-01 DIAGNOSIS — Z01.818 PREOP EXAMINATION: ICD-10-CM

## 2021-03-01 DIAGNOSIS — K21.9 GASTROESOPHAGEAL REFLUX DISEASE WITHOUT ESOPHAGITIS: Primary | ICD-10-CM

## 2021-03-01 LAB
ANION GAP SERPL CALCULATED.3IONS-SCNC: 7 MEQ/L (ref 8–16)
BASOPHILS # BLD: 0.5 %
BASOPHILS ABSOLUTE: 0 THOU/MM3 (ref 0–0.1)
BUN BLDV-MCNC: 18 MG/DL (ref 7–22)
CALCIUM SERPL-MCNC: 9.5 MG/DL (ref 8.5–10.5)
CHLORIDE BLD-SCNC: 103 MEQ/L (ref 98–111)
CO2: 32 MEQ/L (ref 23–33)
CREAT SERPL-MCNC: 1.1 MG/DL (ref 0.4–1.2)
EKG ATRIAL RATE: 59 BPM
EKG P AXIS: 70 DEGREES
EKG P-R INTERVAL: 170 MS
EKG Q-T INTERVAL: 424 MS
EKG QRS DURATION: 88 MS
EKG QTC CALCULATION (BAZETT): 419 MS
EKG R AXIS: -29 DEGREES
EKG T AXIS: 48 DEGREES
EKG VENTRICULAR RATE: 59 BPM
EOSINOPHIL # BLD: 1.2 %
EOSINOPHILS ABSOLUTE: 0.1 THOU/MM3 (ref 0–0.4)
ERYTHROCYTE [DISTWIDTH] IN BLOOD BY AUTOMATED COUNT: 12.1 % (ref 11.5–14.5)
ERYTHROCYTE [DISTWIDTH] IN BLOOD BY AUTOMATED COUNT: 41.9 FL (ref 35–45)
GFR SERPL CREATININE-BSD FRML MDRD: 67 ML/MIN/1.73M2
GLUCOSE BLD-MCNC: 102 MG/DL (ref 70–108)
HCT VFR BLD CALC: 45.7 % (ref 42–52)
HEMOGLOBIN: 15.1 GM/DL (ref 14–18)
IMMATURE GRANS (ABS): 0.01 THOU/MM3 (ref 0–0.07)
IMMATURE GRANULOCYTES: 0.2 %
LYMPHOCYTES # BLD: 20 %
LYMPHOCYTES ABSOLUTE: 1.2 THOU/MM3 (ref 1–4.8)
MCH RBC QN AUTO: 31.1 PG (ref 26–33)
MCHC RBC AUTO-ENTMCNC: 33 GM/DL (ref 32.2–35.5)
MCV RBC AUTO: 94 FL (ref 80–94)
MONOCYTES # BLD: 7.6 %
MONOCYTES ABSOLUTE: 0.5 THOU/MM3 (ref 0.4–1.3)
NUCLEATED RED BLOOD CELLS: 0 /100 WBC
PLATELET # BLD: 183 THOU/MM3 (ref 130–400)
PMV BLD AUTO: 10.8 FL (ref 9.4–12.4)
POTASSIUM SERPL-SCNC: 4.4 MEQ/L (ref 3.5–5.2)
RBC # BLD: 4.86 MILL/MM3 (ref 4.7–6.1)
SEG NEUTROPHILS: 70.5 %
SEGMENTED NEUTROPHILS ABSOLUTE COUNT: 4.3 THOU/MM3 (ref 1.8–7.7)
SODIUM BLD-SCNC: 142 MEQ/L (ref 135–145)
WBC # BLD: 6.1 THOU/MM3 (ref 4.8–10.8)

## 2021-03-01 PROCEDURE — 71046 X-RAY EXAM CHEST 2 VIEWS: CPT

## 2021-03-01 PROCEDURE — 93005 ELECTROCARDIOGRAM TRACING: CPT

## 2021-03-01 PROCEDURE — 85025 COMPLETE CBC W/AUTO DIFF WBC: CPT

## 2021-03-01 PROCEDURE — 36415 COLL VENOUS BLD VENIPUNCTURE: CPT

## 2021-03-01 PROCEDURE — 80048 BASIC METABOLIC PNL TOTAL CA: CPT

## 2021-03-01 NOTE — PROGRESS NOTES
Mark Cardenas was seen today for pre-operative teaching for [facility]. He   was educated today on surgery procedure, possible complications, risks and benefits associated with LINX and instructed to remain NPO after midnight the night before surgery, or risk cancellation of surgical procedure. Importance of recognizing signs and symptoms of potential complication were discussed as well as when to contact the physician. We discussed the flow of events on the day of surgery. Discussed dysphagia in detail and that this is normal and expected after LINX and may last up to 3 months post-op- may require prescription steroid or dilation. Reinforced importance of eating and returning to a normal diet as soon as tolerated after surgery to ensure proper healing at the implant site. Also discussed the possibility of esophageal spasms, pooling, belching and hiccups and the best ways to manage these potential issues. Will schedule 1 week, 1 month, 3 month and 1 year post-op follow up visit.

## 2021-03-01 NOTE — PROGRESS NOTES
Nutrition Education Completed with patient today for planned LINX procedure. Education Included:  1. Written list of foods recommended and foods to avoid with sample menus and shopping list given to patient. 2.  Importance of small frequent meals -  minimum of 5-6 meals per day to aid in exercising the LINX  3. Avoid all carbonated beverages or taking large gulps/amounts of drinks at one time to minimize tightness or discomfort. May have improved tolerance of room temperature/warm fluids vs cold fluids  4. To begin soft foods the day of surgery to active the LINX right away. Soft foods will allow the LINX to open and close with the passing of food, and help to prevent scar tissue from forming. Advised NOT to consume only liquids or pureed foods. Patient will have one week, four weeks, three months and one year post op visit in [address] [place] [address] office with PA or MD.  May call dietitian number for any additional questions.      0 [facility], [address] [place] [address] [facility]

## 2021-03-05 ENCOUNTER — OFFICE VISIT (OUTPATIENT)
Dept: FAMILY MEDICINE CLINIC | Age: 66
End: 2021-03-05
Payer: MEDICARE

## 2021-03-05 VITALS
DIASTOLIC BLOOD PRESSURE: 70 MMHG | BODY MASS INDEX: 26.47 KG/M2 | OXYGEN SATURATION: 100 % | HEART RATE: 70 BPM | WEIGHT: 169 LBS | TEMPERATURE: 97.1 F | SYSTOLIC BLOOD PRESSURE: 102 MMHG

## 2021-03-05 DIAGNOSIS — Z91.81 AT HIGH RISK FOR FALLS: ICD-10-CM

## 2021-03-05 DIAGNOSIS — R73.01 IFG (IMPAIRED FASTING GLUCOSE): ICD-10-CM

## 2021-03-05 DIAGNOSIS — Z01.818 PREOP EXAMINATION: ICD-10-CM

## 2021-03-05 DIAGNOSIS — F43.21 ADJUSTMENT DISORDER WITH DEPRESSED MOOD: ICD-10-CM

## 2021-03-05 DIAGNOSIS — G47.09 OTHER INSOMNIA: ICD-10-CM

## 2021-03-05 DIAGNOSIS — K21.9 GASTROESOPHAGEAL REFLUX DISEASE, UNSPECIFIED WHETHER ESOPHAGITIS PRESENT: Primary | ICD-10-CM

## 2021-03-05 PROCEDURE — G8427 DOCREV CUR MEDS BY ELIG CLIN: HCPCS | Performed by: NURSE PRACTITIONER

## 2021-03-05 PROCEDURE — G8417 CALC BMI ABV UP PARAM F/U: HCPCS | Performed by: NURSE PRACTITIONER

## 2021-03-05 PROCEDURE — 4040F PNEUMOC VAC/ADMIN/RCVD: CPT | Performed by: NURSE PRACTITIONER

## 2021-03-05 PROCEDURE — 3017F COLORECTAL CA SCREEN DOC REV: CPT | Performed by: NURSE PRACTITIONER

## 2021-03-05 PROCEDURE — G8484 FLU IMMUNIZE NO ADMIN: HCPCS | Performed by: NURSE PRACTITIONER

## 2021-03-05 PROCEDURE — 99213 OFFICE O/P EST LOW 20 MIN: CPT | Performed by: NURSE PRACTITIONER

## 2021-03-05 PROCEDURE — 1123F ACP DISCUSS/DSCN MKR DOCD: CPT | Performed by: NURSE PRACTITIONER

## 2021-03-05 PROCEDURE — 1036F TOBACCO NON-USER: CPT | Performed by: NURSE PRACTITIONER

## 2021-03-05 RX ORDER — PHENOL 1.4 %
1 AEROSOL, SPRAY (ML) MUCOUS MEMBRANE NIGHTLY PRN
COMMUNITY
End: 2021-10-06

## 2021-03-05 RX ORDER — BUPROPION HYDROCHLORIDE 150 MG/1
TABLET ORAL
Qty: 90 TABLET | Refills: 0 | Status: SHIPPED | OUTPATIENT
Start: 2021-03-05 | End: 2021-04-05

## 2021-03-05 ASSESSMENT — ENCOUNTER SYMPTOMS
EYES NEGATIVE: 1
BLOOD IN STOOL: 0
CHEST TIGHTNESS: 0
SHORTNESS OF BREATH: 0
ABDOMINAL PAIN: 0

## 2021-03-05 ASSESSMENT — PATIENT HEALTH QUESTIONNAIRE - PHQ9
SUM OF ALL RESPONSES TO PHQ9 QUESTIONS 1 & 2: 0
SUM OF ALL RESPONSES TO PHQ QUESTIONS 1-9: 0
2. FEELING DOWN, DEPRESSED OR HOPELESS: 0

## 2021-03-05 NOTE — PATIENT INSTRUCTIONS
Patient Education        Learning About Linx for Gastroesophageal Reflux Disease (GERD)  What is the Linx device for GERD? The Linx device is a small ring of magnetic beads. It helps keep acid in your stomach. It's used to treat gastroesophageal reflux disease (GERD). GERD is the backward flow of stomach acid into the esophagus, the tube that carries food and liquid to your stomach. How can it help with GERD? The Linx device is placed around the base of the esophagus where it meets the stomach. The device helps keep the lower esophageal sphincter (LES) closed. That's the valve between your stomach and esophagus. It helps stop food from going back up into the esophagus. The device keeps the valve closed when you aren't eating. When you swallow, it allows the valve to open so food can pass through. This helps keep stomach acid out of the esophagus. Your doctor might suggest getting the device if other treatments haven't helped your symptoms. Some people with GERD may be helped by a device. Others may need different types of surgeries. How is it inserted? You will need a minor surgery to insert the Linx device. You will be asleep during the surgery. You will be kept comfortable and safe by your anesthesia provider. Your doctor will make a few small cuts (incisions) in your belly. The doctor will use special tools to insert the Linx device through one of the cuts and attach it around your lower esophagus. A thin, lighted tube, or scope, in another of the cuts will allow the doctor to see inside your belly. It takes about 30 minutes to an hour to place the device. You may be able to go home the same day or the next day. What are the side effects? After getting a Linx device, some people have trouble swallowing. This may last up to a few weeks. You may also have pain in the chest, vomiting, or nausea. What can you expect?

## 2021-03-05 NOTE — PROGRESS NOTES
[facility]  [address] [place] [address] Rd., [phone] [id number]  : [phone]  Dept Fax: [phone]    SUBJECTIVE       Chief Complaint   Patient presents with    Pre-op Exam     labs, EKG, and chest xray in chart. Pt presents for PRE-OP PE for planned Linx procedure. Surgery is scheduled for [facility] with Dr. Yumi Jimenez on 3/15/21. General anesthesia is planned. Current symptoms include heartburn. Previous therapy tried includes: Nexium, Dexilant, antacids    CURRENT EXERCISE REGIMEN: Not regularly    ANESTHESIA PROBLEMS? Trouble waking him up. Current medical problems include   Patient Active Problem List   Diagnosis    Hyperlipidemia    GERD (gastroesophageal reflux disease)    Chronic allergic rhinitis    Benign non-nodular prostatic hyperplasia with lower urinary tract symptoms    IFG (impaired fasting glucose)    Obstructive sleep apnea on CPAP    Insomnia    Erectile dysfunction    Adjustment disorder with depressed mood       Past Medical History:   Diagnosis Date    Allergic     Allergic rhinitis     Arthritis     Back pain     Depression     GERD (gastroesophageal reflux disease)     Hyperlipidemia     Prostatism     Unspecified sleep apnea        Past Surgical History:   Procedure Laterality Date    CARDIAC CATHETERIZATION      COLONOSCOPY  2011    ESOPHAGEAL PH MONITORING N/A 2/16/2021    ESOPHAGEAL PH MONITORING (NO EGD) performed by Barbara Grissom MD at [facility]    EYE SURGERY      OTHER SURGICAL HISTORY      tendonitus repair     RETINAL DETACHMENT SURGERY      UPPER GASTROINTESTINAL ENDOSCOPY N/A 1/15/2021    RANDALL performed by Pearl Cruz MD at [facility]    UPPER GASTROINTESTINAL ENDOSCOPY  1/15/2021    EGD BIOPSY performed by Pearl Cruz MD at [facility]       Allergies   Allergen Reactions    Seasonal      hayfever per pt.          Current Outpatient Medications:   buPROPion (WELLBUTRIN XL) 150 MG extended release tablet, TAKE 1 TABLET BY MOUTH IN THE MORNING , Disp: 90 tablet, Rfl: 0    famotidine (PEPCID) suspension, , Disp: , Rfl:     Melatonin 10 MG TABS, Take 1 tablet by mouth, Disp: , Rfl:     Eszopiclone (LUNESTA PO), Take by mouth, Disp: , Rfl:     lansoprazole (PREVACID SOLUTAB) 30 MG disintegrating tablet, Take 30 mg by mouth daily, Disp: , Rfl:     Calcium Carbonate Antacid 1000 MG CHEW, Take by mouth, Disp: , Rfl:     calcium carbonate 600 MG TABS tablet, Take 1 tablet by mouth daily, Disp: , Rfl:     Probiotic Product (PROBIOTIC PO), Take by mouth, Disp: , Rfl:     dexlansoprazole (DEXILANT) 60 MG CPDR delayed release capsule, Take 1 capsule by mouth every evening, Disp: , Rfl:     Plant Sterol Stanol-Pantethine (CHOLESTOFF COMPLETE) 300-100 MG CAPS, , Disp: , Rfl:     tadalafil (CIALIS) 5 MG tablet, Take 1 tablet by mouth as needed for Erectile Dysfunction, Disp: 30 tablet, Rfl: 0    UNABLE TO FIND, Domperidone- 10 mg 4x/day per Dr. Ruben Parrish for GERD., Disp: , Rfl:     esomeprazole Magnesium (NEXIUM) 40 MG PACK, Take 40 mg by mouth daily, Disp: , Rfl:     tamsulosin (FLOMAX) 0.4 MG capsule, Take 2 capsules by mouth daily, Disp: 180 capsule, Rfl: 3    atorvastatin (LIPITOR) 10 MG tablet, TAKE 1 TABLET BY MOUTH ONE TIME A DAY , Disp: 90 tablet, Rfl: 3    fluticasone (FLONASE) 50 MCG/ACT nasal spray, 2 sprays by Nasal route daily. , Disp: 3 Bottle, Rfl: 3    loratadine (CLARITIN) 10 MG tablet, Take 10 mg by mouth daily , Disp: , Rfl:     aspirin 81 MG EC tablet, Take 81 mg by mouth daily. , Disp: , Rfl:     fish oil-omega-3 fatty acids 1000 MG capsule, Take 1 g by mouth daily , Disp: , Rfl:     therapeutic multivitamin-minerals (THERAGRAN-M) tablet, Take 1 tablet by mouth daily.  occasionally, Disp: , Rfl:   traZODone (DESYREL) 50 MG tablet, TAKE 1/2-1 TABLET BY MOUTH AT BEDTIME (Patient not taking: Reported on 3/5/2021), Disp: 30 tablet, Rfl: 5        Family History   Problem Relation Age of Onset    Arthritis Mother     Hearing Loss Mother     High Blood Pressure Mother     High Cholesterol Mother     Stroke Mother     Cancer Mother     Depression Father     Dementia Father     Arthritis Father     Diabetes Father     Birth Defects Sister     Diabetes Paternal Grandfather     High Blood Pressure Brother     Arthritis Brother     Other Brother         heart palpatations    Cancer Maternal Grandfather     Cancer Maternal Grandmother        Social History     Tobacco Use    Smoking status: Never Smoker    Smokeless tobacco: Never Used   Substance Use Topics    Alcohol use: Yes     Types: 1 Shots of liquor per week     Comment: rare    Drug use: No       Review of Systems   Constitutional: Negative for chills, fatigue, fever and unexpected weight change. HENT: Negative. Eyes: Negative. Respiratory: Negative for chest tightness and shortness of breath. Cardiovascular: Negative for chest pain, palpitations and leg swelling. Gastrointestinal: Negative for abdominal pain and blood in stool. Genitourinary: Negative for dysuria. Musculoskeletal: Negative for joint swelling. Skin: Negative for rash. Neurological: Negative for dizziness. Psychiatric/Behavioral: Negative. All other systems reviewed and are negative. OBJECTIVE     /70 (Site: Right Upper Arm)   Pulse 70   Temp 97.1 °F (36.2 °C) (Temporal)   Wt 169 lb (76.7 kg)   SpO2 100%   BMI 26.47 kg/m²     Wt Readings from Last 3 Encounters:   03/05/21 169 lb (76.7 kg)   02/26/21 169 lb 9.6 oz (76.9 kg)   02/16/21 166 lb (75.3 kg)       Physical Exam  Vitals signs and nursing note reviewed. Constitutional:       Appearance: He is well-developed. HENT:      Head: Normocephalic and atraumatic. Right Ear: External ear normal.      Left Ear: External ear normal.      Nose: Nose normal.   Eyes:      Conjunctiva/sclera: Conjunctivae normal.      Pupils: Pupils are equal, round, and reactive to light. Neck:      Musculoskeletal: Normal range of motion and neck supple. Cardiovascular:      Rate and Rhythm: Normal rate and regular rhythm. Pulmonary:      Effort: Pulmonary effort is normal.      Breath sounds: Normal breath sounds. Abdominal:      General: Bowel sounds are normal.      Palpations: Abdomen is soft. Musculoskeletal: Normal range of motion. Skin:     General: Skin is warm and dry. Neurological:      Mental Status: He is alert and oriented to person, place, and time. Deep Tendon Reflexes: Reflexes are normal and symmetric. Psychiatric:         Behavior: Behavior normal.         Thought Content:  Thought content normal.         Judgment: Judgment normal.         Component      Latest Ref Rng & Units 3/1/2021   WBC      4.8 - 10.8 thou/mm3 6.1   RBC      4.70 - 6.10 mill/mm3 4.86   Hemoglobin Quant      14.0 - 18.0 gm/dl 15.1   Hematocrit      42.0 - 52.0 % 45.7   MCV      80.0 - 94.0 fL 94.0   MCH      26.0 - 33.0 pg 31.1   MCHC      32.2 - 35.5 gm/dl 33.0   RDW-CV      11.5 - 14.5 % 12.1   RDW-SD      35.0 - 45.0 fL 41.9   Platelet Count      516 - 400 thou/mm3 183   MPV      9.4 - 12.4 fL 10.8   Seg Neutrophils      % 70.5   Lymphocytes      % 20.0   Monocytes      % 7.6   Eosinophils      % 1.2   Basophils      % 0.5   Immature Granulocytes      % 0.2   Segs Absolute      1.8 - 7.7 thou/mm3 4.3   Lymphocytes Absolute      1.0 - 4.8 thou/mm3 1.2   Monocytes Absolute      0.4 - 1.3 thou/mm3 0.5   Eosinophils Absolute      0.0 - 0.4 thou/mm3 0.1   Basophils Absolute      0.0 - 0.1 thou/mm3 0.0   Immature Grans (Abs)      0.00 - 0.07 thou/mm3 0.01   Nucleated Red Blood Cells      /100 wbc 0   Sodium      135 - 145 meq/L 142   Potassium      3.5 - 5.2 meq/L 4.4   Chloride 98 - 111 meq/L 103   CO2      23 - 33 meq/L 32   Glucose      70 - 108 mg/dL 102   BUN      7 - 22 mg/dL 18   Creatinine      0.4 - 1.2 mg/dL 1.1   Calcium      8.5 - 10.5 mg/dL 9.5   Anion Gap      8.0 - 16.0 meq/L 7.0 (L)   Est, Glom Filt Rate      ml/min/1.73m2 67 (A)     Narrative   PROCEDURE: XR CHEST (2 VW)       CLINICAL INFORMATION: 39-year-old male presents for a preoperative exam. History of gastroesophageal reflux disease. .       COMPARISON: No prior study.       TECHNIQUE: PA and lateral views of the chest were obtained.       FINDINGS:    The lungs are clear.        The cardiac silhouette and pulmonary vasculature are within normal limits.       There is no significant pleural effusion or pneumothorax.       Visualized portions of the upper abdomen are within normal limits.       The osseous structures are intact. No acute fractures or suspicious osseous lesions.           Impression   There is no acute intrathoracic process.                   **This report has been created using voice recognition software. It may contain minor errors which are inherent in voice recognition technology. **       Final report electronically signed by Dr Joshua Laura on 3/1/2021 11:37 AM     3/1/2021 10:58 AM - Chris, [facility] Incoming Scans    Component Value Ref Range & Units Status Collected Lab   Ventricular Rate 59  BPM Final 03/01/2021 10:17    Atrial Rate 59  BPM Final 03/01/2021 10:17    P-R Interval 170  ms Final 03/01/2021 10:17    QRS Duration 88  ms Final 03/01/2021 10:17    Q-T Interval 424  ms Final 03/01/2021 10:17    QTc Calculation (Bazett) 419  ms Final 03/01/2021 10:17    P Axis 70  degrees Final 03/01/2021 10:17    R Axis -29  degrees Final 03/01/2021 10:17    T Axis 48  degrees Final 03/01/2021 10:17    Testing Performed By    Lab - [address] Name Director Address Valid Date Range 184-[facility] Unknown 05/03/12 1018-Present   Narrative & Impression    Sinus bradycardia  Otherwise normal ECG  When compared with ECG of 12-DEC-2019 16:49,  Premature atrial complexes are no longer Present  Nonspecific T wave abnormality no longer evident in Anterior leads  Confirmed by PRAVIN GILLETTE ([id number]) on 3/1/2021 10:58:24 AM         Immunization History   Administered Date(s) Administered    COVID-19, Moderna, PF, 100mcg/0.5mL 02/23/2021    Pneumococcal Polysaccharide ([id number]) 08/11/2020    Tdap (Boostrix, Adacel) 07/25/2013    Zoster Live (Zostavax) 01/25/2013    Zoster Recombinant (Shingrix) 07/09/2019, 10/14/2019           ASSESSMENT       Diagnosis Orders   1. Gastroesophageal reflux disease, unspecified whether esophagitis present     2. At high risk for falls     3. Preop examination     4. IFG (impaired fasting glucose)     5. Other insomnia         PLAN     OK FOR OR FROM MY STANDPOINT- NO ACTIVE CONTRAINDICATIONS AT THIS TIME  BRANDO-OPERATIVE MEDICATION INSTRUCTIONS ALREADY PROVIDED TO PT BY SURGEON'S OFFICE. Continue current medications otherwise. Follow up as previously scheduled             Electronically signed by ROSMERY Carnes CNP on 3/5/2021 at 3:50 PM      On the basis of positive falls risk screening, assessment and plan is as follows: Only activity related falls. Pt denies any issues or needs. Brian Carreon

## 2021-03-05 NOTE — TELEPHONE ENCOUNTER
This medication refill is regarding a electronic request by [facility]. Requested Prescriptions     Pending Prescriptions Disp Refills    buPROPion (WELLBUTRIN XL) 150 MG extended release tablet [Pharmacy Med Name: buPROPion HCl ER (XL) Oral Tablet Extended Release 24 Hour 150 MG] 90 tablet 0     Sig: TAKE 1 TABLET BY MOUTH IN THE MORNING       Date of last visit: 8/11/2020  Date of next visit: 3/5/2021  Date of last refill: 2/17/2020  90/3    Rx verified, ordered and set to EP.

## 2021-03-09 ENCOUNTER — HOSPITAL ENCOUNTER (OUTPATIENT)
Age: 66
Setting detail: SPECIMEN
Discharge: HOME | End: 2021-03-09
Payer: MEDICARE

## 2021-03-09 PROCEDURE — U0003 INFECTIOUS AGENT DETECTION BY NUCLEIC ACID (DNA OR RNA); SEVERE ACUTE RESPIRATORY SYNDROME CORONAVIRUS 2 (SARS-COV-2) (CORONAVIRUS DISEASE [COVID-19]), AMPLIFIED PROBE TECHNIQUE, MAKING USE OF HIGH THROUGHPUT TECHNOLOGIES AS DESCRIBED BY CMS-2020-01-R: HCPCS

## 2021-03-09 RX ORDER — TRAZODONE HYDROCHLORIDE 50 MG/1
TABLET ORAL NIGHTLY PRN
Status: ON HOLD | COMMUNITY
End: 2021-03-15

## 2021-03-11 LAB — SARS-COV-2: NOT DETECTED

## 2021-03-14 NOTE — H&P
Cecelia Re (: 1955)   ASSESSMENT:   1. Chronic progressive gastroesophageal reflux disease   2. Wiseman's esophagus   PLAN:   1. Schedule Rajendra for robotic magnetic sphincter augmentation (LINX) insertion; hiatal hernia repair if identified intraoperatively. 2. He will undergo pre-operative clearance per anesthesia guidelines with risk factors listed under the past medical history diagnosis & problem list.   3. The risks, benefits and alternatives were discussed with Connor Padilla including non-operative management. The pros and cons of robotic, laparoscopic and open techniques were discussed. The pros and cons of mesh insertion were discussed. The pros and cons of magnetic sphincter augmentation and fundoplication discussed in detail. All questions answered. He understands and wishes to proceed with surgical intervention. 4. Restrictions discussed with Connor Padilla and he expresses understanding. 5. He is advised to call back directly if there are further questions/concerns, or if his symptoms worsen prior to surgery. 6. Reviewed 24-hour pH probe study results. 7. Dietary and lifestyle modification/restrictions discussed with patient in regards to GERD/heartburn. 8. Continue antacid/PPI as directed. Currently on Nexium and Dexilant. 9. Nutritional education occurred today. SUBJECTIVE/OBJECTIVE:        Chief Complaint   Patient presents with    Follow-up     f/u test results      HPI   Connor Padilla is a 65-year-old male who presents for follow-up due to medical refractory gastroesophageal reflux disease associated with Wiseman's esophagus. No dysplasia. Underwent 24-hour pH probe. Had a significantly elevated DeMeester score above 200. Has classic signs and symptoms of significant GERD/heartburn. He states his symptoms have remained the same. Much worse when he had come off of the PPI medications for the test. Still sleeping in a recliner with marginal success. Daily symptoms.  Has maximized dietary and lifestyle modifications with marginal success. Denies current chest pain. No abdominal pain. Chronic cough. Chronic back pain. Chronic urgency/frequency. No hematochezia or melena. Wishing to proceed with surgery if possible in hopes to improve his GERD. Review of Systems   Constitutional: Negative for activity change, appetite change, chills, diaphoresis, fatigue, fever and unexpected weight change. HENT: Negative for congestion, dental problem, drooling, ear discharge, ear pain, facial swelling, hearing loss, mouth sores, nosebleeds, postnasal drip, rhinorrhea, sinus pressure, sneezing, sore throat, tinnitus, trouble swallowing and voice change. Eyes: Negative for photophobia, pain, discharge, redness, itching and visual disturbance. Respiratory: Positive for cough. Negative for apnea, choking, chest tightness, shortness of breath, wheezing and stridor. Cardiovascular: Negative for chest pain, palpitations and leg swelling. Gastrointestinal: Negative for abdominal distention, abdominal pain, anal bleeding, blood in stool, constipation, diarrhea, nausea, rectal pain and vomiting. Endocrine: Negative. Genitourinary: Positive for frequency and urgency. Negative for decreased urine volume, difficulty urinating, discharge, dysuria, enuresis, flank pain, genital sores, hematuria, penile pain, penile swelling, scrotal swelling and testicular pain. Musculoskeletal: Positive for back pain. Negative for arthralgias, gait problem, joint swelling, myalgias, neck pain and neck stiffness. Skin: Negative for color change, pallor, rash and wound. Allergic/Immunologic: Negative. Neurological: Negative for dizziness, tremors, seizures, syncope, facial asymmetry, speech difficulty, weakness, light-headedness, numbness and headaches. Hematological: Negative for adenopathy. Does not bruise/bleed easily.    Psychiatric/Behavioral: Negative for agitation, behavioral problems, confusion, decreased concentration, dysphoric mood, hallucinations, self-injury, sleep disturbance and suicidal ideas. The patient is not nervous/anxious and is not hyperactive.      Past Medical History        Past Medical History:   Diagnosis Date    Allergic     Allergic rhinitis     Arthritis     Back pain     Depression     GERD (gastroesophageal reflux disease)     Hyperlipidemia     Prostatism     Unspecified sleep apnea      Past Surgical History         Past Surgical History:   Procedure Laterality Date    CARDIAC CATHETERIZATION      COLONOSCOPY  2011    ESOPHAGEAL PH MONITORING N/A 2/16/2021    ESOPHAGEAL PH MONITORING (NO EGD) performed by Enrique Weiner MD at [place] [address] OTHER SURGICAL HISTORY      tendonitus repair     RETINAL DETACHMENT SURGERY      UPPER GASTROINTESTINAL ENDOSCOPY N/A 1/15/2021    BRAVO performed by Negrita Suazo MD at [facility]   [facility]    EGD BIOPSY performed by Negrita Suazo MD at [facility]     Current Facility-Administered Medications          Current Outpatient Medications   Medication Sig Dispense Refill    fluorouracil (EFUDEX) 5 % cream apply to the affected area twice daily for 2 to 3 week treatment cycles as outlined at the office      Eszopiclone (LUNESTA PO) Take by mouth      lansoprazole (PREVACID SOLUTAB) 30 MG disintegrating tablet Take 30 mg by mouth daily      Calcium Carbonate Antacid 1000 MG CHEW Take by mouth      calcium carbonate 600 MG TABS tablet Take 1 tablet by mouth daily      Probiotic Product (PROBIOTIC PO) Take by mouth      dexlansoprazole (DEXILANT) 60 MG CPDR delayed release capsule Take 1 capsule by mouth every evening      Plant Sterol Stanol-Pantethine (CHOLESTOFF COMPLETE) 300-100 MG CAPS       tadalafil (CIALIS) 5 MG tablet Take 1 tablet by mouth as needed for Erectile Dysfunction 30 tablet 0    esomeprazole Magnesium (NEXIUM) 40 MG PACK Take 40 mg by mouth daily      tamsulosin (FLOMAX) 0.4 MG capsule Take 2 capsules by mouth daily 180 capsule 3    atorvastatin (LIPITOR) 10 MG tablet TAKE 1 TABLET BY MOUTH ONE TIME A DAY  90 tablet 3    buPROPion (WELLBUTRIN XL) 150 MG extended release tablet TAKE 1 TABLET BY MOUTH IN THE MORNING  90 tablet 3    fluticasone (FLONASE) 50 MCG/ACT nasal spray 2 sprays by Nasal route daily. 3 Bottle 3    loratadine (CLARITIN) 10 MG tablet Take 10 mg by mouth daily       aspirin 81 MG EC tablet Take 81 mg by mouth daily.  fish oil-omega-3 fatty acids 1000 MG capsule Take 1 g by mouth daily       therapeutic multivitamin-minerals (THERAGRAN-M) tablet Take 1 tablet by mouth daily. occasionally      UNABLE TO FIND Domperidone- 10 mg 4x/day per Dr. La Danielle for GERD.  traZODone (DESYREL) 50 MG tablet TAKE 1/2-1 TABLET BY MOUTH AT BEDTIME (Patient not taking: Reported on 2/26/2021) 30 tablet 5   No current facility-administered medications for this visit. Allergies   Allergen Reactions    Seasonal      hayfever per pt.      Family History         Family History   Problem Relation Age of Onset    Arthritis Mother     Hearing Loss Mother     High Blood Pressure Mother     High Cholesterol Mother     Stroke Mother     Cancer Mother     Depression Father     Dementia Father     Arthritis Father     Diabetes Father     Birth Defects Sister     Diabetes Paternal Grandfather     High Blood Pressure Brother     Arthritis Brother     Other Brother     heart palpatations    Cancer Maternal Grandfather     Cancer Maternal Grandmother      Social History         Socioeconomic History    Marital status:      Spouse name: Not on file    Number of children: Not on file    Years of education: Not on file    Highest education level: Not on file   Occupational History    Not on file   Social Needs    Financial resource strain: Not on file    Food insecurity     Worry: Not on file     Inability: Not on file   [employer] needs Medical: Not on file     Non-medical: Not on file   Tobacco Use    Smoking status: Never Smoker    Smokeless tobacco: Never Used   Substance and Sexual Activity    Alcohol use: Yes     Types: 1 Shots of liquor per week     Comment: rare    Drug use: No    Sexual activity: Yes     Partners: Female   Lifestyle    Physical activity     Days per week: Not on file     Minutes per session: Not on file    Stress: Not on file   Relationships    Social connections     Talks on phone: Not on file     Gets together: Not on file     Attends Evangelical service: Not on file     Active member of club or organization: Not on file     Attends meetings of clubs or organizations: Not on file     Relationship status: Not on file    Intimate partner violence     Fear of current or ex partner: Not on file     Emotionally abused: Not on file     Physically abused: Not on file     Forced sexual activity: Not on file   Other Topics Concern    Not on file   Social History Narrative    Not on file     Vitals       Vitals:    02/26/21 1418   BP: 102/60   Site: Left Upper Arm   Position: Sitting   Cuff Size: Large Adult   Pulse: 72   Resp: 18   Temp: 97.7 °F (36.5 °C)   TempSrc: Infrared   Weight: 169 lb 9.6 oz (76.9 kg)   Height: 5' 7\" (1.702 m)   Body mass index is 26.56 kg/m². Wt Readings from Last 3 Encounters:   02/26/21 169 lb 9.6 oz (76.9 kg)   02/16/21 166 lb (75.3 kg)   01/29/21 168 lb 6.4 oz (76.4 kg)     Physical Exam   Vitals signs reviewed. Constitutional:   General: He is not in acute distress. Appearance: He is well-developed. He is not diaphoretic. HENT:   Head: Normocephalic and atraumatic. Right Ear: External ear normal.   Left Ear: External ear normal.   Nose: Nose normal.   Eyes:   General: No scleral icterus. Right eye: No discharge. Left eye: No discharge. Conjunctiva/sclera: Conjunctivae normal.   Neck:   Musculoskeletal: Normal range of motion and neck supple.    Cardiovascular:   Rate and Rhythm: Normal rate and regular rhythm. Heart sounds: Normal heart sounds. Pulmonary:   Effort: Pulmonary effort is normal. No respiratory distress. Breath sounds: Normal breath sounds. No wheezing or rales. Chest:   Chest wall: No tenderness. Abdominal:   General: Bowel sounds are normal. There is no distension. Palpations: Abdomen is soft. There is no mass. Tenderness: There is no abdominal tenderness. There is no guarding or rebound. Musculoskeletal: Normal range of motion. General: No tenderness. Skin:   General: Skin is warm and dry. Coloration: Skin is not pale. Findings: No erythema or rash. Neurological:   Mental Status: He is alert and oriented to person, place, and time. Cranial Nerves: No cranial nerve deficit. Psychiatric:   Behavior: Behavior normal.   Thought Content:  Thought content normal.   Judgment: Judgment normal.           Lab Results   Component Value Date     08/03/2020    K 4.1 08/03/2020     08/03/2020    CO2 31 08/03/2020           Lab Results   Component Value Date    CREATININE 1.17 08/03/2020           Lab Results   Component Value Date    WBC 6.8 12/12/2019    HGB 14.7 12/12/2019    HCT 45.1 12/12/2019    MCV 95.1 (H) 12/12/2019     12/12/2019     Impedance-pH Monitoring Report     Patient Data Clinician Data   Patient Name: Rsoette Hu   Patient ID: [id number] Physician: DR. Penny Mae   Patient Sex: Male Referring Physician: Loren Codding   Date of Birth: [id number] Performed by: Endo Nurses   Date of Admission: 2021-02-16   Visit ID:     Procedure Data   Procedure Start: 2021-02-16 08:43:24   Procedure Duration: 24:32:12   Catheter Depth: N/A     Reflux Analysis Settings   Analysis Duration Upright: 15:45:59   Analysis Duration Recumbent: 07:03:23   Analysis Duration Total: 22:49:23     Patient History   Symptom(s):Burning, Acid, Reflux     Medications:Nexium 40 mg 1xdaily & Dexilant 60 mg 1x daily     Impressions   Acid exposure indicates - acid reflux   Deemeester score 212.5 indicates - acid reflux   Impedence indicates - acid reflux   Symptom Index (SI) - correlates with acid reflux   Symptom Association Probability (SAP) - NO correlates with acid   reflux     Exam consistent with: ACID REFLUX     Recommendations   Consider increasing PPI or adding bethanechol or baclofen   Fundoplication is also an option       Samira De La Cruz MD   2:44 PM 2/19/2021   Imaging -         Narrative   PROCEDURE: FL ESOPHAGRAM      CLINICAL INFORMATION: Gastroesophageal reflux disease with esophagitis without hemorrhage . COMPARISON: No prior study. TECHNIQUE: Double contrast examination with thin and thick barium, gas crystals, and solid food. Fluoroscopy time: 1.5 minutes   Images: 7   FINDINGS: The esophagus is normal in course and caliber. There is no difficulty in swallowing. No aspiration was visualized. Piriform fossa and vallecula are well outlined no masses identified. There is moderate tertiary peristaltic contractions. There is no hiatal hernia identified. No evidence of ulcers or erosions. The patient cleared the bagal, and marshmallow with 2-3 stripping waves         Impression   Mild to moderate tertiary peristaltic contractions. Clearance of the solid food with 2-3 stripping waves. No hiatal hernia            **This report has been created using voice recognition software. It may contain minor errors which are inherent in voice recognition technology. **      Final report electronically signed by Dr. Abimael De La Fuente on 12/7/2020 8:50 AM         Patient Active Problem List   Diagnosis    Hyperlipidemia    GERD (gastroesophageal reflux disease)    Chronic allergic rhinitis    Benign non-nodular prostatic hyperplasia with lower urinary tract symptoms    IFG (impaired fasting glucose)    Obstructive sleep apnea on CPAP    Insomnia    Erectile dysfunction    Adjustment disorder with depressed mood     An electronic

## 2021-03-15 ENCOUNTER — ANESTHESIA EVENT (OUTPATIENT)
Dept: OPERATING ROOM | Age: 66
End: 2021-03-15
Payer: MEDICARE

## 2021-03-15 ENCOUNTER — ANESTHESIA (OUTPATIENT)
Dept: OPERATING ROOM | Age: 66
End: 2021-03-15
Payer: MEDICARE

## 2021-03-15 ENCOUNTER — HOSPITAL ENCOUNTER (OUTPATIENT)
Age: 66
Setting detail: OUTPATIENT SURGERY
Discharge: HOME | End: 2021-03-15
Attending: SURGERY | Admitting: SURGERY
Payer: MEDICARE

## 2021-03-15 VITALS
BODY MASS INDEX: 25.46 KG/M2 | TEMPERATURE: 96.1 F | DIASTOLIC BLOOD PRESSURE: 73 MMHG | RESPIRATION RATE: 18 BRPM | SYSTOLIC BLOOD PRESSURE: 124 MMHG | HEART RATE: 73 BPM | OXYGEN SATURATION: 92 % | WEIGHT: 168 LBS | HEIGHT: 68 IN

## 2021-03-15 VITALS — TEMPERATURE: 73 F | SYSTOLIC BLOOD PRESSURE: 138 MMHG | DIASTOLIC BLOOD PRESSURE: 83 MMHG | OXYGEN SATURATION: 98 %

## 2021-03-15 DIAGNOSIS — K21.9 GASTROESOPHAGEAL REFLUX DISEASE, UNSPECIFIED WHETHER ESOPHAGITIS PRESENT: ICD-10-CM

## 2021-03-15 DIAGNOSIS — K44.9 HIATAL HERNIA: Primary | ICD-10-CM

## 2021-03-15 PROCEDURE — 2580000003 HC RX 258: Performed by: SURGERY

## 2021-03-15 PROCEDURE — 3600000019 HC SURGERY ROBOT ADDTL 15MIN: Performed by: SURGERY

## 2021-03-15 PROCEDURE — L8699 PROSTHETIC IMPLANT NOS: HCPCS | Performed by: SURGERY

## 2021-03-15 PROCEDURE — 6360000002 HC RX W HCPCS: Performed by: ANESTHESIOLOGY

## 2021-03-15 PROCEDURE — 6360000002 HC RX W HCPCS: Performed by: SURGERY

## 2021-03-15 PROCEDURE — 49585 REPAIR UMBILICAL HERN,5+Y/O,REDUC: CPT | Performed by: SURGERY

## 2021-03-15 PROCEDURE — 3600000009 HC SURGERY ROBOT BASE: Performed by: SURGERY

## 2021-03-15 PROCEDURE — 2500000003 HC RX 250 WO HCPCS: Performed by: NURSE ANESTHETIST, CERTIFIED REGISTERED

## 2021-03-15 PROCEDURE — 2780000010 HC IMPLANT OTHER: Performed by: SURGERY

## 2021-03-15 PROCEDURE — 3700000001 HC ADD 15 MINUTES (ANESTHESIA): Performed by: SURGERY

## 2021-03-15 PROCEDURE — 3700000000 HC ANESTHESIA ATTENDED CARE: Performed by: SURGERY

## 2021-03-15 PROCEDURE — 43281 LAP PARAESOPHAG HERN REPAIR: CPT | Performed by: SURGERY

## 2021-03-15 PROCEDURE — 7100000000 HC PACU RECOVERY - FIRST 15 MIN: Performed by: SURGERY

## 2021-03-15 PROCEDURE — 2720000010 HC SURG SUPPLY STERILE: Performed by: SURGERY

## 2021-03-15 PROCEDURE — 2709999900 HC NON-CHARGEABLE SUPPLY: Performed by: SURGERY

## 2021-03-15 PROCEDURE — 6360000002 HC RX W HCPCS: Performed by: NURSE ANESTHETIST, CERTIFIED REGISTERED

## 2021-03-15 PROCEDURE — 7100000011 HC PHASE II RECOVERY - ADDTL 15 MIN: Performed by: SURGERY

## 2021-03-15 PROCEDURE — 6370000000 HC RX 637 (ALT 250 FOR IP): Performed by: SURGERY

## 2021-03-15 PROCEDURE — S2900 ROBOTIC SURGICAL SYSTEM: HCPCS | Performed by: SURGERY

## 2021-03-15 PROCEDURE — 2500000003 HC RX 250 WO HCPCS: Performed by: SURGERY

## 2021-03-15 PROCEDURE — 7100000001 HC PACU RECOVERY - ADDTL 15 MIN: Performed by: SURGERY

## 2021-03-15 PROCEDURE — 2580000003 HC RX 258: Performed by: NURSE ANESTHETIST, CERTIFIED REGISTERED

## 2021-03-15 PROCEDURE — 7100000010 HC PHASE II RECOVERY - FIRST 15 MIN: Performed by: SURGERY

## 2021-03-15 DEVICE — IMPLANT ANTIREFLX SZ 14 LAPSCP FUNDIC SPRNG MRI COMPATIBLE: Type: IMPLANTABLE DEVICE | Site: ABDOMEN | Status: FUNCTIONAL

## 2021-03-15 RX ORDER — MEPERIDINE HYDROCHLORIDE 25 MG/ML
12.5 INJECTION INTRAMUSCULAR; INTRAVENOUS; SUBCUTANEOUS EVERY 5 MIN PRN
Status: DISCONTINUED | OUTPATIENT
Start: 2021-03-15 | End: 2021-03-15 | Stop reason: HOSPADM

## 2021-03-15 RX ORDER — SODIUM CHLORIDE 0.9 % (FLUSH) 0.9 %
10 SYRINGE (ML) INJECTION PRN
Status: DISCONTINUED | OUTPATIENT
Start: 2021-03-15 | End: 2021-03-15 | Stop reason: HOSPADM

## 2021-03-15 RX ORDER — SODIUM CHLORIDE 0.9 % (FLUSH) 0.9 %
10 SYRINGE (ML) INJECTION EVERY 12 HOURS SCHEDULED
Status: DISCONTINUED | OUTPATIENT
Start: 2021-03-15 | End: 2021-03-15 | Stop reason: HOSPADM

## 2021-03-15 RX ORDER — FENTANYL CITRATE 50 UG/ML
INJECTION, SOLUTION INTRAMUSCULAR; INTRAVENOUS PRN
Status: DISCONTINUED | OUTPATIENT
Start: 2021-03-15 | End: 2021-03-15 | Stop reason: SDUPTHER

## 2021-03-15 RX ORDER — PROPOFOL 10 MG/ML
INJECTION, EMULSION INTRAVENOUS PRN
Status: DISCONTINUED | OUTPATIENT
Start: 2021-03-15 | End: 2021-03-15 | Stop reason: SDUPTHER

## 2021-03-15 RX ORDER — MORPHINE SULFATE 2 MG/ML
2 INJECTION, SOLUTION INTRAMUSCULAR; INTRAVENOUS
Status: DISCONTINUED | OUTPATIENT
Start: 2021-03-15 | End: 2021-03-15 | Stop reason: HOSPADM

## 2021-03-15 RX ORDER — PROMETHAZINE HYDROCHLORIDE 25 MG/1
12.5 TABLET ORAL EVERY 6 HOURS PRN
Status: DISCONTINUED | OUTPATIENT
Start: 2021-03-15 | End: 2021-03-15 | Stop reason: HOSPADM

## 2021-03-15 RX ORDER — ONDANSETRON 2 MG/ML
INJECTION INTRAMUSCULAR; INTRAVENOUS PRN
Status: DISCONTINUED | OUTPATIENT
Start: 2021-03-15 | End: 2021-03-15 | Stop reason: SDUPTHER

## 2021-03-15 RX ORDER — HYDROCODONE BITARTRATE AND ACETAMINOPHEN 5; 325 MG/1; MG/1
1-2 TABLET ORAL EVERY 6 HOURS PRN
Qty: 20 TABLET | Refills: 0 | Status: SHIPPED | OUTPATIENT
Start: 2021-03-15 | End: 2021-03-18

## 2021-03-15 RX ORDER — PROMETHAZINE HYDROCHLORIDE 25 MG/ML
12.5 INJECTION, SOLUTION INTRAMUSCULAR; INTRAVENOUS
Status: DISCONTINUED | OUTPATIENT
Start: 2021-03-15 | End: 2021-03-15 | Stop reason: HOSPADM

## 2021-03-15 RX ORDER — SUCCINYLCHOLINE CHLORIDE 20 MG/ML
INJECTION INTRAMUSCULAR; INTRAVENOUS PRN
Status: DISCONTINUED | OUTPATIENT
Start: 2021-03-15 | End: 2021-03-15 | Stop reason: SDUPTHER

## 2021-03-15 RX ORDER — DEXAMETHASONE SODIUM PHOSPHATE 10 MG/ML
INJECTION, EMULSION INTRAMUSCULAR; INTRAVENOUS PRN
Status: DISCONTINUED | OUTPATIENT
Start: 2021-03-15 | End: 2021-03-15 | Stop reason: SDUPTHER

## 2021-03-15 RX ORDER — LABETALOL 20 MG/4 ML (5 MG/ML) INTRAVENOUS SYRINGE
5 EVERY 10 MIN PRN
Status: DISCONTINUED | OUTPATIENT
Start: 2021-03-15 | End: 2021-03-15 | Stop reason: HOSPADM

## 2021-03-15 RX ORDER — OXYCODONE HYDROCHLORIDE 5 MG/1
10 TABLET ORAL EVERY 4 HOURS PRN
Status: DISCONTINUED | OUTPATIENT
Start: 2021-03-15 | End: 2021-03-15 | Stop reason: HOSPADM

## 2021-03-15 RX ORDER — SODIUM CHLORIDE 9 MG/ML
INJECTION, SOLUTION INTRAVENOUS CONTINUOUS
Status: DISCONTINUED | OUTPATIENT
Start: 2021-03-15 | End: 2021-03-15 | Stop reason: HOSPADM

## 2021-03-15 RX ORDER — SODIUM CHLORIDE 9 MG/ML
INJECTION, SOLUTION INTRAVENOUS CONTINUOUS PRN
Status: DISCONTINUED | OUTPATIENT
Start: 2021-03-15 | End: 2021-03-15 | Stop reason: SDUPTHER

## 2021-03-15 RX ORDER — FENTANYL CITRATE 50 UG/ML
25 INJECTION, SOLUTION INTRAMUSCULAR; INTRAVENOUS EVERY 5 MIN PRN
Status: DISCONTINUED | OUTPATIENT
Start: 2021-03-15 | End: 2021-03-15 | Stop reason: HOSPADM

## 2021-03-15 RX ORDER — MORPHINE SULFATE 2 MG/ML
4 INJECTION, SOLUTION INTRAMUSCULAR; INTRAVENOUS
Status: DISCONTINUED | OUTPATIENT
Start: 2021-03-15 | End: 2021-03-15 | Stop reason: HOSPADM

## 2021-03-15 RX ORDER — LIDOCAINE HYDROCHLORIDE 20 MG/ML
INJECTION, SOLUTION EPIDURAL; INFILTRATION; INTRACAUDAL; PERINEURAL PRN
Status: DISCONTINUED | OUTPATIENT
Start: 2021-03-15 | End: 2021-03-15 | Stop reason: SDUPTHER

## 2021-03-15 RX ORDER — FENTANYL CITRATE 50 UG/ML
50 INJECTION, SOLUTION INTRAMUSCULAR; INTRAVENOUS EVERY 5 MIN PRN
Status: COMPLETED | OUTPATIENT
Start: 2021-03-15 | End: 2021-03-15

## 2021-03-15 RX ORDER — ACETAMINOPHEN 325 MG/1
650 TABLET ORAL ONCE
Status: COMPLETED | OUTPATIENT
Start: 2021-03-15 | End: 2021-03-15

## 2021-03-15 RX ORDER — KETOROLAC TROMETHAMINE 10 MG/1
10 TABLET, FILM COATED ORAL EVERY 12 HOURS PRN
Qty: 10 TABLET | Refills: 0 | Status: SHIPPED | OUTPATIENT
Start: 2021-03-15 | End: 2021-04-07 | Stop reason: ALTCHOICE

## 2021-03-15 RX ORDER — ONDANSETRON 2 MG/ML
4 INJECTION INTRAMUSCULAR; INTRAVENOUS EVERY 6 HOURS PRN
Status: DISCONTINUED | OUTPATIENT
Start: 2021-03-15 | End: 2021-03-15 | Stop reason: HOSPADM

## 2021-03-15 RX ORDER — OXYCODONE HYDROCHLORIDE 5 MG/1
5 TABLET ORAL EVERY 4 HOURS PRN
Status: DISCONTINUED | OUTPATIENT
Start: 2021-03-15 | End: 2021-03-15 | Stop reason: HOSPADM

## 2021-03-15 RX ORDER — ROCURONIUM BROMIDE 10 MG/ML
INJECTION, SOLUTION INTRAVENOUS PRN
Status: DISCONTINUED | OUTPATIENT
Start: 2021-03-15 | End: 2021-03-15 | Stop reason: SDUPTHER

## 2021-03-15 RX ORDER — BUPIVACAINE HYDROCHLORIDE 5 MG/ML
INJECTION, SOLUTION EPIDURAL; INTRACAUDAL PRN
Status: DISCONTINUED | OUTPATIENT
Start: 2021-03-15 | End: 2021-03-15 | Stop reason: ALTCHOICE

## 2021-03-15 RX ADMIN — SODIUM CHLORIDE: 9 INJECTION, SOLUTION INTRAVENOUS at 10:48

## 2021-03-15 RX ADMIN — CEFOXITIN 2 G: 2 INJECTION, POWDER, FOR SOLUTION INTRAVENOUS at 09:50

## 2021-03-15 RX ADMIN — ROCURONIUM BROMIDE 10 MG: 10 INJECTION INTRAVENOUS at 09:41

## 2021-03-15 RX ADMIN — PROPOFOL 30 MG: 10 INJECTION, EMULSION INTRAVENOUS at 11:01

## 2021-03-15 RX ADMIN — HYDROMORPHONE HYDROCHLORIDE 0.5 MG: 1 INJECTION, SOLUTION INTRAMUSCULAR; INTRAVENOUS; SUBCUTANEOUS at 11:37

## 2021-03-15 RX ADMIN — SODIUM CHLORIDE: 9 INJECTION, SOLUTION INTRAVENOUS at 09:35

## 2021-03-15 RX ADMIN — FENTANYL CITRATE 100 MCG: 50 INJECTION, SOLUTION INTRAMUSCULAR; INTRAVENOUS at 09:42

## 2021-03-15 RX ADMIN — SUGAMMADEX 200 MG: 100 INJECTION, SOLUTION INTRAVENOUS at 10:46

## 2021-03-15 RX ADMIN — PROPOFOL 150 MG: 10 INJECTION, EMULSION INTRAVENOUS at 09:42

## 2021-03-15 RX ADMIN — SUCCINYLCHOLINE CHLORIDE 120 MG: 20 INJECTION, SOLUTION INTRAMUSCULAR; INTRAVENOUS at 09:42

## 2021-03-15 RX ADMIN — ROCURONIUM BROMIDE 20 MG: 10 INJECTION INTRAVENOUS at 09:54

## 2021-03-15 RX ADMIN — LIDOCAINE HYDROCHLORIDE 50 MG: 20 INJECTION, SOLUTION EPIDURAL; INFILTRATION; INTRACAUDAL; PERINEURAL at 09:42

## 2021-03-15 RX ADMIN — FENTANYL CITRATE 50 MCG: 50 INJECTION, SOLUTION INTRAMUSCULAR; INTRAVENOUS at 11:15

## 2021-03-15 RX ADMIN — FENTANYL CITRATE 100 MCG: 50 INJECTION, SOLUTION INTRAMUSCULAR; INTRAVENOUS at 09:58

## 2021-03-15 RX ADMIN — ONDANSETRON HYDROCHLORIDE 4 MG: 4 INJECTION, SOLUTION INTRAMUSCULAR; INTRAVENOUS at 10:46

## 2021-03-15 RX ADMIN — ACETAMINOPHEN 650 MG: 325 TABLET ORAL at 13:53

## 2021-03-15 RX ADMIN — FENTANYL CITRATE 50 MCG: 50 INJECTION, SOLUTION INTRAMUSCULAR; INTRAVENOUS at 11:32

## 2021-03-15 RX ADMIN — ONDANSETRON 4 MG: 2 INJECTION INTRAMUSCULAR; INTRAVENOUS at 13:53

## 2021-03-15 RX ADMIN — FENTANYL CITRATE 50 MCG: 50 INJECTION, SOLUTION INTRAMUSCULAR; INTRAVENOUS at 11:44

## 2021-03-15 RX ADMIN — SODIUM CHLORIDE: 9 INJECTION, SOLUTION INTRAVENOUS at 09:25

## 2021-03-15 RX ADMIN — FENTANYL CITRATE 50 MCG: 50 INJECTION, SOLUTION INTRAMUSCULAR; INTRAVENOUS at 11:49

## 2021-03-15 RX ADMIN — ROCURONIUM BROMIDE 10 MG: 10 INJECTION INTRAVENOUS at 10:11

## 2021-03-15 RX ADMIN — HYDROMORPHONE HYDROCHLORIDE 0.5 MG: 1 INJECTION, SOLUTION INTRAMUSCULAR; INTRAVENOUS; SUBCUTANEOUS at 11:25

## 2021-03-15 RX ADMIN — DEXAMETHASONE SODIUM PHOSPHATE 4 MG: 10 INJECTION, EMULSION INTRAMUSCULAR; INTRAVENOUS at 09:56

## 2021-03-15 ASSESSMENT — PULMONARY FUNCTION TESTS
PIF_VALUE: 11
PIF_VALUE: 14
PIF_VALUE: 1
PIF_VALUE: 16
PIF_VALUE: 1
PIF_VALUE: 11
PIF_VALUE: 1
PIF_VALUE: 16
PIF_VALUE: 18
PIF_VALUE: 18
PIF_VALUE: 12
PIF_VALUE: 12
PIF_VALUE: 1
PIF_VALUE: 14
PIF_VALUE: 13
PIF_VALUE: 1
PIF_VALUE: 18
PIF_VALUE: 18
PIF_VALUE: 37
PIF_VALUE: 16
PIF_VALUE: 14
PIF_VALUE: 1
PIF_VALUE: 19
PIF_VALUE: 1
PIF_VALUE: 1
PIF_VALUE: 17
PIF_VALUE: 0
PIF_VALUE: 3
PIF_VALUE: 16
PIF_VALUE: 20
PIF_VALUE: 2
PIF_VALUE: 12
PIF_VALUE: 12
PIF_VALUE: 18
PIF_VALUE: 10
PIF_VALUE: 17
PIF_VALUE: 13
PIF_VALUE: 10
PIF_VALUE: 1
PIF_VALUE: 16
PIF_VALUE: 15
PIF_VALUE: 18
PIF_VALUE: 17
PIF_VALUE: 18
PIF_VALUE: 18
PIF_VALUE: 1
PIF_VALUE: 6
PIF_VALUE: 17
PIF_VALUE: 1
PIF_VALUE: 1
PIF_VALUE: 11
PIF_VALUE: 1
PIF_VALUE: 17
PIF_VALUE: 16
PIF_VALUE: 17
PIF_VALUE: 13
PIF_VALUE: 12

## 2021-03-15 ASSESSMENT — PAIN DESCRIPTION - LOCATION
LOCATION: ABDOMEN;SHOULDER
LOCATION: ABDOMEN

## 2021-03-15 ASSESSMENT — PAIN SCALES - GENERAL
PAINLEVEL_OUTOF10: 2
PAINLEVEL_OUTOF10: 3
PAINLEVEL_OUTOF10: 3
PAINLEVEL_OUTOF10: 7
PAINLEVEL_OUTOF10: 3
PAINLEVEL_OUTOF10: 3

## 2021-03-15 ASSESSMENT — PAIN DESCRIPTION - PAIN TYPE: TYPE: SURGICAL PAIN

## 2021-03-15 ASSESSMENT — PAIN DESCRIPTION - FREQUENCY: FREQUENCY: CONTINUOUS

## 2021-03-15 NOTE — BRIEF OP NOTE
Brief Postoperative Note      Patient: Bindu Cabrera  YOB: 1955  MRN: [id number]    Date of Procedure: 3/15/2021    Pre-Op Diagnosis: GERD    Post-Op Diagnosis: GERD and Hiatal Hernia and Umbilical Hernia       Procedure(s):  ROBOTIC LINX, Hiatal hernia repair, Umbilical hernia repair. Surgeon(s):  Angelito Levy MD    Assistant:  First Assistant: Christina Nguyễn RN    Anesthesia: General/Local    Estimated Blood Loss (mL): 10 ml    Complications: None    Specimens:   * No specimens in log *    Implants:  Implant Name Type Inv.  Item Serial No.  Lot No. LRB No. Used Action   IMPLANT ANTIREFLX SZ 14 LAPSCP FUNDIC SPRNG MRI COMPATIBLE  IMPLANT ANTIREFLX SZ 14 LAPSCP FUNDIC SPRNG MRI COMPATIBLE  [facility] [id number] N/A 1 Implanted         Drains: * No LDAs found *    Findings: as above - see op note for details    Electronically signed by Angelito Levy MD on 3/15/2021 at 11:02 AM

## 2021-03-15 NOTE — ANESTHESIA PRE PROCEDURE
Department of Anesthesiology  Preprocedure Note       Name:  Jorge Horner   Age:  76 y.o.  :  1955                                          MRN:  [id number]         Date:  3/15/2021      Surgeon: Edi Nash):  Martínez Lilly MD    Procedure: Procedure(s):  ROBOTIC LINX    Medications prior to admission:   Prior to Admission medications    Medication Sig Start Date End Date Taking? Authorizing Provider   traZODone (DESYREL) 50 MG tablet Take by mouth nightly as needed for Sleep 1/2- 1 tab   Yes Historical Provider, MD   buPROPion (WELLBUTRIN XL) 150 MG extended release tablet TAKE 1 TABLET BY MOUTH IN THE MORNING  3/5/21  Yes Hoda Desai, APRN - CNP   Melatonin 10 MG TABS Take 1 tablet by mouth nightly as needed    Yes Historical Provider, MD   Eszopiclone (LUNESTA PO) Take by mouth as needed 5-10 mg   Yes Historical Provider, MD   Calcium Carbonate Antacid 1000 MG CHEW Take by mouth nightly    Yes Historical Provider, MD   Probiotic Product (PROBIOTIC PO) Take by mouth daily    Yes Historical Provider, MD   dexlansoprazole (DEXILANT) 60 MG CPDR delayed release capsule Take 1 capsule by mouth every evening 20  Yes Historical Provider, MD   Plant Sterol Stanol-Pantethine (CHOLESTOFF COMPLETE) 300-100 MG CAPS daily  09  Yes Historical Provider, MD   tadalafil (CIALIS) 5 MG tablet Take 1 tablet by mouth as needed for Erectile Dysfunction 20  Yes Flora Holliday MD   esomeprazole Magnesium (NEXIUM) 40 MG PACK Take 40 mg by mouth daily   Yes Historical Provider, MD   traZODone (DESYREL) 50 MG tablet TAKE 1/2-1 TABLET BY MOUTH AT BEDTIME 20  Yes Flora Holliday MD   tamsulosin (FLOMAX) 0.4 MG capsule Take 2 capsules by mouth daily 20  Yes Flora Holliday MD   atorvastatin (LIPITOR) 10 MG tablet TAKE 1 TABLET BY MOUTH ONE TIME A DAY  3/24/20  Yes Hoda Desai, APRN - CNP   fluticasone (FLONASE) 50 MCG/ACT nasal spray 2 sprays by Nasal route daily.  1/30/15  Yes Walker Chan MD Annabel   loratadine (CLARITIN) 10 MG tablet Take 10 mg by mouth daily    Yes Historical Provider, MD   aspirin 81 MG EC tablet Take 81 mg by mouth daily. Yes Historical Provider, MD   fish oil-omega-3 fatty acids 1000 MG capsule Take 1 g by mouth daily    Yes Historical Provider, MD   therapeutic multivitamin-minerals (THERAGRAN-M) tablet Take 1 tablet by mouth daily. occasionally   Yes Historical Provider, MD       Current medications:    Current Facility-Administered Medications   Medication Dose Route Frequency Provider Last Rate Last Admin    sodium chloride flush 0.9 % injection 10 mL  10 mL Intravenous 2 times per day Shae Hickman MD        sodium chloride flush 0.9 % injection 10 mL  10 mL Intravenous PRN Shae Hickman MD        0.9 % sodium chloride infusion   Intravenous Continuous Shae Hickman MD        cefOXitin (MEFOXIN) 2000 mg in dextrose 5% 50 mL (mini-bag)  2,000 mg Intravenous On Call to [facility] MD Tu           Allergies: Allergies   Allergen Reactions    Seasonal      hayfever per pt.        Problem List:    Patient Active Problem List   Diagnosis Code    Hyperlipidemia E78.5    GERD (gastroesophageal reflux disease) K21.9    Chronic allergic rhinitis J30.9    Benign non-nodular prostatic hyperplasia with lower urinary tract symptoms N40.1    IFG (impaired fasting glucose) R73.01    Obstructive sleep apnea on CPAP G47.33, Z99.89    Insomnia G47.00    Erectile dysfunction N52.9    Adjustment disorder with depressed mood F43.21       Past Medical History:        Diagnosis Date    Allergic     Allergic rhinitis     Arthritis     Back pain     Depression     GERD (gastroesophageal reflux disease)     Hyperlipidemia     Prolonged emergence from general anesthesia     Prostatism     Unspecified sleep apnea     has CPAP       Past Surgical History:        Procedure Laterality Date    CARDIAC CATHETERIZATION      COLONOSCOPY  2011    ESOPHAGEAL PH MONITORING N/A 2/16/2021    ESOPHAGEAL PH MONITORING (NO EGD) performed by Sean Le MD at [facility] OTHER SURGICAL HISTORY      tendonitus repair     RETINAL DETACHMENT SURGERY      UPPER GASTROINTESTINAL ENDOSCOPY N/A 1/15/2021    RANDALL performed by Susan Dunlap MD at [address] Endoscopy   [facility]  [facility]    EGD BIOPSY performed by Susan Dunlap MD at [address] Endoscopy       Social History:    Social History     Tobacco Use    Smoking status: Never Smoker    Smokeless tobacco: Never Used   Substance Use Topics    Alcohol use: Not Currently     Alcohol/week: 0.0 standard drinks                                Counseling given: Not Answered      Vital Signs (Current):   Vitals:    03/09/21 1621 03/15/21 0845   BP:  118/76   Pulse:  64   Resp:  18   Temp:  97.2 °F (36.2 °C)   TempSrc:  Temporal   SpO2:  97%   Weight: 165 lb (74.8 kg) 168 lb (76.2 kg)   Height: 5' 8\" (1.727 m) 5' 8\" (1.727 m)                                              BP Readings from Last 3 Encounters:   03/15/21 118/76   03/05/21 102/70   02/26/21 102/60       NPO Status:                                                                                 BMI:   Wt Readings from Last 3 Encounters:   03/15/21 168 lb (76.2 kg)   03/05/21 169 lb (76.7 kg)   02/26/21 169 lb 9.6 oz (76.9 kg)     Body mass index is 25.54 kg/m².     CBC:   Lab Results   Component Value Date    WBC 6.1 03/01/2021    RBC 4.86 03/01/2021    RBC 4.93 05/24/2016    HGB 15.1 03/01/2021    HCT 45.7 03/01/2021    MCV 94.0 03/01/2021    RDW 12.9 06/07/2017     03/01/2021       CMP:   Lab Results   Component Value Date     03/01/2021    K 4.4 03/01/2021     03/01/2021    CO2 32 03/01/2021    BUN 18 03/01/2021    CREATININE 1.1 03/01/2021    LABGLOM 67 03/01/2021    GLUCOSE 102 03/01/2021    GLUCOSE 106 08/03/2020    PROT 7.0 08/03/2020    CALCIUM 9.5 03/01/2021    BILITOT 1.0 08/03/2020 ALKPHOS 91 08/03/2020    ALKPHOS 82 12/12/2019    AST 18 08/03/2020    ALT 19 08/03/2020       POC Tests: No results for input(s): POCGLU, POCNA, POCK, POCCL, POCBUN, POCHEMO, POCHCT in the last 72 hours. Coags: No results found for: PROTIME, INR, APTT    HCG (If Applicable): No results found for: PREGTESTUR, PREGSERUM, HCG, HCGQUANT     ABGs: No results found for: PHART, PO2ART, MBF3ECX, THA8QSS, BEART, Q2QZNNAD     Type & Screen (If Applicable):  No results found for: LABABO, LABRH    Drug/Infectious Status (If Applicable):  No results found for: HIV, HEPCAB    COVID-19 Screening (If Applicable):   Lab Results   Component Value Date    COVID19 Not Detected 03/09/2021           Anesthesia Evaluation  Patient summary reviewed and Nursing notes reviewed history of anesthetic complications:   Airway: Mallampati: II        Dental:          Pulmonary: breath sounds clear to auscultation  (+) sleep apnea: on CPAP,                             Cardiovascular:  Exercise tolerance: good (>4 METS),         ECG reviewed  Rhythm: regular  Rate: normal                    Neuro/Psych:   (+) psychiatric history:            GI/Hepatic/Renal:   (+) GERD: poorly controlled,           Endo/Other:                     Abdominal:       Abdomen: soft. Vascular:                                        Anesthesia Plan      general     ASA 2       Induction: intravenous. MIPS: Postoperative opioids intended and Prophylactic antiemetics administered. Anesthetic plan and risks discussed with patient and spouse. Plan discussed with CRNA.                   Rosi Velazquez DO   3/15/2021

## 2021-03-15 NOTE — INTERVAL H&P NOTE
Update History & Physical    The patient's History and Physical was reviewed with the patient and I examined the patient. There was no change. The surgical site was confirmed by the patient and me. Plan: The risks, benefits, expected outcome, and alternative to the recommended procedure have been discussed with the patient. Patient understands and wants to proceed with the procedure. The patient was counseled at length about the risks of clay Covid-19 during their perioperative period and any recovery window from their procedure. The patient was made aware that clay Covid-19  may worsen their prognosis for recovering from their procedure  and lend to a higher morbidity and/or mortality risk. All material risks, benefits, and reasonable alternatives including postponing the procedure were discussed. The patient does wish to proceed with the procedure at this time.     Electronically signed by Shae Hickman MD on 3/15/2021 at 8:59 AM

## 2021-03-15 NOTE — PROGRESS NOTES
1107 pt arrived to PACU, c/o abdominal and shoulder pain.  VSS, resp easy  1115 c/o pain 7/10, medicated with 50 mcg fentanyl  1125 no change in pain status, medicated with 0.5 mg dilaudid  1132 no change in pain status, medicated with 50 mcg fentanyl  1137 no change in pain status, medicated with 0.5 mg dilaudid  1144 no change in pain status, medicated with 50 mcg fentanyl  1149 no change in pain status, medicated with 50 mcg fentanyl  1159 pt states pain 3/10 and tolerable, not medicated at this time  1209 meets criteria for discharge from PACU, transported to [facility]
ADMITTED TO [facility] AND ORIENTED TO UNIT. SCDS ON. FALL AND ALLERGY BANDS ON. PT VERBALIZED APPROVAL FOR FIRST NAME, LAST INITIAL AND PHYSICIAN NAME ON UNIT WHITEBOARD. Wife, Brenton Garzal with the patient.
Covid screening questionnaire complete and negative for symptoms or exposure see chart for documentation.   Please limit your exposure to the public after you have your covid test  Please call your doctor immediately if you develop any symptoms of covid prior to your surgery
Following instructions given to patient, who states understanding:    NPO after midnight  [employer] and 's license  Wear comfortable clean clothing  Do not bring jewelry   Shower night before and morning of surgery with a liquid antibacterial soap  Bring medications in original bottles  Follow all instructions given by your physician   needed at discharge  Call [phone] for any questions  Report to SDS on 2nd floor  If you would become ill prior to surgery, please call the surgeon  May have only 1 visitor accompany you for surgery  Please bring and wear mask  You will be receiving a phone call one or two days before surgery to review covid screening exposure  Bring CPAP
Patient contacted regarding COVID-19 screen. Voicemail left to call back/.
Pt has met discharge criteria and states he is ready for discharge to home. IV removed, gauze and tape applied. Dressed in own clothes and personal belongings gathered. Discharge instructions (with opioid medication education information) given to pt and family; pt and family verbalized understanding of discharge instructions, prescriptions x2 and follow up appointments. Pt transported to discharge lobby by [facility] staff.
Pt returned to [facility] room 15. Vitals and assessment as charted. 0.9 infusing, @50ml to count from PACU. Pt has jello and water. Family at the bedside. Pt and family verbalized understanding of discharge criteria and call light use. Call light in reach.
The patient rests easily. Spouse remains in the room.
09-Aug-2020 09:04

## 2021-03-15 NOTE — ANESTHESIA POSTPROCEDURE EVALUATION
Department of Anesthesiology  Postprocedure Note    Patient: Talib Mcneil  MRN: [id number]  YOB: 1955  Date of evaluation: 3/15/2021  Time:  12:39 PM     Procedure Summary     Date: 03/15/21 [facility]    Anesthesia Start: 0935 Anesthesia Stop: 4619    Procedure: ROBOTIC LINX, Hiatal hernia repair, Umbilical hernia repair. (N/A Abdomen) Diagnosis: (GERD)    Surgeons: Fatimah Shook MD Responsible Provider: Ifrah Shaffer DO    Anesthesia Type: general ASA Status: 2          Anesthesia Type: general    Renata Phase I: Renata Score: 9    Renata Phase II:      Last vitals: Reviewed and per EMR flowsheets.        Anesthesia Post Evaluation    Patient location during evaluation: PACU  Patient participation: complete - patient participated  Level of consciousness: awake  Airway patency: patent  Nausea & Vomiting: no nausea  Complications: no  Cardiovascular status: hemodynamically stable  Respiratory status: acceptable  Hydration status: stable

## 2021-03-16 NOTE — OP NOTE
[address] [id number]                                OPERATIVE REPORT    PATIENT NAME: Perez Mendoza                      :        1955  MED REC NO:   [id number]                           ROOM:  ACCOUNT NO:   [de-identified]                           ADMIT DATE: 03/15/2021  PROVIDER:     Tsesie Landa M.D.    DATE OF PROCEDURE:  03/15/2021    PREOPERATIVE DIAGNOSIS:  Chronic progressive gastroesophageal reflux  disease. POSTOPERATIVE DIAGNOSES:  1. Chronic progressive gastroesophageal reflux disease. 2.  Hiatal hernia. 3.  Umbilical hernia. PROCEDURES:  1.  Robotic hiatal hernia repair with magnetic sphincter augmentation  (LINX #14). 2.  Repair of umbilical hernia. SURGEON:  Tessie Landa MD    ASSISTANT:  ZAC Mercer    ANESTHESIA:  General/local.    ESTIMATED BLOOD LOSS:  Less than 10 mL. DRAINS:  None. COMPLICATIONS:  None. DISPOSITION:  Stable to the recovery room. INDICATIONS:  The patient is a 60-year-old gentleman who presented to  the office with worsening gastroesophageal reflux disease. He wished to  proceed with surgical intervention. Both fundoplication and magnetic  sphincter augmentation discussed in detail. Risks of surgery were  further discussed. Some of the risks included, but were not limited to  bleeding, infection, the need for re-operation, severe chronic  postoperative pain or numbness, major vascular or nerve injury,  cardiopulmonary complications, anesthetic complications, seroma/hematoma  formation, wound breakdown, trocar site herniation, severe dysphagia,  major esophageal injury, vagal nerve injury, recurrence of the hiatal  hernias, chronic pain, and death. After all of the questions were  answered in their entirety and the patient was completely aware of the  current situation, he wished to proceed with magnetic sphincter  augmentation with LINX insertion. DESCRIPTION OF PROCEDURE:  After informed consent was signed and placed  on the chart, the patient was taken back to the operating room and  placed supine on the operating room table. General anesthesia was  induced. He tolerated this well throughout the case. All pressure  points were padded. He was on preoperative antibiotics. Bilateral  lower extremity sequential compression devices were placed prior to  incision. His abdomen and pelvis were prepped and draped in the usual  sterile standard fashion. A timeout occurred prior to the operation  which not only identified the patient, but also the planned procedure to  be performed. At the end of the timeout, there were no questions or  concerns. I began the operation by making a small skin nick at Sibley's point. Veress needle was inserted. Intraabdominal cavity was insufflated to a  pressure of approximately 15 mmHg with carbon dioxide gas. The patient  tolerated the insufflation well. 8-mm trocar was then placed there at  the periumbilical region. Laparoscope was inserted. Upon initial  evaluation, there was no hollow viscus, solid organ, or major vascular  injury with Veress needle insertion or the first trocar placement. Three other 8-mm trocars as well as a 5-mm trocar were then placed in  their standard location under direct vision. Liver retractor was  brought through the 5 mm there in the right upper quadrant and secured  under direct vision. Assistant 11 mm was then placed there in the right  lower quadrant. He was placed in reverse Trendelenburg. Robot was  brought in and docked. Instruments were placed under direct vision. Once everything was aligned and in order, I then unscrubbed, went back  to the console. I began the operation first by evaluating the hiatus. He actually had a  small hiatal hernia.   Pars flaccida and phrenoesophageal membrane were  dissected through there on the right side mobilizing the right andra and extending up into the posterior mediastinum. This was extended along  the anterior border all the way over to the left andra which was then  similarly dissected out. Finally, dissection was carried out from the  right to left side posteriorly, fully fraying up the esophagus as this  was continued to be dissected circumferentially up into the posterior  mediastinum. The distal aspect of the esophagus was fully mobilized. By this point, I was unable to reduce the GE junction down into the  abdomen in a resting position with no undue tension. Posterior vagus nerve was identified and this was dissected out. Penrose drain was placed for better retraction. Posteriorly, the left  and right crura were then reapproximated with three separate 0 Ethibond  sutures. There was one placed anteriorly. This reapproximated the andra  muscle up around the esophagus in a standard fashion. This was nice and  snugged, but not too tight on direct visualization. Sizer was then  brought in. Distal esophagus there at the GE junction was measured and  then, #14 LINX device was chosen. This was agreed upon with [facility]  representative. #14 was brought in and secured around the distal  esophagus there at the GE junction. This was placed on the inner border  of the posterior vagus. This allowed the posterior vagus to help anchor  the LINX in the appropriate position to prevent migration. Sutures on  the LINX device themselves were cut and discarded. No other  abnormalities were identified. At that time, the hiatal hernia had been completely repaired and  reapproximated and the LINX inserted without difficulty. Instruments  were removed. Robot was undocked. I then scrubbed back into the case. The periumbilical incision was then opened up with electrocautery and a  15-blade scalpel. Periumbilical hernia sac was identified and fully  dissected out. This was excised in its entirety down its base next to  the fascia. Hemostasis was adequate. Defect was only about 1 cm to 1.5  cm. This was primarily reapproximated at the fascia. Borders were nice  and strong and the defect was small. This was reapproximated with  multiple interrupted 0 Ethibond sutures in a figure-of-eight. Irrigation. Hemostasis was adequate. Skin was reapproximated at all  the incisional sites with 4-0 Vicryl in a subcuticular fashion. Closed  incisions were then clean, dry, and Steri-Strips applied. Sponge,  needle, and instrumentation count was correct at the end of the  procedure. The patient tolerated the procedure well with no apparent  complications and less than 10 mL of blood loss. He was able to be  brought out of general anesthesia and transferred to postanesthesia care  unit in stable condition.         Ankita Chacon M.D.    D: 03/15/2021 11:15:07       T: 03/15/2021 18:32:07     [email or web address]  Job#: [id number]     Doc#: [id number]    CC:

## 2021-03-23 ENCOUNTER — OFFICE VISIT (OUTPATIENT)
Dept: BARIATRICS/WEIGHT MGMT | Age: 66
End: 2021-03-23

## 2021-03-23 VITALS
HEART RATE: 62 BPM | SYSTOLIC BLOOD PRESSURE: 122 MMHG | HEIGHT: 67 IN | TEMPERATURE: 97.5 F | BODY MASS INDEX: 26.21 KG/M2 | DIASTOLIC BLOOD PRESSURE: 87 MMHG | WEIGHT: 167 LBS

## 2021-03-23 DIAGNOSIS — Z98.890 HISTORY OF REPAIR OF HIATAL HERNIA: ICD-10-CM

## 2021-03-23 DIAGNOSIS — Z96.9 PRESENCE OF FUNCTIONAL IMPLANT: Primary | ICD-10-CM

## 2021-03-23 DIAGNOSIS — Z09 S/P UMBILICAL HERNIA REPAIR, FOLLOW-UP EXAM: ICD-10-CM

## 2021-03-23 DIAGNOSIS — Z87.19 HISTORY OF REPAIR OF HIATAL HERNIA: ICD-10-CM

## 2021-03-23 DIAGNOSIS — K21.9 GASTROESOPHAGEAL REFLUX DISEASE WITHOUT ESOPHAGITIS: ICD-10-CM

## 2021-03-23 PROCEDURE — 99024 POSTOP FOLLOW-UP VISIT: CPT | Performed by: PHYSICIAN ASSISTANT

## 2021-03-23 NOTE — PATIENT INSTRUCTIONS
 Stay well hydrated. Drink a minimum of 64 oz of water daily to avoid dehydration   Nutritional education occurred during visit. Tolerating diet. Continue following  with dietitian and follow their recommendations.  Eat soft/ solid foods frequently. Recommended to eat one tablespoon of yogurt, pudding, applesauce or one cracker every hour while awake to help prevent dysphagia.  Reminded to cut foods into small pieces, eat slowly and chew thoroughly.  Advised to eat room temperature/warm foods and fluids to help prevent esophageal spasms.  Avoid breads, toast, crust, pizza and meat that is dry for 3 weeks after procedure   Limit or avoid gas-forming foods including corn, asparagus, broccoli, brussel  sprouts, cabbage, cauliflower, dried beans, peas, soys and sugar alcohols.  Avoid carbonated drinks    Signs and symptoms reviewed with patient that would be concerning and need her to return to office for re-evaluation. Patient will call if any questions or  concerns arrise.  Plan discussed to start weaning off PPI medication: Start taking Nexium every other day for 1 week and then stop. Continue Dexilant QHS.  Avoiding lifting, pushing, pulling over 20# for 4 weeks.  Return to office in 1-2 weeks.

## 2021-04-05 DIAGNOSIS — F43.21 ADJUSTMENT DISORDER WITH DEPRESSED MOOD: ICD-10-CM

## 2021-04-05 DIAGNOSIS — E78.5 HYPERLIPIDEMIA, UNSPECIFIED HYPERLIPIDEMIA TYPE: ICD-10-CM

## 2021-04-05 RX ORDER — BUPROPION HYDROCHLORIDE 150 MG/1
TABLET ORAL
Qty: 90 TABLET | Refills: 0 | Status: SHIPPED | OUTPATIENT
Start: 2021-04-05 | End: 2021-08-23

## 2021-04-05 RX ORDER — ATORVASTATIN CALCIUM 10 MG/1
TABLET, FILM COATED ORAL
Qty: 90 TABLET | Refills: 0 | Status: SHIPPED | OUTPATIENT
Start: 2021-04-05 | End: 2021-05-12

## 2021-04-05 NOTE — TELEPHONE ENCOUNTER
This medication refill is regarding a electronic request by Tomasa Lechuga. Requested Prescriptions     Pending Prescriptions Disp Refills    buPROPion (WELLBUTRIN XL) 150 MG extended release tablet [Pharmacy Med Name: buPROPion HCl ER (XL) Oral Tablet Extended Release 24 Hour 150 MG] 90 tablet 0     Sig: TAKE 1 TABLET BY MOUTH IN THE MORNING       Date of last visit: 8/11/2020  Date of next visit: 6/17/2021  Date of last refill: 3/5/2021  90/0      Rx verified, ordered and set to EP.

## 2021-04-05 NOTE — TELEPHONE ENCOUNTER
This medication refill is regarding a electronic request by Wendi Johnson. Requested Prescriptions     Pending Prescriptions Disp Refills    atorvastatin (LIPITOR) 10 MG tablet [Pharmacy Med Name: Atorvastatin Calcium Oral Tablet 10 MG] 90 tablet 0     Sig: TAKE 1 TABLET BY MOUTH ONE TIME A DAY       Date of last visit: 3/5/2021  Date of next visit: 6/17/2021  Date of last refill: 3/24/2020  90/3    Last Lipid Panel:    Lab Results   Component Value Date    CHOL 113 08/03/2020    CHOL 159 01/29/2014    TRIG 122 08/03/2020    HDL 39 08/03/2020    LDLCALC 50 08/03/2020     Last CMP:   Lab Results   Component Value Date     03/01/2021    K 4.4 03/01/2021     03/01/2021    CO2 32 03/01/2021    BUN 18 03/01/2021    CREATININE 1.1 03/01/2021    GLUCOSE 102 03/01/2021    CALCIUM 9.5 03/01/2021    PROT 7.0 08/03/2020    LABALBU 4.5 08/03/2020    BILITOT 1.0 08/03/2020    ALKPHOS 91 08/03/2020    AST 18 08/03/2020    ALT 19 08/03/2020    LABGLOM 67 (A) 03/01/2021       Rx verified, ordered and set to EP.

## 2021-04-07 ENCOUNTER — INITIAL CONSULT (OUTPATIENT)
Dept: BARIATRICS/WEIGHT MGMT | Age: 66
End: 2021-04-07

## 2021-04-07 VITALS
SYSTOLIC BLOOD PRESSURE: 106 MMHG | HEART RATE: 80 BPM | BODY MASS INDEX: 25.69 KG/M2 | DIASTOLIC BLOOD PRESSURE: 66 MMHG | WEIGHT: 164 LBS | TEMPERATURE: 97.7 F

## 2021-04-07 DIAGNOSIS — K21.9 GASTROESOPHAGEAL REFLUX DISEASE WITHOUT ESOPHAGITIS: ICD-10-CM

## 2021-04-07 DIAGNOSIS — Z96.9 PRESENCE OF FUNCTIONAL IMPLANT: Primary | ICD-10-CM

## 2021-04-07 DIAGNOSIS — Z87.19 HISTORY OF REPAIR OF HIATAL HERNIA: ICD-10-CM

## 2021-04-07 DIAGNOSIS — Z98.890 HISTORY OF REPAIR OF HIATAL HERNIA: ICD-10-CM

## 2021-04-07 DIAGNOSIS — Z09 S/P UMBILICAL HERNIA REPAIR, FOLLOW-UP EXAM: ICD-10-CM

## 2021-04-07 PROCEDURE — 99024 POSTOP FOLLOW-UP VISIT: CPT | Performed by: PHYSICIAN ASSISTANT

## 2021-04-07 NOTE — PROGRESS NOTES
[facility]  [address]  [facility], [facility]  [phone]      Visit Date:  4/7/2021  LINX Postop Follow-up    HPI:      Mario Robles is a 76 y.o. male who is here today for 3 week follow up since Robotic-assisted hiatal hernia repair with Linx device placement by Dr. Gilda Martinez on 3/15/21. Pt reports that he is doing much better. Rare dysphagia. Only having dysphagia if eating too quit, too fast or too much. Able to tolerate fluids. Able to tolerate solid foods. Chewing well and taking plenty of time to eat meals. Tolerating post-op diet. Encouraged to eat a bite of food every 1-2 hours while awake to exercise the device, and keep it mobile, to prevent long term problems with swallowing. Intermittent constipation and diarrhea. Has been adjusting stool softeners prn. Bowels have been regular for the last 3-4 days without stool softeners. No nausea or emesis. No fever or chills. Denies heartburn/ GERD - continues to take [address]. Off Nexium. Will start weaning off [address]. Denies sx of dehydration. Denies CP/SOB. No Abdominal pain. Off all pain meds. Current BMI: Body mass index is 25.69 kg/m².   Current Weight:   Wt Readings from Last 3 Encounters:   04/07/21 164 lb (74.4 kg)   03/23/21 167 lb (75.8 kg)   03/15/21 168 lb (76.2 kg)         Past Medical History:  Past Medical History:   Diagnosis Date    Allergic     Allergic rhinitis     Arthritis     Back pain     Depression     GERD (gastroesophageal reflux disease)     Hyperlipidemia     Prolonged emergence from general anesthesia     Prostatism     Unspecified sleep apnea     has CPAP       Past Surgical History:  Past Surgical History:   Procedure Laterality Date    CARDIAC CATHETERIZATION      COLONOSCOPY  2011    ESOPHAGEAL PH MONITORING N/A 2/16/2021    ESOPHAGEAL PH MONITORING (NO EGD) performed by Paulette Yanez MD at [facility] 23 GASTRIC FUNDOPLICATION N/A (COQ-10) 100 MG CAPS Take by mouth      buPROPion (WELLBUTRIN XL) 150 MG extended release tablet TAKE 1 TABLET BY MOUTH IN THE MORNING  90 tablet 0    atorvastatin (LIPITOR) 10 MG tablet TAKE 1 TABLET BY MOUTH ONE TIME A DAY  90 tablet 0    dexlansoprazole (DEXILANT) 60 MG CPDR delayed release capsule Take 1 capsule by mouth every evening      tamsulosin (FLOMAX) 0.4 MG capsule Take 2 capsules by mouth daily 180 capsule 3    fluticasone (FLONASE) 50 MCG/ACT nasal spray 2 sprays by Nasal route daily. 3 Bottle 3    loratadine (CLARITIN) 10 MG tablet Take 10 mg by mouth daily       aspirin 81 MG EC tablet Take 81 mg by mouth daily.  ketorolac (TORADOL) 10 MG tablet Take 1 tablet by mouth every 12 hours as needed for Pain 10 tablet 0    Melatonin 10 MG TABS Take 1 tablet by mouth nightly as needed       Eszopiclone (LUNESTA PO) Take by mouth as needed 5-10 mg      Calcium Carbonate Antacid 1000 MG CHEW Take by mouth nightly       Probiotic Product (PROBIOTIC PO) Take by mouth daily       Plant Sterol Stanol-Pantethine (CHOLESTOFF COMPLETE) 300-100 MG CAPS daily       tadalafil (CIALIS) 5 MG tablet Take 1 tablet by mouth as needed for Erectile Dysfunction (Patient not taking: Reported on 3/23/2021) 30 tablet 0    esomeprazole Magnesium (NEXIUM) 40 MG PACK Take 40 mg by mouth daily      traZODone (DESYREL) 50 MG tablet TAKE 1/2-1 TABLET BY MOUTH AT BEDTIME (Patient not taking: Reported on 3/23/2021) 30 tablet 5    fish oil-omega-3 fatty acids 1000 MG capsule Take 1 g by mouth daily       therapeutic multivitamin-minerals (THERAGRAN-M) tablet Take 1 tablet by mouth daily. occasionally       No current facility-administered medications for this visit. Allergies: Allergies   Allergen Reactions    Seasonal      hayfever per pt. Subjective:    Review of Systems:  Constitutional: Denies any fever, chills, fatigue. Wound: Denies any rash, skin color changes or wound problems.   Resp: Denies any cough, shortness of breath. CV: Denies any chest pain, orthopnea or syncope. MS: Denies myalgias, arthralgias. GI: Denies any nausea, vomiting, diarrhea, constipation, melena, hematochezia. No incisional discomfort. (+) dysphagia  : Denies any hematuria, hesitancy or dysuria. NEURO: Denies seizures, headache. Objective:    /66 (Site: Right Upper Arm, Position: Sitting, Cuff Size: Large Adult)   Pulse 80   Temp 97.7 °F (36.5 °C) (Infrared)   Wt 164 lb (74.4 kg)   BMI 25.69 kg/m²     Physical Examination:   Constitutional: Alert and oriented to person, place and time. Well-developed, well- nourished. Head: Normocephalic and atraumatic  Neck: Supple. Eyes: EOMI b/l. Conjunctivae normal.  No scleral icterus. Respiratory: Effort normal. No respiratory distress. Abd: Incisions are well healed. Non-tender. No sign of infection. Ext:  Movement x 4. No edema  Skin; Warm and dry, no visible rashes, lesions or ulcers.    Neuro: Cranial Nerves Grossly Intact; nml coordination    Labs:  CBC   Lab Results   Component Value Date    WBC 6.1 03/01/2021    RBC 4.86 03/01/2021    RBC 4.93 05/24/2016    HGB 15.1 03/01/2021    HCT 45.7 03/01/2021    MCV 94.0 03/01/2021    MCH 31.1 03/01/2021    MCHC 33.0 03/01/2021    RDW 12.9 06/07/2017     03/01/2021    MPV 10.8 03/01/2021    RBCMORP NORMAL 06/07/2017    SEGSPCT 70.5 03/01/2021    LABLYMP 20.0 03/01/2021    MONOPCT 7.6 03/01/2021    LABEOS 1.2 03/01/2021    BASO 0.5 03/01/2021    NRBC 0 03/01/2021    SEGSABS 4.3 03/01/2021    LYMPHSABS 1.2 03/01/2021    MONOSABS 0.5 03/01/2021    EOSABS 0.1 03/01/2021    BASOSABS 0.0 03/01/2021        BMP/CMP   Lab Results   Component Value Date    GLUCOSE 102 03/01/2021    GLUCOSE 106 08/03/2020    CREATININE 1.1 03/01/2021    BUN 18 03/01/2021     03/01/2021    K 4.4 03/01/2021     03/01/2021    CO2 32 03/01/2021    CALCIUM 9.5 03/01/2021    AST 18 08/03/2020    ALKPHOS 91 08/03/2020    ALKPHOS 82 12/12/2019    PROT 7.0 08/03/2020    LABALBU 4.5 08/03/2020    BILITOT 1.0 08/03/2020    ALT 19 08/03/2020        PREALBUMIN   No results found for: PREALBUMIN     Assessment:       Diagnosis Orders   1. Presence of functional implant-linx device     2. History of repair of hiatal hernia     3. S/P umbilical hernia repair, follow-up exam     4. Gastroesophageal reflux disease without esophagitis       Plan:     Stay well hydrated. Drink a minimum of 64 oz of water daily to avoid dehydration   Nutritional education occurred during visit. Tolerating diet. Continue following  with dietitian and follow their recommendations.  Eat soft/ solid foods frequently. Recommended to eat one tablespoon of yogurt, pudding, applesauce or one cracker every hour while awake to help prevent dysphagia.  Reminded to cut foods into small pieces, eat slowly and chew thoroughly.  Advised to eat room temperature/warm foods and fluids to help prevent esophageal spasms.  Limit or avoid gas-forming foods including corn, asparagus, broccoli, brussel  sprouts, cabbage, cauliflower, dried beans, peas, soys and sugar alcohols.  Avoid carbonated drinks    Signs and symptoms reviewed with patient that would be concerning and need her to return to office for re-evaluation. Patient will call if any questions or  concerns arrise.  Plan discussed to start weaning off PPI medication: take Dexilant every other day for a week, then every third day for a week, then stop.  Avoiding lifting, pushing, pulling over 20# for 4 weeks.  Return to office in 4 weeks  Return in about 1 month (around 5/7/2021) for postop follow up. I spent over 15 minutes with the patient, with greater that 50% of that time spent on education, counseling and coordination of care.     Electronically signed by BAILEY Westbrook on 4/7/2021 at 4:19 PM

## 2021-04-07 NOTE — PATIENT INSTRUCTIONS
 Stay well hydrated. Drink a minimum of 64 oz of water daily to avoid dehydration   Nutritional education occurred during visit. Tolerating diet. Continue following  with dietitian and follow their recommendations.  Eat soft/ solid foods frequently. Recommended to eat one tablespoon of yogurt, pudding, applesauce or one cracker every hour while awake to help prevent dysphagia.  Reminded to cut foods into small pieces, eat slowly and chew thoroughly.  Advised to eat room temperature/warm foods and fluids to help prevent esophageal spasms.  Limit or avoid gas-forming foods including corn, asparagus, broccoli, brussel  sprouts, cabbage, cauliflower, dried beans, peas, soys and sugar alcohols.  Avoid carbonated drinks    Signs and symptoms reviewed with patient that would be concerning and need her to return to office for re-evaluation. Patient will call if any questions or  concerns arrise.  Plan discussed to start weaning off PPI medication: take Dexilant every other day for a week, then every third day for a week, then stop.  Avoiding lifting, pushing, pulling over 20# for 4 weeks.     Return to office in 4 weeks

## 2021-05-12 DIAGNOSIS — E78.5 HYPERLIPIDEMIA, UNSPECIFIED HYPERLIPIDEMIA TYPE: ICD-10-CM

## 2021-05-12 DIAGNOSIS — N40.1 BENIGN NON-NODULAR PROSTATIC HYPERPLASIA WITH LOWER URINARY TRACT SYMPTOMS: ICD-10-CM

## 2021-05-12 RX ORDER — TAMSULOSIN HYDROCHLORIDE 0.4 MG/1
CAPSULE ORAL
Qty: 180 CAPSULE | Refills: 3 | Status: ON HOLD | OUTPATIENT
Start: 2021-05-12 | End: 2022-06-06

## 2021-05-12 RX ORDER — ATORVASTATIN CALCIUM 10 MG/1
TABLET, FILM COATED ORAL
Qty: 90 TABLET | Refills: 3 | Status: ON HOLD | OUTPATIENT
Start: 2021-05-12 | End: 2022-09-28

## 2021-05-12 NOTE — TELEPHONE ENCOUNTER
This medication refill is regarding a electronic request by FiveRunschristina Mosqueda.     Requested Prescriptions     Pending Prescriptions Disp Refills    tamsulosin (FLOMAX) 0.4 MG capsule [Pharmacy Med Name: Tamsulosin HCl Oral Capsule 0.4 MG] 180 capsule 0     Sig: TAKE 2 CAPSULES BY MOUTH ONE TIME A DAY    atorvastatin (LIPITOR) 10 MG tablet [Pharmacy Med Name: Atorvastatin Calcium Oral Tablet 10 MG] 90 tablet 0     Sig: TAKE 1 TABLET BY MOUTH EVERY DAY       Date of last visit: 3/5/2021  Date of next visit: 6/17/2021  Date of last refill: Tamsulosin  4/21/2020  180/3  Atorvastatin 4/5/2021  90/0

## 2021-05-13 ENCOUNTER — OFFICE VISIT (OUTPATIENT)
Dept: BARIATRICS/WEIGHT MGMT | Age: 66
End: 2021-05-13

## 2021-05-13 VITALS
BODY MASS INDEX: 26.53 KG/M2 | TEMPERATURE: 98.1 F | DIASTOLIC BLOOD PRESSURE: 68 MMHG | WEIGHT: 169 LBS | SYSTOLIC BLOOD PRESSURE: 122 MMHG | HEART RATE: 84 BPM | HEIGHT: 67 IN

## 2021-05-13 DIAGNOSIS — K21.9 GASTROESOPHAGEAL REFLUX DISEASE WITHOUT ESOPHAGITIS: ICD-10-CM

## 2021-05-13 DIAGNOSIS — Z98.890 HISTORY OF REPAIR OF HIATAL HERNIA: ICD-10-CM

## 2021-05-13 DIAGNOSIS — Z87.19 HISTORY OF REPAIR OF HIATAL HERNIA: ICD-10-CM

## 2021-05-13 DIAGNOSIS — Z96.9 PRESENCE OF FUNCTIONAL IMPLANT: Primary | ICD-10-CM

## 2021-05-13 DIAGNOSIS — Z09 S/P UMBILICAL HERNIA REPAIR, FOLLOW-UP EXAM: ICD-10-CM

## 2021-05-13 PROCEDURE — 99024 POSTOP FOLLOW-UP VISIT: CPT | Performed by: PHYSICIAN ASSISTANT

## 2021-05-13 NOTE — PROGRESS NOTES
[facility]  [id number]  [place], [address]  [phone]      Visit Date:  5/13/2021  LINX Postop Follow-up    HPI:      Inocencio Abdullahi is a 76 y.o. male who is here today for 2 month follow up since Robotic-assisted hiatal hernia repair with Linx device placement by Dr. Maegn Servin on 3/15/21. Pt reports that he is doing well overall. Has completely weaned off Nexium and Dexilant for the last 2-3 weeks. Reports that he is only experiencing occasional heartburn when eating \"trigger\" foods. Typically spicy food. Has been taking Pepcid and or tums prn only. Reports prior to surgery, even with medication, he was experiencing constant burning in chest. This has resolved. Encouraged to try to avoid trigger foods. Complains of minimal dysphagia- only if eating too fast or too much. Denies any dysphagia is eating as advised. Able to tolerate fluids. Able to tolerate solid foods. Reminded to chew well and taking plenty of time to eat meals. Encouraged to eat a bite of food every 1-2 hours while awake to exercise the device, and keep it mobile, to prevent long term problems with swallowing. Intermittent constipation. Taking OTC stool softener prn. No nausea or emesis. No fever or chills. Denies sx of dehydration. Denies CP/SOB. No Abdominal pain. Current BMI: Body mass index is 26.47 kg/m².   Current Weight:   Wt Readings from Last 3 Encounters:   05/13/21 169 lb (76.7 kg)   04/07/21 164 lb (74.4 kg)   03/23/21 167 lb (75.8 kg)         Past Medical History:  Past Medical History:   Diagnosis Date    Allergic     Allergic rhinitis     Arthritis     Back pain     Depression     GERD (gastroesophageal reflux disease)     Hyperlipidemia     Prolonged emergence from general anesthesia     Prostatism     Unspecified sleep apnea     has CPAP       Past Surgical History:  Past Surgical History:   Procedure Laterality Date    CARDIAC CATHETERIZATION      COLONOSCOPY  2011  ESOPHAGEAL PH MONITORING N/A 2/16/2021    ESOPHAGEAL PH MONITORING (NO EGD) performed by Mariya Alamo MD at [address] GASTRIC FUNDOPLICATION N/A 8/94/8139    ROBOTIC LINX, Hiatal hernia repair, Umbilical hernia repair.  performed by Liza Barriga MD at [address] [facility]      tendonitus repair     RETINAL DETACHMENT SURGERY      UPPER GASTROINTESTINAL ENDOSCOPY N/A 1/15/2021    BRAVO performed by Jolene Alvares MD at [address] [facility]    UPPER GASTROINTESTINAL ENDOSCOPY  1/15/2021    EGD BIOPSY performed by Jolene Alvares MD at [address] [facility]       Past Social History:  Social History     Socioeconomic History    Marital status:      Spouse name: Not on file    Number of children: Not on file    Years of education: Not on file    Highest education level: Not on file   Occupational History    Not on file   Social Needs    Financial resource strain: Not on file    Food insecurity     Worry: Not on file     Inability: Not on file    Transportation needs     Medical: Not on file     Non-medical: Not on file   Tobacco Use    Smoking status: Never Smoker    Smokeless tobacco: Never Used   Substance and Sexual Activity    Alcohol use: Not Currently     Alcohol/week: 0.0 standard drinks    Drug use: No    Sexual activity: Yes     Partners: Female   Lifestyle    Physical activity     Days per week: Not on file     Minutes per session: Not on file    Stress: Not on file   Relationships    Social connections     Talks on phone: Not on file     Gets together: Not on file     Attends [employer]ism service: Not on file     Active member of club or organization: Not on file     Attends meetings of clubs or organizations: Not on file     Relationship status: Not on file    Intimate partner violence     Fear of current or ex partner: Not on file     Emotionally abused: Not on file     Physically abused: Not on file     Forced sexual activity: Not on file Other Topics Concern    Not on file   Social History Narrative    Not on file        Medications:   Current Outpatient Medications   Medication Sig Dispense Refill    tamsulosin (FLOMAX) 0.4 MG capsule TAKE 2 CAPSULES BY MOUTH ONE TIME A DAY  180 capsule 3    atorvastatin (LIPITOR) 10 MG tablet TAKE 1 TABLET BY MOUTH EVERY DAY 90 tablet 3    Coenzyme Q10 (COQ-10) 100 MG CAPS Take by mouth      buPROPion (WELLBUTRIN XL) 150 MG extended release tablet TAKE 1 TABLET BY MOUTH IN THE MORNING  90 tablet 0    Melatonin 10 MG TABS Take 1 tablet by mouth nightly as needed       Eszopiclone (LUNESTA PO) Take by mouth as needed 5-10 mg      Calcium Carbonate Antacid 1000 MG CHEW Take by mouth nightly       Probiotic Product (PROBIOTIC PO) Take by mouth daily       tadalafil (CIALIS) 5 MG tablet Take 1 tablet by mouth as needed for Erectile Dysfunction 30 tablet 0    traZODone (DESYREL) 50 MG tablet TAKE 1/2-1 TABLET BY MOUTH AT BEDTIME 30 tablet 5    fluticasone (FLONASE) 50 MCG/ACT nasal spray 2 sprays by Nasal route daily. 3 Bottle 3    loratadine (CLARITIN) 10 MG tablet Take 10 mg by mouth daily       aspirin 81 MG EC tablet Take 81 mg by mouth daily.  therapeutic multivitamin-minerals (THERAGRAN-M) tablet Take 1 tablet by mouth daily. occasionally      dexlansoprazole (DEXILANT) 60 MG CPDR delayed release capsule Take 1 capsule by mouth every evening      Plant Sterol Stanol-Pantethine (CHOLESTOFF COMPLETE) 300-100 MG CAPS daily       fish oil-omega-3 fatty acids 1000 MG capsule Take 1 g by mouth daily        No current facility-administered medications for this visit. Allergies: Allergies   Allergen Reactions    Seasonal      hayfever per pt. Subjective:    Review of Systems:  Constitutional: Denies any fever, chills, fatigue. Wound: Denies any rash, skin color changes or wound problems. Resp: Denies any cough, shortness of breath. CV: Denies any chest pain, orthopnea or syncope. 08/03/2020    BILITOT 1.0 08/03/2020    ALT 19 08/03/2020        PREALBUMIN   No results found for: PREALBUMIN     Assessment:       Diagnosis Orders   1. Presence of functional implant-linx device     2. History of repair of hiatal hernia     3. S/P umbilical hernia repair, follow-up exam     4. Gastroesophageal reflux disease without esophagitis       Plan:     Stay well hydrated. Drink a minimum of 64 oz of water daily to avoid dehydration   Nutritional education occurred during visit. Tolerating diet. Continue following  with dietitian and follow their recommendations.  Eat soft/ solid foods frequently. Recommended to eat one tablespoon of yogurt, pudding, applesauce or one cracker every hour while awake to help prevent dysphagia.  Reminded to cut foods into small pieces, eat slowly and chew thoroughly.  Avoid carbonated drinks    Signs and symptoms reviewed with patient that would be concerning and need her to return to office for re-evaluation. Patient will call if any questions or concerns arrise.  Encouraged to avoid trigger foods as does not want to resume daily acid medication. Will continue Pepcid or Tums prn. Call if sx progress or worsen. Return in about 6 weeks (around 6/24/2021) for postop follow up. I spent over 15 minutes with the patient, with greater that 50% of that time spent on education, counseling and coordination of care.     Electronically signed by BAILEY Anderson on 5/13/2021 at 3:57 PM

## 2021-05-13 NOTE — PATIENT INSTRUCTIONS
 Stay well hydrated. Drink a minimum of 64 oz of water daily to avoid dehydration   Nutritional education occurred during visit. Tolerating diet. Continue following  with dietitian and follow their recommendations.  Eat soft/ solid foods frequently. Recommended to eat one tablespoon of yogurt, pudding, applesauce or one cracker every hour while awake to help prevent dysphagia.  Reminded to cut foods into small pieces, eat slowly and chew thoroughly.  Avoid carbonated drinks    Signs and symptoms reviewed with patient that would be concerning and need her to return to office for re-evaluation. Patient will call if any questions or concerns arrise.  Encouraged to avoid trigger foods as does not want to resume daily acid medication. Will continue Pepcid or Tums prn. Call if sx progress or worsen.

## 2021-06-12 NOTE — PROGRESS NOTES
[facility]  [facility]  Dept: [phone]  Dept Fax: [phone]    SOL Montes is a 76 y.o.male    Pt presents for follow up of Hyperlipidemia, Adjustment Disorder with Depressed mood, BPH, GERD, SUNIL, Allergic Rhinitis, and ED. Pt feeling ok since last visit- interval history and any new issues noted below:     Pt underwent LINX device implantation per Dr. Mary Granado on 3/15/2021. Pt states she was doing well (restarted antacids ~ 8-9 weeks after procedure, then added Nexium again, and recently added Dexilant in addition ~ 10 days ago)- some improvement over last several days. To have Barium swallow next week and follow up with Dr. Mary Granado next week). Glucometer readings at home are not needed. The home BP readings have not been checked recently. Sleep-OK, using Melatonin and PRN Lunesta. Interest- Normal  Guilt- OK  Energy- OK  Concentration- OK  Appetite- OK  Psychomotor Retardation- NO  Suicidal/ Homicidal Ideations- NO  Anxiety-occasional   Libido- OK  Employer? Lost work days?- [employer]- self- employed. Pt would like to hold on med changes at this time and let us know if worsening symptoms. Wt Readings from Last 3 Encounters:   06/17/21 168 lb 3.2 oz (76.3 kg)   05/13/21 169 lb (76.7 kg)   04/07/21 164 lb (74.4 kg)   Weight decreased 5# since last visit 10 months ago.      Patient Active Problem List   Diagnosis    Hyperlipidemia    GERD (gastroesophageal reflux disease)    Chronic allergic rhinitis    Benign non-nodular prostatic hyperplasia with lower urinary tract symptoms    IFG (impaired fasting glucose)    Obstructive sleep apnea on CPAP    Insomnia    Erectile dysfunction    Adjustment disorder with depressed mood    Hiatal hernia       Current Outpatient Medications   Medication Sig Dispense Refill    Esomeprazole Magnesium (NEXIUM PO) Take 40 mg by mouth every morning (before breakfast)       tadalafil (CIALIS) 5 MG tablet Take 1 tablet by mouth as needed for Erectile Dysfunction 30 tablet 2    eszopiclone (LUNESTA) 1 MG TABS Take 1 tablet by mouth nightly as needed (insomnia) for up to 30 days. 30 tablet 0    tamsulosin (FLOMAX) 0.4 MG capsule TAKE 2 CAPSULES BY MOUTH ONE TIME A DAY  180 capsule 3    atorvastatin (LIPITOR) 10 MG tablet TAKE 1 TABLET BY MOUTH EVERY DAY 90 tablet 3    Coenzyme Q10 (COQ-10) 100 MG CAPS Take by mouth      buPROPion (WELLBUTRIN XL) 150 MG extended release tablet TAKE 1 TABLET BY MOUTH IN THE MORNING  90 tablet 0    Melatonin 10 MG TABS Take 1 tablet by mouth nightly as needed       Calcium Carbonate Antacid 1000 MG CHEW Take by mouth nightly       Probiotic Product (PROBIOTIC PO) Take by mouth daily       dexlansoprazole (DEXILANT) 60 MG CPDR delayed release capsule Take 1 capsule by mouth every evening      fluticasone (FLONASE) 50 MCG/ACT nasal spray 2 sprays by Nasal route daily. 3 Bottle 3    loratadine (CLARITIN) 10 MG tablet Take 10 mg by mouth daily       aspirin 81 MG EC tablet Take 81 mg by mouth daily.  therapeutic multivitamin-minerals (THERAGRAN-M) tablet Take 1 tablet by mouth daily. occasionally       No current facility-administered medications for this visit. Review of Systems   Constitutional: Negative for chills, diaphoresis, fatigue, fever and unexpected weight change. Eyes: Negative for visual disturbance. Respiratory: Negative for chest tightness and shortness of breath. Cardiovascular: Negative for chest pain, palpitations and leg swelling. Gastrointestinal: Positive for constipation and diarrhea (usually following meds for constipation. ). Negative for abdominal pain, anal bleeding, blood in stool, nausea and vomiting. Genitourinary: Negative for dysuria and hematuria. Musculoskeletal: Negative for neck pain. Neurological: Negative for dizziness, light-headedness and headaches.      OBJECTIVE     /78   Pulse 64   Temp of injury. Normal range of motion. Cervical back: Normal range of motion. Skin:     General: Skin is warm and dry. Coloration: Skin is not jaundiced or pale. Findings: No bruising, erythema, lesion or rash. Neurological:      General: No focal deficit present. Mental Status: He is alert and oriented to person, place, and time. Mental status is at baseline. Motor: No weakness. Coordination: Coordination normal.      Gait: Gait normal.   Psychiatric:         Mood and Affect: Mood normal.         Behavior: Behavior normal.         Thought Content: Thought content normal.         Judgment: Judgment normal.         No results found for this visit on 06/17/21. Lab Results   Component Value Date    LABA1C 5.6 09/26/2020       Lab Results   Component Value Date    CHOL 113 (L) 08/03/2020    TRIG 122 08/03/2020    HDL 39 (L) 08/03/2020    LDLCALC 50 08/03/2020    LDLDIRECT 91 04/09/2013       The ASCVD Risk score (Cassius Busby et al., 2013) failed to calculate for the following reasons:     The valid total cholesterol range is 130 to 320 mg/dL    Lab Results   Component Value Date     03/01/2021    K 4.4 03/01/2021     03/01/2021    CO2 32 03/01/2021    BUN 18 03/01/2021    CREATININE 1.1 03/01/2021    GLUCOSE 102 03/01/2021    CALCIUM 9.5 03/01/2021    PROT 7.0 08/03/2020    LABALBU 4.5 08/03/2020    BILITOT 1.0 08/03/2020    ALKPHOS 91 08/03/2020    AST 18 08/03/2020    ALT 19 08/03/2020    LABGLOM 67 (A) 03/01/2021       No results found for: LABMICR, [id number]    Lab Results   Component Value Date    TSH 2.88 01/03/2020    T4FREE 0.81 05/24/2016       Lab Results   Component Value Date    WBC 6.1 03/01/2021    HGB 15.1 03/01/2021    HCT 45.7 03/01/2021    MCV 94.0 03/01/2021     03/01/2021     Component      Latest Ref Rng & Units 1/8/2021 9/26/2020 8/3/2020 4/3/2019          11:07 AM 11:45 AM 12:12 PM  8:13 AM   Prostatic Specific Ag      ng/mL       PSA, Ultrasensitive      0.00 - 4.00 ng/mL 2.36 2.35 2.96 1.41     Component      Latest Ref Rng & Units 4/2/2018 3/31/2017 5/24/2016 1/27/2015           8:10 AM  7:30 AM  8:25 AM  8:20 AM   Prostatic Specific Ag      ng/mL       PSA, Ultrasensitive      0.00 - 4.00 ng/mL 1.150 0.97 1.14 1.00     Component      Latest Ref Rng & Units 1/29/2014          12:00 AM   Prostatic Specific Ag      ng/mL 0.98   PSA, Ultrasensitive      0.00 - 4.00 ng/mL          Immunization History   Administered Date(s) Administered    COVID-19, Moderna, PF, 100mcg/0.5mL 02/23/2021, 03/23/2021    Pneumococcal Polysaccharide ([id number]) 08/11/2020    Tdap (Boostrix, Adacel) 07/25/2013    Zoster Live (Zostavax) 01/25/2013    Zoster Recombinant (Shingrix) 07/09/2019, 10/14/2019       Health Maintenance   Topic Date Due    Annual Wellness Visit (AWV)  Never done    Colon cancer screen colonoscopy  04/08/2021    Lipid screen  08/03/2021    Flu vaccine (Season Ended) 09/01/2021    A1C test (Diabetic or Prediabetic)  09/26/2021    DTaP/Tdap/Td vaccine (2 - Td or Tdap) 07/25/2023    Shingles Vaccine  Completed    Pneumococcal 65+ years Vaccine  Completed    COVID-19 Vaccine  Completed    Hepatitis A vaccine  Aged Out    Hepatitis B vaccine  Aged Out    Hib vaccine  Aged Out    Meningococcal (ACWY) vaccine  Aged Out    Hepatitis C screen  Discontinued       AAA ultrasound (Male, 65-75, smoked ever) indicated at this time? NO tobacco history  CT Lung Screen (50-80, 20 pk-yrs, smoking or quit <15 years) indicated at this time? NO tobacco history  Sleep Medicine referral indicated at this time (Obesity, Snoring, Daytime Somnolence, Apneic Episodes)?  Known SUNIL on CPAP    Future Appointments   Date Time Provider Jenelle Haney   6/21/2021 10:00 AM STR FLUORO ROOM 4 STRZ RAD STR Radiolog   6/24/2021  2:00 PM BAILEY Wilder N SRPX WT [facility] [place]   6/30/2021  9:15 AM Gloria Thomas

## 2021-06-15 ENCOUNTER — TELEPHONE (OUTPATIENT)
Dept: BARIATRICS/WEIGHT MGMT | Age: 66
End: 2021-06-15

## 2021-06-15 DIAGNOSIS — K21.9 GASTROESOPHAGEAL REFLUX DISEASE WITHOUT ESOPHAGITIS: Primary | ICD-10-CM

## 2021-06-15 NOTE — TELEPHONE ENCOUNTER
Per Brooke Ernandez, an esophagram with marshmallow/bagel food challenge was ordered and I scheduled patient for June 21 @ 10:00 a.m. .  Called patient to let him know of this appointment time etc.  Gave all instructions/directions. Patient agreed and voiced understanding.

## 2021-06-17 ENCOUNTER — OFFICE VISIT (OUTPATIENT)
Dept: FAMILY MEDICINE CLINIC | Age: 66
End: 2021-06-17
Payer: MEDICARE

## 2021-06-17 VITALS
RESPIRATION RATE: 16 BRPM | DIASTOLIC BLOOD PRESSURE: 78 MMHG | SYSTOLIC BLOOD PRESSURE: 124 MMHG | TEMPERATURE: 97.9 F | WEIGHT: 168.2 LBS | HEART RATE: 64 BPM | BODY MASS INDEX: 26.34 KG/M2

## 2021-06-17 DIAGNOSIS — R53.83 OTHER FATIGUE: ICD-10-CM

## 2021-06-17 DIAGNOSIS — Z12.5 SCREENING PSA (PROSTATE SPECIFIC ANTIGEN): ICD-10-CM

## 2021-06-17 DIAGNOSIS — N40.1 BENIGN NON-NODULAR PROSTATIC HYPERPLASIA WITH LOWER URINARY TRACT SYMPTOMS: ICD-10-CM

## 2021-06-17 DIAGNOSIS — R73.01 IFG (IMPAIRED FASTING GLUCOSE): Primary | ICD-10-CM

## 2021-06-17 DIAGNOSIS — F43.21 ADJUSTMENT DISORDER WITH DEPRESSED MOOD: ICD-10-CM

## 2021-06-17 DIAGNOSIS — G47.33 OBSTRUCTIVE SLEEP APNEA ON CPAP: ICD-10-CM

## 2021-06-17 DIAGNOSIS — N52.9 ERECTILE DYSFUNCTION, UNSPECIFIED ERECTILE DYSFUNCTION TYPE: ICD-10-CM

## 2021-06-17 DIAGNOSIS — G47.09 OTHER INSOMNIA: ICD-10-CM

## 2021-06-17 DIAGNOSIS — Z99.89 OBSTRUCTIVE SLEEP APNEA ON CPAP: ICD-10-CM

## 2021-06-17 DIAGNOSIS — K21.9 GASTROESOPHAGEAL REFLUX DISEASE, UNSPECIFIED WHETHER ESOPHAGITIS PRESENT: ICD-10-CM

## 2021-06-17 DIAGNOSIS — E78.5 HYPERLIPIDEMIA, UNSPECIFIED HYPERLIPIDEMIA TYPE: ICD-10-CM

## 2021-06-17 PROCEDURE — 4040F PNEUMOC VAC/ADMIN/RCVD: CPT | Performed by: FAMILY MEDICINE

## 2021-06-17 PROCEDURE — 1036F TOBACCO NON-USER: CPT | Performed by: FAMILY MEDICINE

## 2021-06-17 PROCEDURE — 1123F ACP DISCUSS/DSCN MKR DOCD: CPT | Performed by: FAMILY MEDICINE

## 2021-06-17 PROCEDURE — 3017F COLORECTAL CA SCREEN DOC REV: CPT | Performed by: FAMILY MEDICINE

## 2021-06-17 PROCEDURE — G8427 DOCREV CUR MEDS BY ELIG CLIN: HCPCS | Performed by: FAMILY MEDICINE

## 2021-06-17 PROCEDURE — 99214 OFFICE O/P EST MOD 30 MIN: CPT | Performed by: FAMILY MEDICINE

## 2021-06-17 PROCEDURE — G8417 CALC BMI ABV UP PARAM F/U: HCPCS | Performed by: FAMILY MEDICINE

## 2021-06-17 RX ORDER — ESZOPICLONE 1 MG/1
1 TABLET, FILM COATED ORAL NIGHTLY PRN
Qty: 30 TABLET | Refills: 0 | Status: SHIPPED | OUTPATIENT
Start: 2021-06-17 | End: 2022-02-07 | Stop reason: SDUPTHER

## 2021-06-17 RX ORDER — TADALAFIL 5 MG/1
5 TABLET ORAL PRN
Qty: 30 TABLET | Refills: 2 | Status: ON HOLD | OUTPATIENT
Start: 2021-06-17 | End: 2022-10-13 | Stop reason: SDUPTHER

## 2021-06-17 ASSESSMENT — ENCOUNTER SYMPTOMS
NAUSEA: 0
CONSTIPATION: 1
DIARRHEA: 1
CHEST TIGHTNESS: 0
ABDOMINAL PAIN: 0
BLOOD IN STOOL: 0
VOMITING: 0
ANAL BLEEDING: 0
SHORTNESS OF BREATH: 0

## 2021-06-17 NOTE — PATIENT INSTRUCTIONS
Continue current medicines   Refills, including Lunesta. Check FLP, CMP, TSH/ FREE T4, PSA, and VIT D 25 OH after 8/3/2021  Follow up in 4-6 months for MAW and follow up with WS/ TS  Pt to let me know if desires PT for left SI Dysfunction in future. Follow up in 12 months with me. Preventive Health Topics:  Encouraged annual FLU SHOT- pt declines.  (updated 6/17/2021)  COLONOSCOPY done 4/8/2016 per Dr. Lesly Rivera- to do again in 2026. (updated 6/17/2021)

## 2021-06-28 ENCOUNTER — HOSPITAL ENCOUNTER (OUTPATIENT)
Dept: GENERAL RADIOLOGY | Age: 66
Discharge: HOME | End: 2021-06-28
Payer: MEDICARE

## 2021-06-28 DIAGNOSIS — K21.9 GASTROESOPHAGEAL REFLUX DISEASE WITHOUT ESOPHAGITIS: ICD-10-CM

## 2021-06-28 PROCEDURE — 6360000004 HC RX CONTRAST MEDICATION: Performed by: SURGERY

## 2021-06-28 PROCEDURE — 74220 X-RAY XM ESOPHAGUS 1CNTRST: CPT

## 2021-06-28 PROCEDURE — A4641 RADIOPHARM DX AGENT NOC: HCPCS | Performed by: SURGERY

## 2021-06-28 PROCEDURE — 6370000000 HC RX 637 (ALT 250 FOR IP): Performed by: SURGERY

## 2021-06-28 PROCEDURE — 2500000003 HC RX 250 WO HCPCS: Performed by: SURGERY

## 2021-06-28 RX ADMIN — BARIUM SULFATE 70 ML: 0.6 SUSPENSION ORAL at 09:37

## 2021-06-28 RX ADMIN — BARIUM SULFATE 140 ML: 980 POWDER, FOR SUSPENSION ORAL at 09:37

## 2021-06-28 RX ADMIN — ANTACID/ANTIFLATULENT 1 EACH: 380; 550; 10; 10 GRANULE, EFFERVESCENT ORAL at 09:37

## 2021-06-29 NOTE — PROGRESS NOTES
Negative. Negative for arthralgias and back pain. Skin: Negative. Allergic/Immunologic: Negative. Neurological: Negative. Negative for dizziness and light-headedness. Psychiatric/Behavioral: Negative. All other systems reviewed and are negative. Physical Exam:    BMI:  Body mass index is 26.12 kg/m². Wt Readings from Last 3 Encounters:   06/30/21 166 lb 12.8 oz (75.7 kg)   06/17/21 168 lb 3.2 oz (76.3 kg)   05/13/21 169 lb (76.7 kg)     Weight stable / unchanged  Vitals: /78 (Site: Right Upper Arm, Position: Sitting, Cuff Size: Large Adult)   Pulse 60   Temp 97.7 °F (36.5 °C) (Temporal)   Ht 5' 7\" (1.702 m)   Wt 166 lb 12.8 oz (75.7 kg)   SpO2 99% Comment: Room air at rest  BMI 26.12 kg/m²       Physical Exam  Constitutional:       Appearance: He is well-developed. HENT:      Head: Normocephalic and atraumatic. Right Ear: External ear normal.      Left Ear: External ear normal.   Eyes:      Conjunctiva/sclera: Conjunctivae normal.      Pupils: Pupils are equal, round, and reactive to light. Cardiovascular:      Rate and Rhythm: Normal rate and regular rhythm. Heart sounds: Normal heart sounds. Pulmonary:      Effort: Pulmonary effort is normal.      Breath sounds: Normal breath sounds. Musculoskeletal:         General: Normal range of motion. Cervical back: Normal range of motion and neck supple. Skin:     General: Skin is warm and dry. Neurological:      Mental Status: He is alert and oriented to person, place, and time. Psychiatric:         Behavior: Behavior normal.         Thought Content: Thought content normal.         Judgment: Judgment normal.           ASSESSMENT/DIAGNOSIS     Diagnosis Orders   1. SUNIL on CPAP     2. Other insomnia            Plan   Do you need any equipment today? Yes  - Discussed the recall- he needs a new PAP  - Will see if his PAP is involved in recall- otherwise will get a new PAP ordered.    - Download reviewed and

## 2021-06-30 ENCOUNTER — OFFICE VISIT (OUTPATIENT)
Dept: PULMONOLOGY | Age: 66
End: 2021-06-30
Payer: MEDICARE

## 2021-06-30 ENCOUNTER — TELEPHONE (OUTPATIENT)
Dept: PULMONOLOGY | Age: 66
End: 2021-06-30

## 2021-06-30 VITALS
SYSTOLIC BLOOD PRESSURE: 124 MMHG | WEIGHT: 166.8 LBS | OXYGEN SATURATION: 99 % | DIASTOLIC BLOOD PRESSURE: 78 MMHG | HEART RATE: 60 BPM | TEMPERATURE: 97.7 F | BODY MASS INDEX: 26.18 KG/M2 | HEIGHT: 67 IN

## 2021-06-30 DIAGNOSIS — G47.33 OSA ON CPAP: Primary | ICD-10-CM

## 2021-06-30 DIAGNOSIS — Z99.89 OSA ON CPAP: Primary | ICD-10-CM

## 2021-06-30 DIAGNOSIS — G47.09 OTHER INSOMNIA: ICD-10-CM

## 2021-06-30 PROCEDURE — 99213 OFFICE O/P EST LOW 20 MIN: CPT | Performed by: PHYSICIAN ASSISTANT

## 2021-06-30 PROCEDURE — G8427 DOCREV CUR MEDS BY ELIG CLIN: HCPCS | Performed by: PHYSICIAN ASSISTANT

## 2021-06-30 PROCEDURE — G8417 CALC BMI ABV UP PARAM F/U: HCPCS | Performed by: PHYSICIAN ASSISTANT

## 2021-06-30 PROCEDURE — 1036F TOBACCO NON-USER: CPT | Performed by: PHYSICIAN ASSISTANT

## 2021-06-30 PROCEDURE — 4040F PNEUMOC VAC/ADMIN/RCVD: CPT | Performed by: PHYSICIAN ASSISTANT

## 2021-06-30 PROCEDURE — 1123F ACP DISCUSS/DSCN MKR DOCD: CPT | Performed by: PHYSICIAN ASSISTANT

## 2021-06-30 PROCEDURE — 3017F COLORECTAL CA SCREEN DOC REV: CPT | Performed by: PHYSICIAN ASSISTANT

## 2021-06-30 ASSESSMENT — ENCOUNTER SYMPTOMS
EYES NEGATIVE: 1
SHORTNESS OF BREATH: 0
DIARRHEA: 0
BACK PAIN: 0
NAUSEA: 0
COUGH: 0
ALLERGIC/IMMUNOLOGIC NEGATIVE: 1
CHEST TIGHTNESS: 0
WHEEZING: 0
STRIDOR: 0

## 2021-06-30 NOTE — TELEPHONE ENCOUNTER
Patient called and lvm for our office stated you  Discussed the recall that he  needs a new PAP and Will see if his PAP is involved in recall- which it is but it will be a  drawn out process. Medicare should pay for a new machine and if he could get that order to get the process started. Please advise [phone].

## 2021-07-01 DIAGNOSIS — Z99.89 OSA ON CPAP: Primary | ICD-10-CM

## 2021-07-01 DIAGNOSIS — G47.33 OSA ON CPAP: Primary | ICD-10-CM

## 2021-07-09 ENCOUNTER — INITIAL CONSULT (OUTPATIENT)
Dept: BARIATRICS/WEIGHT MGMT | Age: 66
End: 2021-07-09
Payer: MEDICARE

## 2021-07-09 VITALS
RESPIRATION RATE: 18 BRPM | WEIGHT: 168 LBS | BODY MASS INDEX: 26.37 KG/M2 | SYSTOLIC BLOOD PRESSURE: 128 MMHG | HEIGHT: 67 IN | TEMPERATURE: 97.8 F | DIASTOLIC BLOOD PRESSURE: 86 MMHG | HEART RATE: 72 BPM

## 2021-07-09 DIAGNOSIS — K21.9 GASTROESOPHAGEAL REFLUX DISEASE WITHOUT ESOPHAGITIS: Primary | ICD-10-CM

## 2021-07-09 DIAGNOSIS — Z98.890 HISTORY OF REPAIR OF HIATAL HERNIA: ICD-10-CM

## 2021-07-09 DIAGNOSIS — Z87.19 HISTORY OF REPAIR OF HIATAL HERNIA: ICD-10-CM

## 2021-07-09 PROCEDURE — 4040F PNEUMOC VAC/ADMIN/RCVD: CPT | Performed by: SURGERY

## 2021-07-09 PROCEDURE — 1123F ACP DISCUSS/DSCN MKR DOCD: CPT | Performed by: SURGERY

## 2021-07-09 PROCEDURE — 1036F TOBACCO NON-USER: CPT | Performed by: SURGERY

## 2021-07-09 PROCEDURE — G8417 CALC BMI ABV UP PARAM F/U: HCPCS | Performed by: SURGERY

## 2021-07-09 PROCEDURE — 3017F COLORECTAL CA SCREEN DOC REV: CPT | Performed by: SURGERY

## 2021-07-09 PROCEDURE — 99213 OFFICE O/P EST LOW 20 MIN: CPT | Performed by: SURGERY

## 2021-07-09 PROCEDURE — G8427 DOCREV CUR MEDS BY ELIG CLIN: HCPCS | Performed by: SURGERY

## 2021-07-09 RX ORDER — FAMOTIDINE 10 MG
10 TABLET ORAL 2 TIMES DAILY PRN
Status: ON HOLD | COMMUNITY
End: 2022-02-17

## 2021-07-11 ASSESSMENT — ENCOUNTER SYMPTOMS
CHEST TIGHTNESS: 0
ABDOMINAL DISTENTION: 0
EYE DISCHARGE: 0
NAUSEA: 0
BLOOD IN STOOL: 0
EYE ITCHING: 0
RECTAL PAIN: 0
EYE PAIN: 0
BACK PAIN: 0
CONSTIPATION: 0
RHINORRHEA: 0
PHOTOPHOBIA: 0
DIARRHEA: 0
EYE REDNESS: 0
CHOKING: 0
VOICE CHANGE: 0
FACIAL SWELLING: 0
SORE THROAT: 0
SHORTNESS OF BREATH: 0
STRIDOR: 0
WHEEZING: 0
ANAL BLEEDING: 0
ABDOMINAL PAIN: 0
TROUBLE SWALLOWING: 0
COLOR CHANGE: 0
SINUS PRESSURE: 0
APNEA: 0
VOMITING: 0
COUGH: 0

## 2021-07-11 NOTE — PROGRESS NOTES
Marianna Yadav (:  1955)     ASSESSMENT:  1. Recurrent GERD  2. Status post robotic hiatal hernia repair with magnetic sphincter augmentation (Linx #95); umbilical hernia repair (March 15, 2021)    PLAN:  1. Recommended upper endoscopy with possible dilation if needed. At that time pH probe insertion if possible. 2.  Dietary modifications/restrictions discussed with patient. Patient acknowledges he has had a diet consisting of fried, fatty and large portion sized meals. Even though patient has magnetic sphincter augmentation insertion for heartburn control and hiatal hernia repair still concern for dietary management is of need. 3.  Patient has resumed PPI/antacid regimen. Continue with Nexium and Dexilant at this time. 4.  Encourage patient to stay well-hydrated. Still trying to avoid some of the trigger liquids would be of need. Encouraged to avoid carbonation and alcoholic beverages if possible. 5.  Follow-up after upper endoscopy and repeat pH imaging/testing. 6.  Signs and symptoms reviewed with patient that would be concerning and need him to return to office for re-evaluation. Patient states He will call if He has questions or concerns. SUBJECTIVE/OBJECTIVE:    Chief Complaint   Patient presents with    Follow-up     4 month pos top LINX      HPI  Eddi Reinoso is a 25-year-old male who presents for follow-up due to new GERD. Patient had a long history of GERD and underwent hiatal hernia repair with magnetic sphincter augmentation insertion (Linx #14) March 15, 2021. He states postoperatively he did have some dysphagia but then this started to improve. Initially he had several week history of resolution of his symptoms. Recently symptoms have come back. Started with Pepcid and Tums but is now back to taking his Dexilant and Nexium. He admits he has had poor diet. He has eaten fried fish. Large meals. Alcohol.   He is concerned because he is not able to eat these meals and he is getting his heartburn back now. Denies any generalized abdominal pain. Most of the discomfort is after the meals and then also lying down. No urinary complaints. Normal bowel function. No hematochezia or melena. No issues with staying well-hydrated. No dysphagia. Patient is already underwent a postoperative esophagram/upper GI with marshmallow/bagel challenge. No reflux demonstrated. No significant stricture. Upper GI looks good. Review of Systems   Constitutional: Negative for activity change, appetite change, chills, diaphoresis, fatigue, fever and unexpected weight change. HENT: Negative for congestion, dental problem, drooling, ear discharge, ear pain, facial swelling, hearing loss, mouth sores, nosebleeds, postnasal drip, rhinorrhea, sinus pressure, sneezing, sore throat, tinnitus, trouble swallowing and voice change. Eyes: Negative for photophobia, pain, discharge, redness, itching and visual disturbance. Respiratory: Negative for apnea, cough, choking, chest tightness, shortness of breath, wheezing and stridor. Cardiovascular: Negative for chest pain, palpitations and leg swelling. Gastrointestinal: Negative for abdominal distention, abdominal pain, anal bleeding, blood in stool, constipation, diarrhea, nausea, rectal pain and vomiting. Endocrine: Negative. Genitourinary: Positive for urgency. Negative for decreased urine volume, difficulty urinating, discharge, dysuria, enuresis, flank pain, frequency, genital sores, hematuria, penile pain, penile swelling, scrotal swelling and testicular pain. Musculoskeletal: Negative for arthralgias, back pain, gait problem, joint swelling, myalgias, neck pain and neck stiffness. Skin: Negative for color change, pallor, rash and wound. Allergic/Immunologic: Positive for environmental allergies. Negative for food allergies and immunocompromised state.    Neurological: Negative for dizziness, tremors, seizures, syncope, facial asymmetry, speech difficulty, weakness, light-headedness, numbness and headaches. Hematological: Negative for adenopathy. Does not bruise/bleed easily. Psychiatric/Behavioral: Positive for sleep disturbance. Negative for agitation, behavioral problems, confusion, decreased concentration, dysphoric mood, hallucinations, self-injury and suicidal ideas. The patient is not nervous/anxious and is not hyperactive. Past Medical History:   Diagnosis Date    Allergic     Allergic rhinitis     Arthritis     Back pain     Depression     GERD (gastroesophageal reflux disease)     Hyperlipidemia     Prolonged emergence from general anesthesia     Prostatism     Unspecified sleep apnea     has CPAP       Past Surgical History:   Procedure Laterality Date    CARDIAC CATHETERIZATION      COLONOSCOPY  2011    ESOPHAGEAL [facility] MONITORING N/A 2/16/2021    ESOPHAGEAL PH MONITORING (NO EGD) performed by Marilyn Vyas MD at [address] GASTRIC FUNDOPLICATION N/A 3/52/7567    ROBOTIC LINX, Hiatal hernia repair, Umbilical hernia repair. performed by Tanvir Lee MD at [facility]. 12.      tendonitus repair     RETINAL DETACHMENT SURGERY      UPPER GASTROINTESTINAL ENDOSCOPY N/A 1/15/2021    RANDALL performed by Sushma Powers MD at [address] [place] [address]  1/15/2021    EGD BIOPSY performed by Sushma Powers MD at [facility]       Current Outpatient Medications   Medication Sig Dispense Refill    famotidine (PEPCID) 10 MG tablet Take 10 mg by mouth 2 times daily as needed      Esomeprazole Magnesium (NEXIUM PO) Take 40 mg by mouth every morning (before breakfast)       tadalafil (CIALIS) 5 MG tablet Take 1 tablet by mouth as needed for Erectile Dysfunction 30 tablet 2    eszopiclone (LUNESTA) 1 MG TABS Take 1 tablet by mouth nightly as needed (insomnia) for up to 30 days.  30 tablet 0    tamsulosin (FLOMAX) 0.4 MG capsule TAKE 2 CAPSULES BY MOUTH ONE TIME A DAY  180 capsule 3    atorvastatin (LIPITOR) 10 MG tablet TAKE 1 TABLET BY MOUTH EVERY DAY 90 tablet 3    Coenzyme Q10 (COQ-10) 100 MG CAPS Take by mouth      buPROPion (WELLBUTRIN XL) 150 MG extended release tablet TAKE 1 TABLET BY MOUTH IN THE MORNING  90 tablet 0    Melatonin 10 MG TABS Take 1 tablet by mouth nightly as needed       Calcium Carbonate Antacid 1000 MG CHEW Take by mouth nightly       Probiotic Product (PROBIOTIC PO) Take by mouth daily       dexlansoprazole (DEXILANT) 60 MG CPDR delayed release capsule Take 1 capsule by mouth every evening      fluticasone (FLONASE) 50 MCG/ACT nasal spray 2 sprays by Nasal route daily. 3 Bottle 3    loratadine (CLARITIN) 10 MG tablet Take 10 mg by mouth daily       aspirin 81 MG EC tablet Take 81 mg by mouth daily.  therapeutic multivitamin-minerals (THERAGRAN-M) tablet Take 1 tablet by mouth daily. occasionally       No current facility-administered medications for this visit. Allergies   Allergen Reactions    Seasonal      hayfever per pt.        Family History   Problem Relation Age of Onset    Arthritis Mother     Hearing Loss Mother     High Blood Pressure Mother     High Cholesterol Mother     Stroke Mother     Cancer Mother     Depression Father     Dementia Father     Arthritis Father     Diabetes Father     Birth Defects Sister     Diabetes Paternal Grandfather     High Blood Pressure Brother     Arthritis Brother     Other Brother         heart palpatations    Cancer Maternal Grandfather     Cancer Maternal Grandmother        Social History     Socioeconomic History    Marital status:      Spouse name: Not on file    Number of children: Not on file    Years of education: Not on file    Highest education level: Not on file   Occupational History    Not on file   Tobacco Use    Smoking status: Never Smoker    Smokeless tobacco: Never Used   Vaping Use    Vaping Use: Never used   Substance and Sexual Activity    Alcohol use: Not Currently     Alcohol/week: 0.0 standard drinks    Drug use: No    Sexual activity: Yes     Partners: Female   Other Topics Concern    Not on file   Social History Narrative    Not on file     Social Determinants of Health     Financial Resource Strain:     Difficulty of Paying Living Expenses:    Food Insecurity:     Worried About Running Out of Food in the Last Year:     [address] in the Last Year:    Transportation Needs:     Lack of Transportation (Medical):  Lack of Transportation (Non-Medical):    Physical Activity:     Days of Exercise per Week:     Minutes of Exercise per Session:    Stress:     Feeling of Stress :    Social Connections:     Frequency of Communication with Friends and Family:     Frequency of Social Gatherings with Friends and Family:     Attends Sabianism Services:     Active Member of Clubs or Organizations:     Attends Club or Organization Meetings:     Marital Status:    Intimate Partner Violence:     Fear of Current or Ex-Partner:     Emotionally Abused:     Physically Abused:     Sexually Abused:      Vitals:    07/09/21 0845   BP: 128/86   Site: Right Upper Arm   Position: Sitting   Cuff Size: Large Adult   Pulse: 72   Resp: 18   Temp: 97.8 °F (36.6 °C)   TempSrc: Oral   Weight: 168 lb (76.2 kg)   Height: 5' 7\" (1.702 m)     Body mass index is 26.31 kg/m². Wt Readings from Last 3 Encounters:   07/09/21 168 lb (76.2 kg)   06/30/21 166 lb 12.8 oz (75.7 kg)   06/17/21 168 lb 3.2 oz (76.3 kg)     Physical Exam  Vitals reviewed. Constitutional:       General: He is not in acute distress. Appearance: He is well-developed. He is not diaphoretic. HENT:      Head: Normocephalic and atraumatic. Right Ear: External ear normal.      Left Ear: External ear normal.      Nose: Nose normal.   Eyes:      General: No scleral icterus. Right eye: No discharge. Left eye: No discharge. Conjunctiva/sclera: Conjunctivae normal.   Cardiovascular:      Rate and Rhythm: Normal rate and regular rhythm. Heart sounds: Normal heart sounds. Pulmonary:      Effort: Pulmonary effort is normal. No respiratory distress. Breath sounds: Normal breath sounds. No wheezing or rales. Chest:      Chest wall: No tenderness. Abdominal:      General: Bowel sounds are normal. There is no distension. Palpations: Abdomen is soft. There is no mass. Tenderness: There is no abdominal tenderness. There is no guarding or rebound. Musculoskeletal:         General: No tenderness. Normal range of motion. Cervical back: Normal range of motion and neck supple. Skin:     General: Skin is warm and dry. Coloration: Skin is not pale. Findings: No erythema or rash. Neurological:      Mental Status: He is alert and oriented to person, place, and time. Cranial Nerves: No cranial nerve deficit. Psychiatric:         Behavior: Behavior normal.         Thought Content: Thought content normal.         Judgment: Judgment normal.       Lab Results   Component Value Date     03/01/2021    K 4.4 03/01/2021     03/01/2021    CO2 32 03/01/2021     Lab Results   Component Value Date    CREATININE 1.1 03/01/2021     Lab Results   Component Value Date    WBC 6.1 03/01/2021    HGB 15.1 03/01/2021    HCT 45.7 03/01/2021    MCV 94.0 03/01/2021     03/01/2021       Narrative   PROCEDURE: FL ESOPHAGRAM       CLINICAL INFORMATION: Gastroesophageal reflux, prior linx procedure       TECHNIQUE: Following the oral administration of barium, swallowing mechanism and anatomy of the esophagus were evaluated in a double contrast esophagram. Swallowing was also evaluated using a marshmallow and bagel. 14 images were obtained.  Total    fluoroscopy time 2.8 minutes.       COMPARISON: Esophagram 12/7/2020       FINDINGS: Swallowing mechanism is normal. No strictures or extrinsic abnormalities are identified in the esophagus. No mucosal lesions or ulcerations are seen. A linx band in the distal esophagus results in slight mechanical narrowing at the    gastroesophageal junction. No reflux or hiatal hernia is identified. Both a marshmallow and bagel passed through the esophagus within 2 peristaltic contractions.           Impression       Linx band at the gastroesophageal junction but otherwise unremarkable esophagram.       Final report electronically signed by Dr. Loren Eng on 6/28/2021 9:56 AM       An electronic signature was used to authenticate this note.     --Tanvir Lee MD

## 2021-07-26 NOTE — H&P
[facility]  Sedation/Analgesia History & Physical    Patient: [place]: 1955  Med Rec#: [id number] Acc#: [id number]   Provider Performing Procedure: Abdirizak Gonzalez MD  Primary Care Physician: Laina Marina MD    PRE-PROCEDURE   Brief History/Pre-Procedure Diagnosis:The patient is a 76 y.o.,  male with significant past medical history of GERD. He has been taking dexilant for symptom relief, which has been held for this procedure. He is followed by Dr. Mariola Purvis. He has had the LINX done in March, by Dr. Oliverio Londono. MEDICAL HISTORY  []CAD/Valve  []Liver Disease  []Lung Disease []Diabetes  []Hypertension []Renal Disease  [x]Additional information:         has a past medical history of Allergic, Allergic rhinitis, Arthritis, Back pain, Depression, GERD (gastroesophageal reflux disease), Hyperlipidemia, Prolonged emergence from general anesthesia, Prostatism, and Unspecified sleep apnea. SURGICAL HISTORY   has a past surgical history that includes Retinal detachment surgery; Colonoscopy (2011); eye surgery; other surgical history; Upper gastrointestinal endoscopy (N/A, 1/15/2021); Upper gastrointestinal endoscopy (1/15/2021); Esophageal pH monitoring (N/A, 2/16/2021); Cardiac catheterization; and Gastric fundoplication (N/A, 8/29/4881).   Additional information:       ALLERGIES   Allergies as of 07/15/2021 - Fully Reviewed 07/11/2021   Allergen Reaction Noted    Seasonal  09/05/2013     Additional information:       MEDICATIONS       Current Facility-Administered Medications:     0.45 % sodium chloride infusion, , Intravenous, Continuous, Joi Louise MD, Last Rate: 100 mL/hr at 07/27/21 1014, Restarted at 07/27/21 1109    Facility-Administered Medications Ordered in Other Encounters:     propofol injection, , Intravenous, PRN, Jp Ollie, APRN - CRNA, 40 mg at 07/27/21 1105    lidocaine 2 % injection, , Intravenous, PRN, Jp Ollie, APRN - CRNA, 50 mg at 07/27/21 1057  Prior to Admission medications    Medication Sig Start Date End Date Taking? Authorizing Provider   eszopiclone (LUNESTA) 1 MG TABS Take 1 mg by mouth nightly. Yes Historical Provider, MD   famotidine (PEPCID) 10 MG tablet Take 10 mg by mouth 2 times daily as needed   Yes Historical Provider, MD   Esomeprazole Magnesium (NEXIUM PO) Take 40 mg by mouth every morning (before breakfast)    Yes Historical Provider, MD   tadalafil (CIALIS) 5 MG tablet Take 1 tablet by mouth as needed for Erectile Dysfunction 6/17/21  Yes Laurie Lee MD   tamsulosin (FLOMAX) 0.4 MG capsule TAKE 2 CAPSULES BY MOUTH ONE TIME A DAY  5/12/21  Yes Laurie Lee MD   atorvastatin (LIPITOR) 10 MG tablet TAKE 1 TABLET BY MOUTH EVERY DAY 5/12/21  Yes Laurie Lee MD   Coenzyme Q10 (COQ-10) 100 MG CAPS Take by mouth   Yes Historical Provider, MD   buPROPion (WELLBUTRIN XL) 150 MG extended release tablet TAKE 1 TABLET BY MOUTH IN THE MORNING  4/5/21  Yes Laurie Lee MD   Melatonin 10 MG TABS Take 1 tablet by mouth nightly as needed    Yes Historical Provider, MD   Calcium Carbonate Antacid 1000 MG CHEW Take by mouth nightly    Yes Historical Provider, MD   Probiotic Product (PROBIOTIC PO) Take by mouth daily    Yes Historical Provider, MD   dexlansoprazole (DEXILANT) 60 MG CPDR delayed release capsule Take 1 capsule by mouth every evening 5/18/20  Yes Historical Provider, MD   fluticasone (FLONASE) 50 MCG/ACT nasal spray 2 sprays by Nasal route daily. 1/30/15  Yes Laurie Lee MD   loratadine (CLARITIN) 10 MG tablet Take 10 mg by mouth daily    Yes Historical Provider, MD   aspirin 81 MG EC tablet Take 81 mg by mouth daily. Yes Historical Provider, MD   therapeutic multivitamin-minerals (THERAGRAN-M) tablet Take 1 tablet by mouth daily. occasionally   Yes Historical Provider, MD     Additional information:       PHYSICAL:    height is 5' 8\" (1.727 m) and weight is 161 lb 12.8 oz (73.4 kg).  His temporal temperature is 97.3 °F (36.3 °C). His blood pressure is 123/73 and his pulse is 61. His respiration is 16 and oxygen saturation is 96%. Heart:  [x]Regular rate and rhythm  []Other:    Lungs:  [x]Clear    []Other:    Abdomen: [x]Soft    []Other:    Mental Status: [x]Alert & Oriented  []Other:      VITAL SIGNS   Patient Vitals for the past 24 hrs:   BP Temp Temp src Pulse Resp SpO2 Height Weight   07/27/21 0955 123/73 97.3 °F (36.3 °C) Temporal 61 16 96 % 5' 8\" (1.727 m) 161 lb 12.8 oz (73.4 kg)       PLANNED PROCEDURE   [x]EGD/BRAVO []Colonoscopy []Flex Sigmoid  []ERCP []EUS   []Cystoscopy  [] CATH [] BRONCH       Consent: I have discussed with the patient and/or the patient representative the indication, alternatives, and the possible risks and/or complications of the planned procedure and the anesthesia methods. The patient and/or patient representative appear to understand and agree to proceed. SEDATION ( see Anesthesia note)    ASA Classification: Class 2 - A normal healthy patient with mild systemic disease    Airway Assessment: normal    Monitoring and Safety: The patient will be placed on a cardiac monitor and vital signs, pulse oximetry and level of consciousness will be continuously evaluated throughout the procedure. The patient will be closely monitored until recovery from the medications is complete and the patient has returned to baseline status. Respiratory therapy will be on standby during the procedure. [x]Pre-procedure diagnostic studies complete and results available. Comment:    [x]Previous sedation/anesthesia experiences assessed. Comment:    [x]The patient is an appropriate candidate to undergo the planned procedure sedation and anesthesia. (Refer to nursing sedation/analgesia documentation record)  [x]Formulation and discussion of sedation/procedure plan, risks, and expectations with patient and/or responsible adult completed. [x]Patient examined immediately prior to the procedure.  (Refer to nursing sedation/analgesia documentation record)    Coby Carlson MD   Electronically signed

## 2021-07-26 NOTE — PROCEDURES
pylorus was normal and the duodenal bulb distended well and  appeared normal.  Post bulbar area was well-visualized and was normal.     DILATION:  Using a 46 Mongolian over the wire American dilator, a dilation was performed. IMPRESSION:      1. Tight lower esophageal sphincter where the LINX is located, no hiatal hernia  . 2. American 46 Mongolian dilator over the wire dilation of the esophagus performed  . 3.  Successful BRAVO pH probe placement at 37 cm       4. Otherwise, normal exam to the second portion of the duodenum. RECOMMENDATIONS:    1. Follow BRAVO directions for 48 hours. No PPI's or antacids. 2.  Follow up appt. In 4 weeks with SHAHZAD to go over results and further recommendations, may need to replace LINX with a larger diameter magnetic ring. 3. Resume all meds.        Specimens: were not obtained    EBL : < 10 mL    (The following sections must be completed)  Post-Sedation Vital Signs: Vital signs were reviewed and were stable after the procedure (see flow sheet for vitals)            Post-Sedation Exam: Lungs: clear to auscultation bilaterally and Cardiovascular: regular rate and rhythm           Complications: none        Laurie Luna MD, [facility]

## 2021-07-27 ENCOUNTER — ANESTHESIA EVENT (OUTPATIENT)
Dept: ENDOSCOPY | Age: 66
End: 2021-07-27
Payer: MEDICARE

## 2021-07-27 ENCOUNTER — ANESTHESIA (OUTPATIENT)
Dept: ENDOSCOPY | Age: 66
End: 2021-07-27
Payer: MEDICARE

## 2021-07-27 ENCOUNTER — HOSPITAL ENCOUNTER (OUTPATIENT)
Age: 66
Setting detail: OUTPATIENT SURGERY
Discharge: HOME | End: 2021-07-27
Attending: INTERNAL MEDICINE | Admitting: INTERNAL MEDICINE
Payer: MEDICARE

## 2021-07-27 VITALS
BODY MASS INDEX: 24.52 KG/M2 | HEART RATE: 78 BPM | OXYGEN SATURATION: 98 % | WEIGHT: 161.8 LBS | SYSTOLIC BLOOD PRESSURE: 116 MMHG | HEIGHT: 68 IN | TEMPERATURE: 97 F | RESPIRATION RATE: 18 BRPM | DIASTOLIC BLOOD PRESSURE: 75 MMHG

## 2021-07-27 VITALS
DIASTOLIC BLOOD PRESSURE: 81 MMHG | SYSTOLIC BLOOD PRESSURE: 133 MMHG | OXYGEN SATURATION: 94 % | RESPIRATION RATE: 16 BRPM

## 2021-07-27 PROCEDURE — 6360000002 HC RX W HCPCS: Performed by: NURSE ANESTHETIST, CERTIFIED REGISTERED

## 2021-07-27 PROCEDURE — 2709999900 HC NON-CHARGEABLE SUPPLY: Performed by: INTERNAL MEDICINE

## 2021-07-27 PROCEDURE — 2580000003 HC RX 258: Performed by: INTERNAL MEDICINE

## 2021-07-27 PROCEDURE — 2500000003 HC RX 250 WO HCPCS: Performed by: NURSE ANESTHETIST, CERTIFIED REGISTERED

## 2021-07-27 PROCEDURE — 3700000001 HC ADD 15 MINUTES (ANESTHESIA): Performed by: INTERNAL MEDICINE

## 2021-07-27 PROCEDURE — 3609012700 HC EGD DILATION SAVORY: Performed by: INTERNAL MEDICINE

## 2021-07-27 PROCEDURE — 7100000010 HC PHASE II RECOVERY - FIRST 15 MIN: Performed by: INTERNAL MEDICINE

## 2021-07-27 PROCEDURE — 3700000000 HC ANESTHESIA ATTENDED CARE: Performed by: INTERNAL MEDICINE

## 2021-07-27 PROCEDURE — 2720000010 HC SURG SUPPLY STERILE: Performed by: INTERNAL MEDICINE

## 2021-07-27 RX ORDER — PROPOFOL 10 MG/ML
INJECTION, EMULSION INTRAVENOUS PRN
Status: DISCONTINUED | OUTPATIENT
Start: 2021-07-27 | End: 2021-07-27 | Stop reason: SDUPTHER

## 2021-07-27 RX ORDER — SODIUM CHLORIDE 450 MG/100ML
INJECTION, SOLUTION INTRAVENOUS CONTINUOUS
Status: DISCONTINUED | OUTPATIENT
Start: 2021-07-27 | End: 2021-07-27 | Stop reason: HOSPADM

## 2021-07-27 RX ORDER — ESZOPICLONE 1 MG/1
1 TABLET, FILM COATED ORAL NIGHTLY
COMMUNITY
End: 2021-10-06

## 2021-07-27 RX ORDER — LIDOCAINE HYDROCHLORIDE 20 MG/ML
INJECTION, SOLUTION INFILTRATION; PERINEURAL PRN
Status: DISCONTINUED | OUTPATIENT
Start: 2021-07-27 | End: 2021-07-27 | Stop reason: SDUPTHER

## 2021-07-27 RX ADMIN — PROPOFOL 40 MG: 10 INJECTION, EMULSION INTRAVENOUS at 11:05

## 2021-07-27 RX ADMIN — SODIUM CHLORIDE: 4.5 INJECTION, SOLUTION INTRAVENOUS at 10:14

## 2021-07-27 RX ADMIN — PROPOFOL 40 MG: 10 INJECTION, EMULSION INTRAVENOUS at 11:01

## 2021-07-27 RX ADMIN — LIDOCAINE HYDROCHLORIDE 50 MG: 20 INJECTION, SOLUTION INFILTRATION; PERINEURAL at 10:57

## 2021-07-27 RX ADMIN — PROPOFOL 60 MG: 10 INJECTION, EMULSION INTRAVENOUS at 10:57

## 2021-07-27 ASSESSMENT — PAIN SCALES - GENERAL
PAINLEVEL_OUTOF10: 0
PAINLEVEL_OUTOF10: 0

## 2021-07-27 NOTE — PROGRESS NOTES
Photos taken, scope used GIF SER#577 Bravo capsule placed at 37 CM . American Dilation completed over guidewire, guidewire intact using 51 FR. No biopsies taken, EGD completed, pt tolerated well.

## 2021-07-27 NOTE — ANESTHESIA POSTPROCEDURE EVALUATION
Department of Anesthesiology  Postprocedure Note    Patient: Blaine Nicholas  MRN: [id number]  YOB: 1955  Date of evaluation: 7/27/2021  Time:  11:13 AM     Procedure Summary     Date: 07/27/21 Room / Location: [address] [place] [address] / [address] [place] [address]    Anesthesia Start: 1053 Anesthesia Stop: 1112    Procedure: EGD DILATION W/ BRAVO (N/A ) Diagnosis: (DYSPHAGIA, GERD)    Surgeons: Manny Martinez MD Responsible Provider: Carolyn Noe MD    Anesthesia Type: MAC ASA Status: 2          Anesthesia Type: MAC    Renata Phase I: Renata Score: 10    Renata Phase II:      Last vitals: Reviewed and per EMR flowsheets.        Anesthesia Post Evaluation    Patient location during evaluation: floor  Patient participation: complete - patient cannot participate  Level of consciousness: sleepy but conscious  Pain score: 0  Airway patency: patent  Nausea & Vomiting: no nausea and no vomiting  Complications: no  Cardiovascular status: hemodynamically stable  Respiratory status: acceptable  Hydration status: euvolemic

## 2021-07-27 NOTE — ANESTHESIA PRE PROCEDURE
Department of Anesthesiology  Preprocedure Note       Name:  Blaine Nicholas   Age:  76 y.o.  :  1955                                          MRN:  [id number]         Date:  2021      Surgeon: Denis Block):  Manny Martinez MD    Procedure: Procedure(s):  EGD DILATION W/ BRAVO    Medications prior to admission:   Prior to Admission medications    Medication Sig Start Date End Date Taking? Authorizing Provider   eszopiclone (LUNESTA) 1 MG TABS Take 1 mg by mouth nightly. Yes Historical Provider, MD   famotidine (PEPCID) 10 MG tablet Take 10 mg by mouth 2 times daily as needed   Yes Historical Provider, MD   Esomeprazole Magnesium (NEXIUM PO) Take 40 mg by mouth every morning (before breakfast)    Yes Historical Provider, MD   tadalafil (CIALIS) 5 MG tablet Take 1 tablet by mouth as needed for Erectile Dysfunction 21  Yes Romana Jimenez MD   tamsulosin (FLOMAX) 0.4 MG capsule TAKE 2 CAPSULES BY MOUTH ONE TIME A DAY  21  Yes Romana Jimenez MD   atorvastatin (LIPITOR) 10 MG tablet TAKE 1 TABLET BY MOUTH EVERY DAY 21  Yes Romana Jimenez MD   Coenzyme Q10 (COQ-10) 100 MG CAPS Take by mouth   Yes Historical Provider, MD   buPROPion (WELLBUTRIN XL) 150 MG extended release tablet TAKE 1 TABLET BY MOUTH IN THE MORNING  21  Yes Romana Jimenez MD   Melatonin 10 MG TABS Take 1 tablet by mouth nightly as needed    Yes Historical Provider, MD   Calcium Carbonate Antacid 1000 MG CHEW Take by mouth nightly    Yes Historical Provider, MD   Probiotic Product (PROBIOTIC PO) Take by mouth daily    Yes Historical Provider, MD   dexlansoprazole (DEXILANT) 60 MG CPDR delayed release capsule Take 1 capsule by mouth every evening 20  Yes Historical Provider, MD   fluticasone (FLONASE) 50 MCG/ACT nasal spray 2 sprays by Nasal route daily.  1/30/15  Yes Romana Jimenez MD   loratadine (CLARITIN) 10 MG tablet Take 10 mg by mouth daily    Yes Historical Provider, MD   aspirin 81 MG EC tablet Take 81 mg by mouth daily. Yes Historical Provider, MD   therapeutic multivitamin-minerals (THERAGRAN-M) tablet Take 1 tablet by mouth daily. occasionally   Yes Historical Provider, MD       Current medications:    Current Facility-Administered Medications   Medication Dose Route Frequency Provider Last Rate Last Admin    0.45 % sodium chloride infusion   Intravenous Continuous Chandana Martin  mL/hr at 07/27/21 1014 New Bag at 07/27/21 1014       Allergies: Allergies   Allergen Reactions    Seasonal      hayfever per pt. Problem List:    Patient Active Problem List   Diagnosis Code    Hyperlipidemia E78.5    GERD (gastroesophageal reflux disease) K21.9    Chronic allergic rhinitis J30.9    Benign non-nodular prostatic hyperplasia with lower urinary tract symptoms N40.1    IFG (impaired fasting glucose) R73.01    Obstructive sleep apnea on CPAP G47.33, Z99.89    Insomnia G47.00    Erectile dysfunction N52.9    Adjustment disorder with depressed mood F43.21    Hiatal hernia K44.9       Past Medical History:        Diagnosis Date    Allergic     Allergic rhinitis     Arthritis     Back pain     Depression     GERD (gastroesophageal reflux disease)     Hyperlipidemia     Prolonged emergence from general anesthesia     Prostatism     Unspecified sleep apnea     has CPAP       Past Surgical History:        Procedure Laterality Date    CARDIAC CATHETERIZATION      COLONOSCOPY  2011    ESOPHAGEAL [address] MONITORING N/A 2/16/2021    ESOPHAGEAL PH MONITORING (NO EGD) performed by Elyse Jackson MD at [facility]    EYE SURGERY      GASTRIC FUNDOPLICATION N/A 6/14/7830    ROBOTIC LINX, Hiatal hernia repair, Umbilical hernia repair.  performed by Addy Zarco MD at [place] [address]      tendonitus repair     RETINAL DETACHMENT SURGERY      UPPER GASTROINTESTINAL ENDOSCOPY N/A 1/15/2021    RANDALL performed by Sherlynn Bloch, MD at [address] [address] GASTROINTESTINAL ENDOSCOPY  1/15/2021    EGD BIOPSY performed by Lizette Mcginnis MD at [facility]       Social History:    Social History     Tobacco Use    Smoking status: Never Smoker    Smokeless tobacco: Never Used   Substance Use Topics    Alcohol use: Not Currently     Alcohol/week: 0.0 standard drinks                                Counseling given: Not Answered      Vital Signs (Current):   Vitals:    07/27/21 0955   BP: 123/73   Pulse: 61   Resp: 16   Temp: 36.3 °C (97.3 °F)   TempSrc: Temporal   SpO2: 96%   Weight: 161 lb 12.8 oz (73.4 kg)   Height: 5' 8\" (1.727 m)                                              BP Readings from Last 3 Encounters:   07/27/21 123/73   07/09/21 128/86   06/30/21 124/78       NPO Status: Time of last liquid consumption: 2230                        Time of last solid consumption: 2230                        Date of last liquid consumption: 07/26/21                        Date of last solid food consumption: 07/26/21    BMI:   Wt Readings from Last 3 Encounters:   07/27/21 161 lb 12.8 oz (73.4 kg)   07/09/21 168 lb (76.2 kg)   06/30/21 166 lb 12.8 oz (75.7 kg)     Body mass index is 24.6 kg/m². CBC:   Lab Results   Component Value Date    WBC 6.1 03/01/2021    RBC 4.86 03/01/2021    RBC 4.93 05/24/2016    HGB 15.1 03/01/2021    HCT 45.7 03/01/2021    MCV 94.0 03/01/2021    RDW 12.9 06/07/2017     03/01/2021       CMP:   Lab Results   Component Value Date     03/01/2021    K 4.4 03/01/2021     03/01/2021    CO2 32 03/01/2021    BUN 18 03/01/2021    CREATININE 1.1 03/01/2021    LABGLOM 67 03/01/2021    GLUCOSE 102 03/01/2021    GLUCOSE 106 08/03/2020    PROT 7.0 08/03/2020    CALCIUM 9.5 03/01/2021    BILITOT 1.0 08/03/2020    ALKPHOS 91 08/03/2020    ALKPHOS 82 12/12/2019    AST 18 08/03/2020    ALT 19 08/03/2020       POC Tests: No results for input(s): POCGLU, POCNA, POCK, POCCL, POCBUN, POCHEMO, POCHCT in the last 72 hours.     Coags: No results found for: PROTIME, INR, APTT    HCG (If Applicable): No results found for: PREGTESTUR, PREGSERUM, HCG, HCGQUANT     ABGs: No results found for: PHART, PO2ART, FSN9ULQ, IEO8VSP, BEART, T2ZXMLWE     Type & Screen (If Applicable):  No results found for: LABABO, LABRH    Drug/Infectious Status (If Applicable):  No results found for: HIV, HEPCAB    COVID-19 Screening (If Applicable):   Lab Results   Component Value Date    COVID19 Not Detected 03/09/2021           Anesthesia Evaluation    Airway: Mallampati: II        Dental: normal exam         Pulmonary:normal exam    (+) sleep apnea:            Patient did not smoke on day of surgery. Cardiovascular:Negative CV ROS  Exercise tolerance: good (>4 METS),                     Neuro/Psych:   (+) psychiatric history:            GI/Hepatic/Renal:   (+) hiatal hernia, GERD: no interval change,           Endo/Other:                     Abdominal:             Vascular: negative vascular ROS. Other Findings:             Anesthesia Plan      MAC     ASA 2       Induction: intravenous. Plan discussed with CRNA.                   ROSMERY Sanders - AYANA   7/27/2021

## 2021-08-23 DIAGNOSIS — F43.21 ADJUSTMENT DISORDER WITH DEPRESSED MOOD: ICD-10-CM

## 2021-08-23 RX ORDER — BUPROPION HYDROCHLORIDE 150 MG/1
TABLET ORAL
Qty: 90 TABLET | Refills: 3 | Status: SHIPPED | OUTPATIENT
Start: 2021-08-23 | End: 2021-10-06

## 2021-08-23 NOTE — TELEPHONE ENCOUNTER
This medication refill is regarding a electronic request.  Refill requested by Lubna Sellers. Requested Prescriptions     Pending Prescriptions Disp Refills    buPROPion (WELLBUTRIN XL) 150 MG extended release tablet [Pharmacy Med Name: buPROPion HCl ER (XL) Oral Tablet Extended Release 24 Hour 150 MG] 90 tablet 0     Sig: TAKE 1 TABLET BY MOUTH IN THE MORNING       Date of last visit: 6/17/2021   Date of next visit: 10/6/2021  Date of last refill: 4/5/2021  90/0    Rx verified, ordered and set to EP.

## 2021-10-06 ENCOUNTER — OFFICE VISIT (OUTPATIENT)
Dept: FAMILY MEDICINE CLINIC | Age: 66
End: 2021-10-06
Payer: MEDICARE

## 2021-10-06 VITALS
WEIGHT: 171 LBS | HEIGHT: 68 IN | HEART RATE: 76 BPM | SYSTOLIC BLOOD PRESSURE: 112 MMHG | RESPIRATION RATE: 16 BRPM | BODY MASS INDEX: 25.91 KG/M2 | DIASTOLIC BLOOD PRESSURE: 60 MMHG

## 2021-10-06 DIAGNOSIS — F43.21 ADJUSTMENT DISORDER WITH DEPRESSED MOOD: ICD-10-CM

## 2021-10-06 DIAGNOSIS — E78.5 HYPERLIPIDEMIA, UNSPECIFIED HYPERLIPIDEMIA TYPE: ICD-10-CM

## 2021-10-06 DIAGNOSIS — R73.01 IFG (IMPAIRED FASTING GLUCOSE): ICD-10-CM

## 2021-10-06 DIAGNOSIS — Z00.00 ROUTINE GENERAL MEDICAL EXAMINATION AT A HEALTH CARE FACILITY: Primary | ICD-10-CM

## 2021-10-06 DIAGNOSIS — Z71.89 ADVANCED CARE PLANNING/COUNSELING DISCUSSION: ICD-10-CM

## 2021-10-06 DIAGNOSIS — K21.9 GASTROESOPHAGEAL REFLUX DISEASE, UNSPECIFIED WHETHER ESOPHAGITIS PRESENT: ICD-10-CM

## 2021-10-06 PROCEDURE — G8484 FLU IMMUNIZE NO ADMIN: HCPCS | Performed by: NURSE PRACTITIONER

## 2021-10-06 PROCEDURE — G0438 PPPS, INITIAL VISIT: HCPCS | Performed by: NURSE PRACTITIONER

## 2021-10-06 PROCEDURE — 1123F ACP DISCUSS/DSCN MKR DOCD: CPT | Performed by: NURSE PRACTITIONER

## 2021-10-06 PROCEDURE — 4040F PNEUMOC VAC/ADMIN/RCVD: CPT | Performed by: NURSE PRACTITIONER

## 2021-10-06 PROCEDURE — 3017F COLORECTAL CA SCREEN DOC REV: CPT | Performed by: NURSE PRACTITIONER

## 2021-10-06 RX ORDER — AMITRIPTYLINE HYDROCHLORIDE 25 MG/1
TABLET, FILM COATED ORAL
COMMUNITY
Start: 2021-09-23 | End: 2022-02-07 | Stop reason: ALTCHOICE

## 2021-10-06 ASSESSMENT — LIFESTYLE VARIABLES
HOW OFTEN DURING THE LAST YEAR HAVE YOU FAILED TO DO WHAT WAS NORMALLY EXPECTED FROM YOU BECAUSE OF DRINKING: 0
HOW OFTEN DURING THE LAST YEAR HAVE YOU HAD A FEELING OF GUILT OR REMORSE AFTER DRINKING: 0
AUDIT TOTAL SCORE: 1
AUDIT-C TOTAL SCORE: 1
HOW OFTEN DO YOU HAVE A DRINK CONTAINING ALCOHOL: 1
HAS A RELATIVE, FRIEND, DOCTOR, OR ANOTHER HEALTH PROFESSIONAL EXPRESSED CONCERN ABOUT YOUR DRINKING OR SUGGESTED YOU CUT DOWN: 0
HAVE YOU OR SOMEONE ELSE BEEN INJURED AS A RESULT OF YOUR DRINKING: 0
HOW OFTEN DURING THE LAST YEAR HAVE YOU BEEN UNABLE TO REMEMBER WHAT HAPPENED THE NIGHT BEFORE BECAUSE YOU HAD BEEN DRINKING: 0
HOW OFTEN DURING THE LAST YEAR HAVE YOU NEEDED AN ALCOHOLIC DRINK FIRST THING IN THE MORNING TO GET YOURSELF GOING AFTER A NIGHT OF HEAVY DRINKING: 0
HOW OFTEN DURING THE LAST YEAR HAVE YOU FOUND THAT YOU WERE NOT ABLE TO STOP DRINKING ONCE YOU HAD STARTED: 0
HOW OFTEN DO YOU HAVE SIX OR MORE DRINKS ON ONE OCCASION: 0
HOW MANY STANDARD DRINKS CONTAINING ALCOHOL DO YOU HAVE ON A TYPICAL DAY: 0

## 2021-10-06 ASSESSMENT — PATIENT HEALTH QUESTIONNAIRE - PHQ9
SUM OF ALL RESPONSES TO PHQ QUESTIONS 1-9: 0
SUM OF ALL RESPONSES TO PHQ9 QUESTIONS 1 & 2: 0
SUM OF ALL RESPONSES TO PHQ QUESTIONS 1-9: 0
2. FEELING DOWN, DEPRESSED OR HOPELESS: 0
SUM OF ALL RESPONSES TO PHQ QUESTIONS 1-9: 0
1. LITTLE INTEREST OR PLEASURE IN DOING THINGS: 0

## 2021-10-06 NOTE — PATIENT INSTRUCTIONS
Personalized Preventive Plan for Rafael Scheuermann - 10/6/2021  Medicare offers a range of preventive health benefits. Some of the tests and screenings are paid in full while other may be subject to a deductible, co-insurance, and/or copay. Some of these benefits include a comprehensive review of your medical history including lifestyle, illnesses that may run in your family, and various assessments and screenings as appropriate. After reviewing your medical record and screening and assessments performed today your provider may have ordered immunizations, labs, imaging, and/or referrals for you. A list of these orders (if applicable) as well as your Preventive Care list are included within your After Visit Summary for your review. Other Preventive Recommendations:    · A preventive eye exam performed by an eye specialist is recommended every 1-2 years to screen for glaucoma; cataracts, macular degeneration, and other eye disorders. · A preventive dental visit is recommended every 6 months. · Try to get at least 150 minutes of exercise per week or 10,000 steps per day on a pedometer . · Order or download the FREE \"Exercise & Physical Activity: Your Everyday Guide\" from [employer]. Call 9-341.474.4498 or search [employer] online. · You need 9683-1305 mg of calcium and 5756-3310 IU of vitamin D per day. It is possible to meet your calcium requirement with diet alone, but a vitamin D supplement is usually necessary to meet this goal.  · When exposed to the sun, use a sunscreen that protects against both UVA and UVB radiation with an SPF of 30 or greater. Reapply every 2 to 3 hours or after sweating, drying off with a towel, or swimming. · Always wear a seat belt when traveling in a car. Always wear a helmet when riding a bicycle or motorcycle.

## 2021-10-06 NOTE — PROGRESS NOTES
[facility]  [address] [place] [address]  Dept: [phone]  Dept Fax: [phone]: [phone]      Medicare Annual Wellness Visit  Name: Venita Sesay [id number] Date: 10/6/2021   MRN: [id number] Sex: Male   Age: 76 y.o. Ethnicity: Non- / Non    : 1955 Race: White (non-)      Venita Sesay is here for Medicare AWV (Did not do blood work)    Screenings for behavioral, psychosocial and functional/safety risks, and cognitive dysfunction are all negative except as indicated below. These results, as well as other patient data from the [address] form, are documented in Flowsheets linked to this Encounter. Allergies   Allergen Reactions    Seasonal      hayfever per pt. Prior to Visit Medications    Medication Sig Taking?  Authorizing Provider   amitriptyline (ELAVIL) 25 MG tablet TAKE 1/2 TABLET BY MOUTH AT BEDTIME FOR 4 DAYS, THEN TAKE 1 TABLET AT BEDTIME THEREAFTER Yes Historical Provider, MD   Simethicone 125 MG TABS  Yes Historical Provider, MD   famotidine (PEPCID) 10 MG tablet Take 10 mg by mouth 2 times daily as needed Yes Historical Provider, MD   Esomeprazole Magnesium (NEXIUM PO) Take 40 mg by mouth every morning (before breakfast)  Yes Historical Provider, MD   tadalafil (CIALIS) 5 MG tablet Take 1 tablet by mouth as needed for Erectile Dysfunction Yes Fatoumata Powell MD   tamsulosin (FLOMAX) 0.4 MG capsule TAKE 2 CAPSULES BY MOUTH ONE TIME A DAY  Yes Fatoumata Powell MD   atorvastatin (LIPITOR) 10 MG tablet TAKE 1 TABLET BY MOUTH EVERY DAY  Patient taking differently: TAKE 0.5 TABLET BY MOUTH EVERY DAY Yes Fatoumata Powell MD   Coenzyme Q10 (COQ-10) 100 MG CAPS Take by mouth Yes Historical Provider, MD   Probiotic Product (PROBIOTIC PO) Take by mouth daily  Yes Historical Provider, MD   dexlansoprazole (DEXILANT) 60 MG CPDR delayed release capsule Take 1 capsule by mouth every evening Yes Historical Provider, MD   fluticasone (FLONASE) 50 MCG/ACT nasal spray 2 sprays by Nasal route daily. Yes Priya Stern MD   therapeutic multivitamin-minerals [facility]) tablet Take 1 tablet by mouth daily. occasionally Yes Historical Provider, MD         Past Medical History:   Diagnosis Date    Allergic     Allergic rhinitis     Arthritis     Back pain     Depression     GERD (gastroesophageal reflux disease)     Hyperlipidemia     Prolonged emergence from general anesthesia     Prostatism     Unspecified sleep apnea     has CPAP       Past Surgical History:   Procedure Laterality Date    CARDIAC CATHETERIZATION      COLONOSCOPY  2011    ESOPHAGEAL PH MONITORING N/A 2/16/2021    ESOPHAGEAL PH MONITORING (NO EGD) performed by Marleny Bianchi MD at [address] GASTRIC FUNDOPLICATION N/A 3/80/4363    ROBOTIC LINX, Hiatal hernia repair, Umbilical hernia repair.  performed by Doreen Vallecillo MD at [address]      tendonitus repair     RETINAL DETACHMENT SURGERY      UPPER GASTROINTESTINAL ENDOSCOPY N/A 1/15/2021    BRAVO performed by Joseph Giraldo MD at [address] [facility] [facility]    UPPER GASTROINTESTINAL ENDOSCOPY  1/15/2021    EGD BIOPSY performed by Joseph Giraldo MD at [address] [facility]    UPPER GASTROINTESTINAL ENDOSCOPY N/A 7/27/2021    EGD DILATION W/ RANDALL performed by Amy Mckeon MD at [address] [facility]         Family History   Problem Relation Age of Onset    Arthritis Mother     Hearing Loss Mother     High Blood Pressure Mother     High Cholesterol Mother     Stroke Mother     Cancer Mother     Depression Father     Dementia Father     Arthritis Father     Diabetes Father     Birth Defects Sister     Diabetes Paternal Grandfather     High Blood Pressure Brother     Arthritis Brother     Other Brother         heart palpatations    Cancer Maternal Grandfather     Cancer Maternal Grandmother        CareTeam (Including outside providers/suppliers regularly involved in providing care):   Patient Care Team:  Saurav Arellano MD as PCP - General (Family Medicine)  Saurav Arellano MD as PCP - [facility] Empaneled Provider    Wt Readings from Last 3 Encounters:   10/06/21 171 lb (77.6 kg)   07/27/21 161 lb 12.8 oz (73.4 kg)   07/09/21 168 lb (76.2 kg)     Vitals:    10/06/21 0845   BP: 112/60   Pulse: 76   Resp: 16   Weight: 171 lb (77.6 kg)   Height: 5' 8\" (1.727 m)     Body mass index is 26 kg/m². Based upon direct observation of the patient, evaluation of cognition reveals recent and remote memory intact. General Appearance: alert and oriented to person, place and time, well-developed and well-nourished, in no acute distress  Skin: warm and dry, no rash or erythema  Head: normocephalic and atraumatic  Eyes: pupils equal, round, and reactive to light, extraocular eye movements intact, conjunctivae normal  ENT: tympanic membrane, external ear and ear canal normal bilaterally, oropharynx clear and moist with normal mucous membranes  Neck: neck supple and non tender without mass, no thyromegaly or thyroid nodules, no cervical lymphadenopathy   Pulmonary/Chest: clear to auscultation bilaterally- no wheezes, rales or rhonchi, normal air movement, no respiratory distress  Cardiovascular: normal rate, normal S1 and S2, no gallops, intact distal pulses and no carotid bruits  Abdomen: soft, non-tender, non-distended, normal bowel sounds, no masses or organomegaly  Extremities: no cyanosis and no clubbing  Musculoskeletal: normal range of motion, no joint swelling, deformity or tenderness  Neurologic: gait and coordination normal and speech normal    Patient's complete Health Risk Assessment and screening values have been reviewed and are found in Flowsheets. The following problems were reviewed today and where indicated follow up appointments were made and/or referrals ordered.     Positive Risk Factor Screenings with Interventions: General Health and ACP:  General  In general, how would you say your health is?: Good  In the past 7 days, have you experienced any of the following?  New or Increased Pain, New or Increased Fatigue, Loneliness, Social Isolation, Stress or Anger?: (!) Loneliness, Stress  Do you get the social and emotional support that you need?: (!) No  Do you have a Living Will?: (!) No  Advance Directives     Power of  Living Will ACP-Advance Directive ACP-Power of     Not on File Not on File Not on File Not on File      General Health Risk Interventions:  · No Living Will: Advance Care Planning addressed with patient today and Patient referred to [place] Habits/Nutrition:  Health Habits/Nutrition  Do you exercise for at least 20 minutes 2-3 times per week?: (!) No  Have you lost any weight without trying in the past 3 months?: No  Do you eat only one meal per day?: No  Have you seen the dentist within the past year?: Yes  Body mass index: (!) 26  Health Habits/Nutrition Interventions:  · Inadequate physical activity:  patient is not ready to increase his/her physical activity level at this time    Hearing/Vision:  No exam data present  Hearing/Vision  Do you or your family notice any trouble with your hearing that hasn't been managed with hearing aids?: No  Do you have difficulty driving, watching TV, or doing any of your daily activities because of your eyesight?: No  Have you had an eye exam within the past year?: (!) No  Hearing/Vision Interventions:  · Vision concerns:  patient encouraged to make appointment with his/her eye specialist    Safety:  Safety  Do you have working smoke detectors?: (!) No  Do you have non-slip mats or surfaces in all bathtubs/showers?: (!) No  Do all of your stairways have a railing or banister?: (!) No  Are your doorways, halls and stairs free of clutter?: Yes  Do you always fasten your seatbelt when you are in a car?: Yes  Safety Interventions:  · Home safety tips provided     Personalized Preventive Plan   Current Health Maintenance Status  Immunization History   Administered Date(s) Administered    COVID-19, Moderna, PF, 100mcg/0.5mL 02/23/2021, 03/23/2021    Pneumococcal Polysaccharide ([id number]) 08/11/2020    Tdap (Boostrix, Adacel) 07/25/2013    Zoster Live (Zostavax) 01/25/2013    Zoster Recombinant (Shingrix) 07/09/2019, 10/14/2019        Health Maintenance   Topic Date Due    Annual Wellness Visit (AWV)  Never done    Colon cancer screen colonoscopy  04/08/2021    Lipid screen  08/03/2021    Flu vaccine (1) Never done    A1C test (Diabetic or Prediabetic)  09/26/2021    DTaP/Tdap/Td vaccine (2 - Td or Tdap) 07/25/2023    Shingles Vaccine  Completed    Pneumococcal 65+ years Vaccine  Completed    COVID-19 Vaccine  Completed    Hepatitis A vaccine  Aged Out    Hepatitis B vaccine  Aged Out    Hib vaccine  Aged Out    Meningococcal (ACWY) vaccine  Aged Out    Hepatitis C screen  Discontinued     Recommendations for [employer] Due: see orders and patient instructions/AVS.  . Recommended screening schedule for the next 5-10 years is provided to the patient in written form: see Patient Instructions/AVS.    Geeta Ward was seen today for medicare awv. Diagnoses and all orders for this visit:    Routine general medical examination at a health care facility    IFG (impaired fasting glucose)  -     Hemoglobin A1C; Future    Hyperlipidemia, unspecified hyperlipidemia type    Adjustment disorder with depressed mood    Gastroesophageal reflux disease, unspecified whether esophagitis present    Advanced care planning/counseling discussion  -     [facility] Referral to ACP Clinical Specialist           - Had Linx procedure- Not helping with GERD issues. Follow up with Dr Nehemiah Allen as planned. Cutting back on other medications to see if that helps with his GERD symptoms.    Check A1C, FLP, CMP, TSH/ FREE T4, PSA, and VIT D 25 OH- Due now, have completed ASAP. - Patient given educational materials - see patient instructions. Discussed use, benefit, and side effects of prescribed medications. All patient questions answered. Pt voiced understanding. Reviewed Health Maintenance.    - Follow up with Dr Umberto Lynn as planned    Electronically signed by ROSMERY Rivas CNP on 10/6/2021 at 9:25 AM

## 2021-10-09 ENCOUNTER — NURSE ONLY (OUTPATIENT)
Dept: LAB | Age: 66
End: 2021-10-09

## 2021-10-09 DIAGNOSIS — N40.1 BENIGN NON-NODULAR PROSTATIC HYPERPLASIA WITH LOWER URINARY TRACT SYMPTOMS: ICD-10-CM

## 2021-10-09 DIAGNOSIS — E78.5 HYPERLIPIDEMIA, UNSPECIFIED HYPERLIPIDEMIA TYPE: ICD-10-CM

## 2021-10-09 DIAGNOSIS — K21.9 GASTROESOPHAGEAL REFLUX DISEASE, UNSPECIFIED WHETHER ESOPHAGITIS PRESENT: ICD-10-CM

## 2021-10-09 DIAGNOSIS — R73.01 IFG (IMPAIRED FASTING GLUCOSE): ICD-10-CM

## 2021-10-09 DIAGNOSIS — G47.33 OBSTRUCTIVE SLEEP APNEA ON CPAP: ICD-10-CM

## 2021-10-09 DIAGNOSIS — Z12.5 SCREENING PSA (PROSTATE SPECIFIC ANTIGEN): ICD-10-CM

## 2021-10-09 DIAGNOSIS — F43.21 ADJUSTMENT DISORDER WITH DEPRESSED MOOD: ICD-10-CM

## 2021-10-09 DIAGNOSIS — G47.09 OTHER INSOMNIA: ICD-10-CM

## 2021-10-09 DIAGNOSIS — N52.9 ERECTILE DYSFUNCTION, UNSPECIFIED ERECTILE DYSFUNCTION TYPE: ICD-10-CM

## 2021-10-09 DIAGNOSIS — R53.83 OTHER FATIGUE: ICD-10-CM

## 2021-10-09 DIAGNOSIS — Z99.89 OBSTRUCTIVE SLEEP APNEA ON CPAP: ICD-10-CM

## 2021-10-09 LAB
ALBUMIN SERPL-MCNC: 4.7 G/DL (ref 3.5–5.1)
ALP BLD-CCNC: 104 U/L (ref 38–126)
ALT SERPL-CCNC: 26 U/L (ref 11–66)
ANION GAP SERPL CALCULATED.3IONS-SCNC: 9 MEQ/L (ref 8–16)
AST SERPL-CCNC: 25 U/L (ref 5–40)
AVERAGE GLUCOSE: 105 MG/DL (ref 70–126)
BILIRUB SERPL-MCNC: 1.1 MG/DL (ref 0.3–1.2)
BUN BLDV-MCNC: 16 MG/DL (ref 7–22)
CALCIUM SERPL-MCNC: 9.6 MG/DL (ref 8.5–10.5)
CHLORIDE BLD-SCNC: 102 MEQ/L (ref 98–111)
CHOLESTEROL, TOTAL: 163 MG/DL (ref 100–199)
CO2: 33 MEQ/L (ref 23–33)
CREAT SERPL-MCNC: 1 MG/DL (ref 0.4–1.2)
GFR SERPL CREATININE-BSD FRML MDRD: 75 ML/MIN/1.73M2
GLUCOSE BLD-MCNC: 105 MG/DL (ref 70–108)
HBA1C MFR BLD: 5.5 % (ref 4.4–6.4)
HDLC SERPL-MCNC: 50 MG/DL
LDL CHOLESTEROL CALCULATED: 81 MG/DL
POTASSIUM SERPL-SCNC: 4.3 MEQ/L (ref 3.5–5.2)
PROSTATE SPECIFIC ANTIGEN: 2.44 NG/ML (ref 0–1)
SODIUM BLD-SCNC: 144 MEQ/L (ref 135–145)
T4 FREE: 0.91 NG/DL (ref 0.93–1.76)
TOTAL PROTEIN: 6.7 G/DL (ref 6.1–8)
TRIGL SERPL-MCNC: 160 MG/DL (ref 0–199)
TSH SERPL DL<=0.05 MIU/L-ACNC: 2.41 UIU/ML (ref 0.4–4.2)
VITAMIN D 25-HYDROXY: 46 NG/ML (ref 30–100)

## 2021-10-14 ENCOUNTER — CLINICAL DOCUMENTATION (OUTPATIENT)
Dept: SPIRITUAL SERVICES | Facility: CLINIC | Age: 66
End: 2021-10-14

## 2021-10-14 NOTE — ACP (ADVANCE CARE PLANNING)
Advance Care Planning   Ambulatory ACP Specialist Patient Outreach    Date:  10/14/2021  ACP Specialist:  Jose Herbert    Outreach call to patient in follow-up to ACP Specialist referral from: Deanna Cates CNP    [x] PCP  [] Provider   [] Ambulatory Care Management [] Other for Reason:    [x] Advance Directive Assistance  [] Code Status Discussion  [] Complete Portable DNR Order  [x] Discuss Goals of Care  [] Complete POST/MOST  [] Early ACP Decision-Making  [] Other    Date Referral Received: 10/6/2021    Today's Outreach:  [x] First   [] Second  [] Third                               Third outreach made by []  phone  [] email []   Network Chemistryt     Intervention:  [] Spoke with Patient  [x] Left VM requesting return call      Outcome:   made an initial attempt to contact Bianca Bravo via telephone call to offer support, if desired, for the completion of Advance Directives documents as part of an [address] conversation. * No answer. Left message. *  will follow-up. Next Step:   [] ACP scheduled conversation  [x] Outreach again in one week               [] Email / Mail ACP Info Sheets  [] Email / Mail Advance Directive            [] Close Referral. Routing closure to referring provider/staff and to ACP Specialist .      Thank you for this referral.

## 2021-10-21 ENCOUNTER — TELEPHONE (OUTPATIENT)
Dept: FAMILY MEDICINE CLINIC | Age: 66
End: 2021-10-21

## 2021-10-21 DIAGNOSIS — R94.6 ABNORMAL THYROID FUNCTION TEST: Primary | ICD-10-CM

## 2021-10-21 NOTE — TELEPHONE ENCOUNTER
----- Message from Caroline Mcleod MD sent at 10/10/2021  3:13 PM EDT -----  Notify pt-   Results reviewed. PSA elevated at 2.44 (actually improved from 8/3/2020)-will continue annual surveillanceTSH okay/free T4 slightly decreased at 0.91Vitamin D 25 OH okayFLP okay overall-continue current dose of LipitorCMP okay. Recheck free T4/TSH in 2 to 3 months to assure normalization.   ES

## 2021-10-21 NOTE — TELEPHONE ENCOUNTER
----- Message from ROSMERY Stewart CNP sent at 10/10/2021  9:03 PM EDT -----  A1C was good at 5.5%. Follow up as planned. My chart message also sent.  -WS

## 2021-10-22 NOTE — TELEPHONE ENCOUNTER
Pt notified of the lab results and ES recommendations and verbalized understanding. Lab orders mailed to pt.

## 2021-11-01 ENCOUNTER — CLINICAL DOCUMENTATION (OUTPATIENT)
Dept: SPIRITUAL SERVICES | Facility: CLINIC | Age: 66
End: 2021-11-01

## 2021-11-01 NOTE — ACP (ADVANCE CARE PLANNING)
Advance Care Planning   Ambulatory ACP Specialist Patient Outreach    Date:  11/1/2021  ACP Specialist:  Elsy Albarado    Outreach call to patient in follow-up to ACP Specialist referral from: Rolanda Harris CNP    [x] PCP  [] Provider   [] Ambulatory Care Management [] Other for Reason:    [x] Advance Directive Assistance  [] Code Status Discussion  [] Complete Portable DNR Order  [x] Discuss Goals of Care  [] Complete POST/MOST  [] Early ACP Decision-Making  [] Other    Date Referral Received: 10/6/2021    Today's Outreach:  [] First   [x] Second  [] Third                               Third outreach made by []  phone  [] email []   MyChart     Intervention:  [x] Spoke with Patient  [] Left VM requesting return call      Outcome:    Francis Gallardo made a 2nd attempt to contact Jay Erickson via telephone call to offer support, if desired, for the completion of Advance Directives documents as part of an [address] conversation. * Jay Erickson expressed interest for self, as well as for wife - Virgle Dennise. *  briefly explained documents for clarity and greater understanding, as well as information needed for completion. * An appointment is scheduled for 11/10 at [address] [place] [address]. Next Step:   [x] ACP scheduled conversation  [] Outreach again in one week               [] Email / Mail ACP Info Sheets  [] Email / Mail Advance Directive            [] Close Referral. Routing closure to referring provider/staff and to ACP Specialist .      Thank you for this referral.

## 2021-11-10 ENCOUNTER — TELEPHONE (OUTPATIENT)
Dept: SPIRITUAL SERVICES | Facility: CLINIC | Age: 66
End: 2021-11-10

## 2021-11-10 NOTE — TELEPHONE ENCOUNTER
The  met with Laine Gordon and his wife Crystal Waddell to complete a Advance Directive. I explained the documents and answered questions pertaining to the Living Will and [address]. After they reviewed the documents I assisted them with completing the forms. I also gave them both a copy of the DNR form and explained that it must be signed by their docotor.

## 2022-01-11 ENCOUNTER — NURSE ONLY (OUTPATIENT)
Dept: LAB | Age: 67
End: 2022-01-11

## 2022-01-11 DIAGNOSIS — R94.6 ABNORMAL THYROID FUNCTION TEST: ICD-10-CM

## 2022-01-11 LAB
T4 FREE: 1.06 NG/DL (ref 0.93–1.76)
TSH SERPL DL<=0.05 MIU/L-ACNC: 1.58 UIU/ML (ref 0.4–4.2)

## 2022-02-07 ENCOUNTER — PATIENT MESSAGE (OUTPATIENT)
Dept: FAMILY MEDICINE CLINIC | Age: 67
End: 2022-02-07

## 2022-02-07 DIAGNOSIS — G47.09 OTHER INSOMNIA: ICD-10-CM

## 2022-02-07 RX ORDER — ESZOPICLONE 1 MG/1
1 TABLET, FILM COATED ORAL NIGHTLY PRN
Qty: 30 TABLET | Refills: 0 | Status: ON HOLD | OUTPATIENT
Start: 2022-02-07 | End: 2022-03-09

## 2022-02-07 NOTE — TELEPHONE ENCOUNTER
Controlled Substance Monitoring:    Acute and Chronic Pain Monitoring:   RX Monitoring 2/7/2022   Attestation -   Periodic Controlled Substance Monitoring No signs of potential drug abuse or diversion identified. OK for refill.   ES

## 2022-02-07 NOTE — TELEPHONE ENCOUNTER
From: Phoenix Summers  To: Dr. Neris Bueno: 2/7/2022 11:01 AM EST  Subject: Eszopiclone 1mg    I had a prescription for this that I took only after sleep loss (insomnia)was to the point I needed a good nights rest. With Светлана Buoy now adding to my sleep loss and discomfort, I have used up my supply. Can I get a refill? Dr. Stephon Pineda has no problem with it but said I should   reach out to you as the prescriber. Thank you, Parish Edwards.  [birth year]

## 2022-02-17 ENCOUNTER — HOSPITAL ENCOUNTER (OUTPATIENT)
Age: 67
Setting detail: OUTPATIENT SURGERY
Discharge: HOME | End: 2022-02-17
Attending: INTERNAL MEDICINE | Admitting: INTERNAL MEDICINE
Payer: MEDICARE

## 2022-02-17 VITALS
WEIGHT: 180.4 LBS | RESPIRATION RATE: 16 BRPM | OXYGEN SATURATION: 95 % | SYSTOLIC BLOOD PRESSURE: 132 MMHG | HEART RATE: 66 BPM | BODY MASS INDEX: 27.34 KG/M2 | TEMPERATURE: 96.8 F | DIASTOLIC BLOOD PRESSURE: 79 MMHG | HEIGHT: 68 IN

## 2022-02-17 PROCEDURE — 3609015500 HC GASTRIC/DUODENAL MOTILITY &/OR MANOMETRY STUDY: Performed by: INTERNAL MEDICINE

## 2022-02-17 ASSESSMENT — PAIN - FUNCTIONAL ASSESSMENT: PAIN_FUNCTIONAL_ASSESSMENT: 0-10

## 2022-02-17 NOTE — PROGRESS NOTES
Patient admitted to the endoscopy department for EFT/ 24   Consent signed. Manometry probe passed through the Left nare into the stomach. Liquid swallows performed at 53cm. Discharge instructions given to patient, understanding verbalized. Patient tolerated procedure. Patient discharged home per self.

## 2022-06-06 DIAGNOSIS — N40.1 BENIGN NON-NODULAR PROSTATIC HYPERPLASIA WITH LOWER URINARY TRACT SYMPTOMS: ICD-10-CM

## 2022-06-06 RX ORDER — TAMSULOSIN HYDROCHLORIDE 0.4 MG/1
CAPSULE ORAL
Qty: 180 CAPSULE | Refills: 3 | Status: SHIPPED | OUTPATIENT
Start: 2022-06-06

## 2022-06-06 NOTE — TELEPHONE ENCOUNTER
This medication refill is regarding a electronic request.  Refill requested by [facility]. Requested Prescriptions     Pending Prescriptions Disp Refills    tamsulosin (FLOMAX) 0.4 mg capsule [Pharmacy Med Name: Tamsulosin HCl Oral Capsule 0.4 MG] 180 capsule 3     Sig: TAKE 2 CAPSULES BY MOUTH EVERY DAY     Date of last visit: 10/6/2021   Date of next visit: 3/2/2023  Date of last refill: 5/12/21 #180/3    Rx verified, ordered and set to EP.

## 2022-06-29 ENCOUNTER — NURSE ONLY (OUTPATIENT)
Dept: LAB | Age: 67
End: 2022-06-29

## 2022-06-30 ENCOUNTER — OFFICE VISIT (OUTPATIENT)
Dept: PULMONOLOGY | Age: 67
End: 2022-06-30
Payer: MEDICARE

## 2022-06-30 VITALS
SYSTOLIC BLOOD PRESSURE: 122 MMHG | WEIGHT: 184.6 LBS | TEMPERATURE: 97.6 F | HEART RATE: 80 BPM | OXYGEN SATURATION: 96 % | BODY MASS INDEX: 27.98 KG/M2 | DIASTOLIC BLOOD PRESSURE: 82 MMHG | HEIGHT: 68 IN

## 2022-06-30 DIAGNOSIS — G47.33 OSA ON CPAP: Primary | ICD-10-CM

## 2022-06-30 DIAGNOSIS — G47.09 OTHER INSOMNIA: ICD-10-CM

## 2022-06-30 DIAGNOSIS — Z99.89 OSA ON CPAP: Primary | ICD-10-CM

## 2022-06-30 LAB — IGA: 111 MG/DL (ref 70–400)

## 2022-06-30 PROCEDURE — G8417 CALC BMI ABV UP PARAM F/U: HCPCS | Performed by: PHYSICIAN ASSISTANT

## 2022-06-30 PROCEDURE — 1123F ACP DISCUSS/DSCN MKR DOCD: CPT | Performed by: PHYSICIAN ASSISTANT

## 2022-06-30 PROCEDURE — 99213 OFFICE O/P EST LOW 20 MIN: CPT | Performed by: PHYSICIAN ASSISTANT

## 2022-06-30 PROCEDURE — 3017F COLORECTAL CA SCREEN DOC REV: CPT | Performed by: PHYSICIAN ASSISTANT

## 2022-06-30 PROCEDURE — G8427 DOCREV CUR MEDS BY ELIG CLIN: HCPCS | Performed by: PHYSICIAN ASSISTANT

## 2022-06-30 PROCEDURE — 1036F TOBACCO NON-USER: CPT | Performed by: PHYSICIAN ASSISTANT

## 2022-06-30 RX ORDER — ESOMEPRAZOLE MAGNESIUM 40 MG/1
40 CAPSULE, DELAYED RELEASE ORAL
Status: ON HOLD | COMMUNITY

## 2022-06-30 RX ORDER — NORTRIPTYLINE HYDROCHLORIDE 25 MG/1
25 CAPSULE ORAL NIGHTLY
Status: ON HOLD | COMMUNITY

## 2022-06-30 ASSESSMENT — ENCOUNTER SYMPTOMS
CHEST TIGHTNESS: 0
ALLERGIC/IMMUNOLOGIC NEGATIVE: 1
COUGH: 0
STRIDOR: 0
SHORTNESS OF BREATH: 0
BACK PAIN: 0
EYES NEGATIVE: 1
NAUSEA: 0
DIARRHEA: 0
WHEEZING: 0

## 2022-06-30 NOTE — PROGRESS NOTES
[facility]      Major Gtz         [id number]  6/30/2022   Chief Complaint   Patient presents with    Follow-up     1 year SUNIL f/u         Pt of Dr. Luisito Godfrey     PAP Download:   Original or initial AHI: 28.4     Date of initial study: 11/2013      Compliant  97%     Noncompliant 3 %     PAP Type Airsense 10 autoset Level  9   Avg Hrs/Day 5 hrs 39 mins   AHI: 0.4   Recorded compliance dates 5/30/22-6/28/22  Machine/Mfg:   [x] ResMed    [] Respironics/Dreamstation   Interface:   [x] Nasal    [] Nasal pillows   [] FFM      Provider:      [x] [facility]     []Estelle     [] Karri    [] Shankar Carmona    [] Campbellietermans               [] [facility]      [] Adaptive    [] [address]      [] Other    Neck Size: 16.5  Mallampati Mallampati 1  ESS:  10  SAQLI: 76    Here is a scan of the most recent download:          Presentation:   Melvin Robles presents for sleep medicine follow up for obstructive sleep apnea  Since the last visit, Melvin Robles is doing well with PAP. He is having esophageal motility issues. He is taking nortriptyline and melatonin at night for insomnia. He is having dry mouth at night. Equipment issues: The pressure is  acceptable, the mask is acceptable     Sleep issues:  Do you feel better? Yes  More rested? Yes   Better concentration? yes    Progress History:   Since last visit any new medical issues? Yes esophageal issues  New ER or hospital visits? No  Any new or changes in medicines? No  Any new sleep medicines? No    Review of Systems -   Review of Systems   Constitutional: Negative for activity change, appetite change, chills and fever. HENT: Negative for congestion and postnasal drip. Eyes: Negative. Respiratory: Negative for cough, chest tightness, shortness of breath, wheezing and stridor. Cardiovascular: Negative for chest pain and leg swelling. Gastrointestinal: Negative for diarrhea and nausea. Endocrine: Negative. Genitourinary: Negative. Musculoskeletal: Negative. Negative for arthralgias and back pain. Skin: Negative. Allergic/Immunologic: Negative. Neurological: Negative. Negative for dizziness and light-headedness. Psychiatric/Behavioral: Negative. All other systems reviewed and are negative. Physical Exam:    BMI:  Body mass index is 28.07 kg/m². Wt Readings from Last 3 Encounters:   02/17/22 180 lb 6.4 oz (81.8 kg)   06/30/22 184 lb 9.6 oz (83.7 kg)   10/06/21 171 lb (77.6 kg)     Weight stable / unchanged  Vitals: /82 (Site: Left Upper Arm, Position: Sitting, Cuff Size: Medium Adult)   Pulse 80   Temp 97.6 °F (36.4 °C) (Temporal)   Ht 5' 8\" (1.727 m)   Wt 184 lb 9.6 oz (83.7 kg)   SpO2 96% Comment: on RA  BMI 28.07 kg/m²       Physical Exam  Constitutional:       Appearance: Normal appearance. He is normal weight. HENT:      Head: Normocephalic and atraumatic. Right Ear: External ear normal.      Left Ear: External ear normal.      Nose: Nose normal.   Eyes:      Extraocular Movements: Extraocular movements intact. Conjunctiva/sclera: Conjunctivae normal.      Pupils: Pupils are equal, round, and reactive to light. Pulmonary:      Effort: Pulmonary effort is normal.   Musculoskeletal:      Cervical back: Normal range of motion and neck supple. Neurological:      General: No focal deficit present. Mental Status: He is alert and oriented to person, place, and time. Psychiatric:         Attention and Perception: Attention and perception normal.         Mood and Affect: Mood and affect normal.         Speech: Speech normal.         Behavior: Behavior normal. Behavior is cooperative. Thought Content: Thought content normal.         Cognition and Memory: Cognition normal.         Judgment: Judgment normal.           ASSESSMENT/DIAGNOSIS     Diagnosis Orders   1. SUNIL on CPAP     2. Other insomnia              Plan   Do you need any equipment today?  Yes update supplies  - Add chin strap, if no improvement go to [facility]  - Download reviewed and discussed with patient  - He  was advised to continue current positive airway pressure therapy with above described pressure. - He  advised to keep good compliance with current recommended pressure to get optimal results and clinical improvement  - Recommend 7-9 hours of sleep with PAP  - He was advised to call [employer] regarding supplies if needed.   -He call my office for earlier appointment if needed for worsening of sleep symptoms.   - He was instructed on weight loss  - Slade Sol was educated about my impression and plan. Patient verbalizesunderstanding.   We will see Geetha Koenig back in: 1 year with download    Information added by my medical assistant/LPN was reviewed today         Shreyas Bundy PA-C, MPAS  6/30/2022

## 2022-07-01 LAB
TISSUE TRANSGLUTAMINASE IGA: 0.2 U/ML
TTG, IGG: < 0.6 U/ML

## 2022-09-21 DIAGNOSIS — E78.5 HYPERLIPIDEMIA, UNSPECIFIED HYPERLIPIDEMIA TYPE: ICD-10-CM

## 2022-09-21 NOTE — LETTER
[address] [address]  Phone: [phone]  Fax: [phone]    Nickie Clifford MD        September 28, 2022    [address] [place] [facility] [address]      Dear Martha Araujo:    I have been unable to reach this patient by phone. A letter is being sent. If you have any questions or concerns, please don't hesitate to call.     Sincerely,        Nickie Clifford MD

## 2022-09-22 NOTE — TELEPHONE ENCOUNTER
Please confirm pt's current dose, as there is a discrepancy in the chart. Thanks. Also, pt due for annual appt at this time- resident schedule ok on Thursday afternoon.   ES

## 2022-09-22 NOTE — TELEPHONE ENCOUNTER
Patient's last appointment was : 10/6/2021  Patient's next appointment is :   Future Appointments   Date Time Provider Jenelle Haney   3/2/2023  9:30 AM Sandy Lora MD [facility] [place]   6/27/2023 11:15 AM [facility]     Last refilled:5/12/21    Lab Results   Component Value Date    LABA1C 5.5 10/09/2021     Lab Results   Component Value Date    CHOL 163 10/09/2021    TRIG 160 10/09/2021    HDL 50 10/09/2021    LDLCALC 81 10/09/2021    LDLDIRECT 91 04/09/2013     Lab Results   Component Value Date     10/09/2021    K 4.3 10/09/2021     10/09/2021    CO2 33 10/09/2021    BUN 12 02/11/2022    CREATININE 0.9 02/11/2022    GLUCOSE 105 10/09/2021    CALCIUM 9.6 10/09/2021    PROT 6.7 10/09/2021    LABALBU 4.7 10/09/2021    BILITOT 1.1 10/09/2021    ALKPHOS 104 10/09/2021    AST 25 10/09/2021    ALT 26 10/09/2021    LABGLOM 84 (A) 02/11/2022     Lab Results   Component Value Date    TSH 1.580 01/11/2022    T4FREE 1.06 01/11/2022     Lab Results   Component Value Date    WBC 6.1 03/01/2021    HGB 15.1 03/01/2021    HCT 45.7 03/01/2021    MCV 94.0 03/01/2021     03/01/2021

## 2022-09-28 ENCOUNTER — TELEPHONE (OUTPATIENT)
Dept: FAMILY MEDICINE CLINIC | Age: 67
End: 2022-09-28

## 2022-09-28 DIAGNOSIS — G47.33 OBSTRUCTIVE SLEEP APNEA ON CPAP: ICD-10-CM

## 2022-09-28 DIAGNOSIS — E78.5 HYPERLIPIDEMIA, UNSPECIFIED HYPERLIPIDEMIA TYPE: ICD-10-CM

## 2022-09-28 DIAGNOSIS — Z99.89 OBSTRUCTIVE SLEEP APNEA ON CPAP: ICD-10-CM

## 2022-09-28 DIAGNOSIS — Z12.5 SCREENING PSA (PROSTATE SPECIFIC ANTIGEN): ICD-10-CM

## 2022-09-28 DIAGNOSIS — R73.01 IFG (IMPAIRED FASTING GLUCOSE): Primary | ICD-10-CM

## 2022-09-28 RX ORDER — ATORVASTATIN CALCIUM 10 MG/1
TABLET, FILM COATED ORAL
Qty: 90 TABLET | Refills: 3 | Status: SHIPPED | OUTPATIENT
Start: 2022-09-28

## 2022-09-28 NOTE — TELEPHONE ENCOUNTER
Check Hg A1c, FLP, CMP, free T4/TSH, CBC, and PSA. Order signed. Patient may need printed copy, depending on which lab he chooses. However, patient may want to wait and get labs drawn after October 9, as Medicare may not pay if done before 365 days has paased since last lab draw. Please let patient know this.   ES

## 2022-09-28 NOTE — TELEPHONE ENCOUNTER
Called and spoke to [address] [place] [address]. Julian Santiago was not at home when I called. She is going to let the decision up to him. We can either mail the labs and he can get done a day or 2 after the appt, or reschedule the appointment for a later date? He will call us back with his decision.

## 2022-10-05 SDOH — HEALTH STABILITY: PHYSICAL HEALTH: ON AVERAGE, HOW MANY MINUTES DO YOU ENGAGE IN EXERCISE AT THIS LEVEL?: 60 MIN

## 2022-10-05 SDOH — HEALTH STABILITY: PHYSICAL HEALTH: ON AVERAGE, HOW MANY DAYS PER WEEK DO YOU ENGAGE IN MODERATE TO STRENUOUS EXERCISE (LIKE A BRISK WALK)?: 3 DAYS

## 2022-10-05 ASSESSMENT — PATIENT HEALTH QUESTIONNAIRE - PHQ9
SUM OF ALL RESPONSES TO PHQ QUESTIONS 1-9: 1
2. FEELING DOWN, DEPRESSED OR HOPELESS: 0
1. LITTLE INTEREST OR PLEASURE IN DOING THINGS: 1
SUM OF ALL RESPONSES TO PHQ QUESTIONS 1-9: 1
SUM OF ALL RESPONSES TO PHQ9 QUESTIONS 1 & 2: 1

## 2022-10-05 ASSESSMENT — LIFESTYLE VARIABLES
HOW OFTEN DURING THE LAST YEAR HAVE YOU BEEN UNABLE TO REMEMBER WHAT HAPPENED THE NIGHT BEFORE BECAUSE YOU HAD BEEN DRINKING: 0
HOW OFTEN DURING THE LAST YEAR HAVE YOU FOUND THAT YOU WERE NOT ABLE TO STOP DRINKING ONCE YOU HAD STARTED: 0
HAS A RELATIVE, FRIEND, DOCTOR, OR ANOTHER HEALTH PROFESSIONAL EXPRESSED CONCERN ABOUT YOUR DRINKING OR SUGGESTED YOU CUT DOWN: 0
HAVE YOU OR SOMEONE ELSE BEEN INJURED AS A RESULT OF YOUR DRINKING: 0
HOW MANY STANDARD DRINKS CONTAINING ALCOHOL DO YOU HAVE ON A TYPICAL DAY: 1 OR 2
HOW OFTEN DO YOU HAVE A DRINK CONTAINING ALCOHOL: 4 OR MORE TIMES A WEEK
HOW OFTEN DURING THE LAST YEAR HAVE YOU NEEDED AN ALCOHOLIC DRINK FIRST THING IN THE MORNING TO GET YOURSELF GOING AFTER A NIGHT OF HEAVY DRINKING: 0
HOW OFTEN DURING THE LAST YEAR HAVE YOU HAD A FEELING OF GUILT OR REMORSE AFTER DRINKING: 0
HOW OFTEN DURING THE LAST YEAR HAVE YOU FAILED TO DO WHAT WAS NORMALLY EXPECTED FROM YOU BECAUSE OF DRINKING: 0

## 2022-10-06 ENCOUNTER — TELEPHONE (OUTPATIENT)
Dept: FAMILY MEDICINE CLINIC | Age: 67
End: 2022-10-06

## 2022-10-06 ASSESSMENT — LIFESTYLE VARIABLES
HOW OFTEN DURING THE LAST YEAR HAVE YOU NEEDED AN ALCOHOLIC DRINK FIRST THING IN THE MORNING TO GET YOURSELF GOING AFTER A NIGHT OF HEAVY DRINKING: NEVER
HOW MANY STANDARD DRINKS CONTAINING ALCOHOL DO YOU HAVE ON A TYPICAL DAY: 1
HOW OFTEN DURING THE LAST YEAR HAVE YOU FOUND THAT YOU WERE NOT ABLE TO STOP DRINKING ONCE YOU HAD STARTED: NEVER
HOW OFTEN DURING THE LAST YEAR HAVE YOU BEEN UNABLE TO REMEMBER WHAT HAPPENED THE NIGHT BEFORE BECAUSE YOU HAD BEEN DRINKING: NEVER
HOW OFTEN DURING THE LAST YEAR HAVE YOU HAD A FEELING OF GUILT OR REMORSE AFTER DRINKING: NEVER
HAVE YOU OR SOMEONE ELSE BEEN INJURED AS A RESULT OF YOUR DRINKING: NO
HOW OFTEN DO YOU HAVE SIX OR MORE DRINKS ON ONE OCCASION: 1
HOW OFTEN DURING THE LAST YEAR HAVE YOU FAILED TO DO WHAT WAS NORMALLY EXPECTED FROM YOU BECAUSE OF DRINKING: NEVER
HAS A RELATIVE, FRIEND, DOCTOR, OR ANOTHER HEALTH PROFESSIONAL EXPRESSED CONCERN ABOUT YOUR DRINKING OR SUGGESTED YOU CUT DOWN: NO
HOW OFTEN DO YOU HAVE A DRINK CONTAINING ALCOHOL: 5

## 2022-10-06 NOTE — TELEPHONE ENCOUNTER
Please check with patient. Prescription for atorvastatin (Lipitor) 10 mg - 1 pill daily was sent over to [facility] on September 28, 2022 for 90 pills with 3 refills.   ES

## 2022-10-06 NOTE — TELEPHONE ENCOUNTER
Patient was in office for a visit. He states he was supposed to get a Statin from the pharmacy and it is not ready yet. 1 pill 10MG daily. [facility]

## 2022-10-06 NOTE — TELEPHONE ENCOUNTER
Poke with pharmacy. They did not receive script. This nurse gave verbal order for atorvastatin 10 mg, 90 tabs, 3 refills.  Notified patient

## 2022-10-07 ENCOUNTER — NURSE ONLY (OUTPATIENT)
Dept: LAB | Age: 67
End: 2022-10-07

## 2022-10-07 DIAGNOSIS — Z99.89 OBSTRUCTIVE SLEEP APNEA ON CPAP: ICD-10-CM

## 2022-10-07 DIAGNOSIS — Z12.5 SCREENING PSA (PROSTATE SPECIFIC ANTIGEN): ICD-10-CM

## 2022-10-07 DIAGNOSIS — E78.5 HYPERLIPIDEMIA, UNSPECIFIED HYPERLIPIDEMIA TYPE: ICD-10-CM

## 2022-10-07 DIAGNOSIS — G47.33 OBSTRUCTIVE SLEEP APNEA ON CPAP: ICD-10-CM

## 2022-10-07 DIAGNOSIS — R73.01 IFG (IMPAIRED FASTING GLUCOSE): ICD-10-CM

## 2022-10-07 LAB
ALBUMIN SERPL-MCNC: 4.1 G/DL (ref 3.5–5.1)
ALP BLD-CCNC: 92 U/L (ref 38–126)
ALT SERPL-CCNC: 16 U/L (ref 11–66)
ANION GAP SERPL CALCULATED.3IONS-SCNC: 10 MEQ/L (ref 8–16)
AST SERPL-CCNC: 18 U/L (ref 5–40)
AVERAGE GLUCOSE: 108 MG/DL (ref 70–126)
BASOPHILS # BLD: 0.3 %
BASOPHILS ABSOLUTE: 0 THOU/MM3 (ref 0–0.1)
BILIRUB SERPL-MCNC: 1.2 MG/DL (ref 0.3–1.2)
BUN BLDV-MCNC: 19 MG/DL (ref 7–22)
CALCIUM SERPL-MCNC: 9.6 MG/DL (ref 8.5–10.5)
CHLORIDE BLD-SCNC: 101 MEQ/L (ref 98–111)
CHOLESTEROL, TOTAL: 202 MG/DL (ref 100–199)
CO2: 30 MEQ/L (ref 23–33)
CREAT SERPL-MCNC: 0.9 MG/DL (ref 0.4–1.2)
EOSINOPHIL # BLD: 1.7 %
EOSINOPHILS ABSOLUTE: 0.1 THOU/MM3 (ref 0–0.4)
ERYTHROCYTE [DISTWIDTH] IN BLOOD BY AUTOMATED COUNT: 12.5 % (ref 11.5–14.5)
ERYTHROCYTE [DISTWIDTH] IN BLOOD BY AUTOMATED COUNT: 42.8 FL (ref 35–45)
GFR SERPL CREATININE-BSD FRML MDRD: 84 ML/MIN/1.73M2
GLUCOSE BLD-MCNC: 103 MG/DL (ref 70–108)
HBA1C MFR BLD: 5.6 % (ref 4.4–6.4)
HCT VFR BLD CALC: 45.7 % (ref 42–52)
HDLC SERPL-MCNC: 52 MG/DL
HEMOGLOBIN: 15.4 GM/DL (ref 14–18)
IMMATURE GRANS (ABS): 0.02 THOU/MM3 (ref 0–0.07)
IMMATURE GRANULOCYTES: 0.3 %
LDL CHOLESTEROL CALCULATED: 118 MG/DL
LYMPHOCYTES # BLD: 23.8 %
LYMPHOCYTES ABSOLUTE: 1.4 THOU/MM3 (ref 1–4.8)
MCH RBC QN AUTO: 31.3 PG (ref 26–33)
MCHC RBC AUTO-ENTMCNC: 33.7 GM/DL (ref 32.2–35.5)
MCV RBC AUTO: 92.9 FL (ref 80–94)
MONOCYTES # BLD: 8 %
MONOCYTES ABSOLUTE: 0.5 THOU/MM3 (ref 0.4–1.3)
NUCLEATED RED BLOOD CELLS: 0 /100 WBC
PLATELET # BLD: 197 THOU/MM3 (ref 130–400)
PMV BLD AUTO: 10.8 FL (ref 9.4–12.4)
POTASSIUM SERPL-SCNC: 4.4 MEQ/L (ref 3.5–5.2)
PROSTATE SPECIFIC ANTIGEN: 2.15 NG/ML (ref 0–1)
RBC # BLD: 4.92 MILL/MM3 (ref 4.7–6.1)
SEG NEUTROPHILS: 65.9 %
SEGMENTED NEUTROPHILS ABSOLUTE COUNT: 3.8 THOU/MM3 (ref 1.8–7.7)
SODIUM BLD-SCNC: 141 MEQ/L (ref 135–145)
T4 FREE: 0.98 NG/DL (ref 0.93–1.76)
TOTAL PROTEIN: 6.6 G/DL (ref 6.1–8)
TRIGL SERPL-MCNC: 161 MG/DL (ref 0–199)
TSH SERPL DL<=0.05 MIU/L-ACNC: 2.43 UIU/ML (ref 0.4–4.2)
WBC # BLD: 5.8 THOU/MM3 (ref 4.8–10.8)

## 2022-10-13 ENCOUNTER — OFFICE VISIT (OUTPATIENT)
Dept: FAMILY MEDICINE CLINIC | Age: 67
End: 2022-10-13
Payer: MEDICARE

## 2022-10-13 VITALS
RESPIRATION RATE: 12 BRPM | WEIGHT: 187.8 LBS | SYSTOLIC BLOOD PRESSURE: 138 MMHG | HEART RATE: 68 BPM | BODY MASS INDEX: 28.46 KG/M2 | HEIGHT: 68 IN | DIASTOLIC BLOOD PRESSURE: 82 MMHG | TEMPERATURE: 98.5 F | OXYGEN SATURATION: 98 %

## 2022-10-13 DIAGNOSIS — E78.5 HYPERLIPIDEMIA, UNSPECIFIED HYPERLIPIDEMIA TYPE: ICD-10-CM

## 2022-10-13 DIAGNOSIS — G47.09 OTHER INSOMNIA: ICD-10-CM

## 2022-10-13 DIAGNOSIS — N52.9 ERECTILE DYSFUNCTION, UNSPECIFIED ERECTILE DYSFUNCTION TYPE: ICD-10-CM

## 2022-10-13 DIAGNOSIS — Z00.00 MEDICARE ANNUAL WELLNESS VISIT, SUBSEQUENT: Primary | ICD-10-CM

## 2022-10-13 DIAGNOSIS — N40.1 BENIGN NON-NODULAR PROSTATIC HYPERPLASIA WITH LOWER URINARY TRACT SYMPTOMS: ICD-10-CM

## 2022-10-13 PROCEDURE — G8484 FLU IMMUNIZE NO ADMIN: HCPCS | Performed by: STUDENT IN AN ORGANIZED HEALTH CARE EDUCATION/TRAINING PROGRAM

## 2022-10-13 PROCEDURE — 3017F COLORECTAL CA SCREEN DOC REV: CPT | Performed by: STUDENT IN AN ORGANIZED HEALTH CARE EDUCATION/TRAINING PROGRAM

## 2022-10-13 PROCEDURE — G8427 DOCREV CUR MEDS BY ELIG CLIN: HCPCS | Performed by: STUDENT IN AN ORGANIZED HEALTH CARE EDUCATION/TRAINING PROGRAM

## 2022-10-13 PROCEDURE — G0439 PPPS, SUBSEQ VISIT: HCPCS | Performed by: STUDENT IN AN ORGANIZED HEALTH CARE EDUCATION/TRAINING PROGRAM

## 2022-10-13 PROCEDURE — 1036F TOBACCO NON-USER: CPT | Performed by: STUDENT IN AN ORGANIZED HEALTH CARE EDUCATION/TRAINING PROGRAM

## 2022-10-13 PROCEDURE — G8417 CALC BMI ABV UP PARAM F/U: HCPCS | Performed by: STUDENT IN AN ORGANIZED HEALTH CARE EDUCATION/TRAINING PROGRAM

## 2022-10-13 PROCEDURE — 99213 OFFICE O/P EST LOW 20 MIN: CPT | Performed by: STUDENT IN AN ORGANIZED HEALTH CARE EDUCATION/TRAINING PROGRAM

## 2022-10-13 PROCEDURE — 1123F ACP DISCUSS/DSCN MKR DOCD: CPT | Performed by: STUDENT IN AN ORGANIZED HEALTH CARE EDUCATION/TRAINING PROGRAM

## 2022-10-13 RX ORDER — ESZOPICLONE 1 MG/1
1 TABLET, FILM COATED ORAL NIGHTLY
Qty: 30 TABLET | Refills: 0 | Status: SHIPPED | OUTPATIENT
Start: 2022-10-13 | End: 2022-11-12

## 2022-10-13 RX ORDER — TADALAFIL 10 MG/1
10 TABLET ORAL PRN
Qty: 30 TABLET | Refills: 0 | Status: SHIPPED | OUTPATIENT
Start: 2022-10-13

## 2022-10-13 SDOH — ECONOMIC STABILITY: FOOD INSECURITY: WITHIN THE PAST 12 MONTHS, YOU WORRIED THAT YOUR FOOD WOULD RUN OUT BEFORE YOU GOT MONEY TO BUY MORE.: NEVER TRUE

## 2022-10-13 SDOH — ECONOMIC STABILITY: FOOD INSECURITY: WITHIN THE PAST 12 MONTHS, THE FOOD YOU BOUGHT JUST DIDN'T LAST AND YOU DIDN'T HAVE MONEY TO GET MORE.: NEVER TRUE

## 2022-10-13 ASSESSMENT — PATIENT HEALTH QUESTIONNAIRE - PHQ9
SUM OF ALL RESPONSES TO PHQ QUESTIONS 1-9: 1
2. FEELING DOWN, DEPRESSED OR HOPELESS: 0
1. LITTLE INTEREST OR PLEASURE IN DOING THINGS: 1
SUM OF ALL RESPONSES TO PHQ9 QUESTIONS 1 & 2: 1
SUM OF ALL RESPONSES TO PHQ QUESTIONS 1-9: 1

## 2022-10-13 ASSESSMENT — SOCIAL DETERMINANTS OF HEALTH (SDOH): HOW HARD IS IT FOR YOU TO PAY FOR THE VERY BASICS LIKE FOOD, HOUSING, MEDICAL CARE, AND HEATING?: NOT HARD AT ALL

## 2022-10-13 NOTE — PATIENT INSTRUCTIONS
Personalized Preventive Plan for Neema Guest - 10/13/2022  Medicare offers a range of preventive health benefits. Some of the tests and screenings are paid in full while other may be subject to a deductible, co-insurance, and/or copay. Some of these benefits include a comprehensive review of your medical history including lifestyle, illnesses that may run in your family, and various assessments and screenings as appropriate. After reviewing your medical record and screening and assessments performed today your provider may have ordered immunizations, labs, imaging, and/or referrals for you. A list of these orders (if applicable) as well as your Preventive Care list are included within your After Visit Summary for your review. Other Preventive Recommendations:    A preventive eye exam performed by an eye specialist is recommended every 1-2 years to screen for glaucoma; cataracts, macular degeneration, and other eye disorders. A preventive dental visit is recommended every 6 months. Try to get at least 150 minutes of exercise per week or 10,000 steps per day on a pedometer . Order or download the FREE \"Exercise & Physical Activity: Your Everyday Guide\" from [employer]. Call 4-164.158.8270 or search [employer] online. You need 4894-6101 mg of calcium and 4181-0297 IU of vitamin D per day. It is possible to meet your calcium requirement with diet alone, but a vitamin D supplement is usually necessary to meet this goal.  When exposed to the sun, use a sunscreen that protects against both UVA and UVB radiation with an SPF of 30 or greater. Reapply every 2 to 3 hours or after sweating, drying off with a towel, or swimming. Always wear a seat belt when traveling in a car. Always wear a helmet when riding a bicycle or motorcycle.

## 2022-10-13 NOTE — PROGRESS NOTES
S: 78 y.o. male with   Chief Complaint   Patient presents with    Medicare AWV     Pt presents for a medicare AWV. He would like to discuss that he has lost a toenail twice from injury. HPI: please see resident note for HPI and ROS. 69-year-old male with history of BPH, ED, SUNIL, and hyperlipidemia here for annual wellness visit. No red flags on screening questions. Declines flu vaccine today. Cholesterol just slightly elevated on recent labs-on statin therapy. Continues to have worsening BPH symptoms on Flomax 0.8 mg daily. Also having erectile dysfunction-on Cialis 5 mg as needed, though this is not working well for him. Patient also complains of insomnia for which she has taken Lunesta 1 mg at bedtime as needed. States that 30 tablets last him about 1 year. Asking for a refill. BP Readings from Last 3 Encounters:   02/17/22 132/79   10/13/22 138/82   06/30/22 122/82     Wt Readings from Last 3 Encounters:   02/17/22 180 lb 6.4 oz (81.8 kg)   10/13/22 187 lb 12.8 oz (85.2 kg)   06/30/22 184 lb 9.6 oz (83.7 kg)       O: VS:  height is 5' 8\" (1.727 m) and weight is 187 lb 12.8 oz (85.2 kg). His temperature is 98.5 °F (36.9 °C). His blood pressure is 138/82 and his pulse is 68. His respiration is 12 and oxygen saturation is 98%. AAO/NAD, appropriate affect for mood     Diagnosis Orders   1. Medicare annual wellness visit, subsequent        2. Benign non-nodular prostatic hyperplasia with lower urinary tract symptoms  [facility]    tadalafil (CIALIS) 10 MG tablet      3. Erectile dysfunction, unspecified erectile dysfunction type  tadalafil (CIALIS) 10 MG tablet      4. Other insomnia  eszopiclone (LUNESTA) 1 MG TABS      5. Hyperlipidemia, unspecified hyperlipidemia type            Plan:  Please refer to resident note for full plan. 69-year-old male here for annual wellness visit. BPH: Chronic, uncontrolled. Continue Flomax as prescribed.   Plan to refer to urology for further management due to refractory symptoms. ED: Chronic, uncontrolled. Plan to increase Cialis to 10 mg as needed. Can discuss further with urologist once established. Insomnia: Chronic, stable. PDMP reviewed and appropriate. Has been on Lunesta, 30 tablets lasting about 1 year. OK for refill at this time. Hyperlipidemia: Chronic, stable. Continue statin therapy as prescribed. Follow up in 6 months as scheduled. Health Maintenance Due   Topic Date Due    COVID-19 Vaccine (3 - Booster for Moderna series) 08/23/2021    Flu vaccine (1) Never done       Attending Physician Statement  I have discussed the case, including pertinent history and exam findings with the resident. I also have seen the patient and performed key portions of the examination. I agree with the documented assessment and plan as documented by the resident.         Anabel Loza,  10/13/2022 2:08 PM

## 2022-10-13 NOTE — PROGRESS NOTES
Medicare Annual Wellness Visit    Liam Catherine is here for Medicare AWV (Pt presents for a medicare AWV. He would like to discuss that he has lost a toenail twice from injury. )    Assessment & Plan   Medicare annual wellness visit, subsequent  Benign non-nodular prostatic hyperplasia with lower urinary tract symptoms  -     [facility]. [facility]  -     tadalafil (CIALIS) 10 MG tablet; Take 1 tablet by mouth as needed for Erectile Dysfunction, Disp-30 tablet, R-0Normal  Erectile dysfunction, unspecified erectile dysfunction type  -     tadalafil (CIALIS) 10 MG tablet; Take 1 tablet by mouth as needed for Erectile Dysfunction, Disp-30 tablet, R-0Normal  Other insomnia  -     eszopiclone (LUNESTA) 1 MG TABS; Take 1 tablet by mouth at bedtime for 30 days. , Disp-30 tablet, R-0Normal  Hyperlipidemia, unspecified hyperlipidemia type        Will refer to urology for ongoing symptoms related to BPH despite treatment  Increase Cialis to 10 mg as needed  Refill Lunesta, PDMP reviewed and appropriate  Lipid mildly elevated on recent labs - encourage diet and exercise modification    Patient declined influenza vaccine, received COVID booster 2 months ago. Keep appointment in 6 months with Dr. Pau Lechuga, follow-up in 1 year for annual wellness exam    Recommendations for Preventive Services Due: see orders and patient instructions/AVS.  Recommended screening schedule for the next 5-10 years is provided to the patient in written form: see Patient Instructions/AVS.     Return in 6 months (on 4/13/2023) for as planned with Dr. Pau Lechuga. Subjective   The following acute and/or chronic problems were also addressed today:    BPH: Patient reports ongoing urinary frequency and urgency, especially in the mornings. He is taking 0.8 mg Flomax daily without symptom improvement. Recent PSA elevated at 2.15, overall unchanged from prior.     ED: He has been taking Cialis as needed, and recently has had more difficulty maintaining an erection. He increased Cialis to 10 mg with some improvement, but does still report difficulty maintaining erections. Insomnia: Patient has chronic history of insomnia. He has SUNIL and uses CPAP nightly. He has used [place] as needed, which is helpful. He is continue to work on sleep hygiene improvement as well. Patient's complete Health Risk Assessment and screening values have been reviewed and are found in Flowsheets. The following problems were reviewed today and where indicated follow up appointments were made and/or referrals ordered. Positive Risk Factor Screenings with Interventions:                  No Positive Risk Factors identified today. Objective   Vitals:    10/13/22 1302   BP: 138/82   Pulse: 68   Resp: 12   Temp: 98.5 °F (36.9 °C)   SpO2: 98%   Weight: 187 lb 12.8 oz (85.2 kg)   Height: 5' 8\" (1.727 m)      Body mass index is 28.55 kg/m².       General Appearance: alert and oriented to person, place and time, well developed and well- nourished, in no acute distress  Skin: warm and dry, no rash or erythema  Head: normocephalic and atraumatic  Eyes: pupils equal, round, and reactive to light, extraocular eye movements intact, conjunctivae normal  ENT: tympanic membrane, external ear and ear canal normal bilaterally, nose without deformity, nasal mucosa and turbinates normal without polyps  Neck: supple and non-tender without mass, no thyromegaly or thyroid nodules, no cervical lymphadenopathy  Pulmonary/Chest: clear to auscultation bilaterally- no wheezes, rales or rhonchi, normal air movement, no respiratory distress  Cardiovascular: normal rate, regular rhythm, normal S1 and S2, no murmurs, rubs, clicks, or gallops, distal pulses intact, no carotid bruits  Abdomen: soft, non-tender, non-distended, normal bowel sounds, no masses or organomegaly  Extremities: no cyanosis, clubbing or edema  Musculoskeletal: normal range of motion, no joint swelling, deformity or tenderness  Neurologic: reflexes normal and symmetric, no cranial nerve deficit, gait, coordination and speech normal       Allergies   Allergen Reactions    Seasonal      hayfever per pt. Prior to Visit Medications    Medication Sig Taking? Authorizing Provider   tadalafil (CIALIS) 10 MG tablet Take 1 tablet by mouth as needed for Erectile Dysfunction Yes Ofelia Eaton MD   eszopiclone (LUNESTA) 1 MG TABS Take 1 tablet by mouth at bedtime for 30 days. Yes Ofelia Eaton MD   atorvastatin (LIPITOR) 10 MG tablet TAKE 1 TABLET BY MOUTH EVERY DAY Yes Nga Fatima MD   nortriptyline (PAMELOR) 25 MG capsule Take 25 mg by mouth nightly Yes Historical Provider, MD   tamsulosin (FLOMAX) 0.4 mg capsule TAKE 2 CAPSULES BY MOUTH EVERY DAY Yes Nga Fatima MD   bismuth subsalicylate (PEPTO BISMOL) 262 MG/15ML suspension Take 15 mLs by mouth every 6 hours as needed Yes Historical Provider, MD   Simethicone 125 MG TABS  Yes Historical Provider, MD   Coenzyme Q10 (COQ-10) 100 MG CAPS Take by mouth Yes Historical Provider, MD   Probiotic Product (PROBIOTIC PO) Take by mouth daily  Yes Historical Provider, MD   fluticasone (FLONASE) 50 MCG/ACT nasal spray 2 sprays by Nasal route daily. Yes Nga Fatima MD   therapeutic multivitamin-minerals [facility]) tablet Take 1 tablet by mouth daily.  occasionally Yes Historical Provider, MD   esomeprazole (NEXIUM) 40 MG delayed release capsule Take 40 mg by mouth every morning (before breakfast)  Patient not taking: Reported on 10/13/2022  Historical ProviderMD WHITEHEADORMPETRA Hemp gummies  Patient not taking: Reported on 10/13/2022  Historical Provider, MD   dexlansoprazole (DEXILANT) 60 MG CPDR delayed release capsule Take 1 capsule by mouth every evening  Patient not taking: Reported on 10/13/2022  Historical Provider, MD [place] (Including outside providers/suppliers regularly involved in providing care):   Patient Care Team:  Nga Fatima MD as PCP - General (Family Medicine)  Dorian Euceda MD as PCP - [facility] Empaneled Provider     Reviewed and updated this visit:  Tobacco  Allergies  Meds  Problems  Med Hx  Surg Hx  Soc Hx  Fam Hx          Electronically signed by Destiney Medina MD on 10/13/2022 at 2:04 PM

## 2022-10-18 ENCOUNTER — OFFICE VISIT (OUTPATIENT)
Dept: UROLOGY | Age: 67
End: 2022-10-18
Payer: MEDICARE

## 2022-10-18 VITALS
SYSTOLIC BLOOD PRESSURE: 140 MMHG | DIASTOLIC BLOOD PRESSURE: 82 MMHG | HEIGHT: 68 IN | WEIGHT: 192 LBS | BODY MASS INDEX: 29.1 KG/M2

## 2022-10-18 DIAGNOSIS — N40.1 BENIGN LOCALIZED PROSTATIC HYPERPLASIA WITH LOWER URINARY TRACT SYMPTOMS (LUTS): Primary | ICD-10-CM

## 2022-10-18 DIAGNOSIS — N52.9 ERECTILE DYSFUNCTION, UNSPECIFIED ERECTILE DYSFUNCTION TYPE: ICD-10-CM

## 2022-10-18 PROCEDURE — 99204 OFFICE O/P NEW MOD 45 MIN: CPT | Performed by: UROLOGY

## 2022-10-18 PROCEDURE — 1036F TOBACCO NON-USER: CPT | Performed by: UROLOGY

## 2022-10-18 PROCEDURE — G8484 FLU IMMUNIZE NO ADMIN: HCPCS | Performed by: UROLOGY

## 2022-10-18 PROCEDURE — 3017F COLORECTAL CA SCREEN DOC REV: CPT | Performed by: UROLOGY

## 2022-10-18 PROCEDURE — 1123F ACP DISCUSS/DSCN MKR DOCD: CPT | Performed by: UROLOGY

## 2022-10-18 PROCEDURE — G8427 DOCREV CUR MEDS BY ELIG CLIN: HCPCS | Performed by: UROLOGY

## 2022-10-18 PROCEDURE — G8417 CALC BMI ABV UP PARAM F/U: HCPCS | Performed by: UROLOGY

## 2022-10-18 RX ORDER — OXYBUTYNIN CHLORIDE 10 MG/1
10 TABLET, EXTENDED RELEASE ORAL DAILY
Qty: 90 TABLET | Refills: 3 | Status: SHIPPED | OUTPATIENT
Start: 2022-10-18 | End: 2023-01-16

## 2022-10-18 NOTE — PROGRESS NOTES
John Allen MD.    [address] [place] [id number]  Dept: [phone]  Dept Fax: [phone]  Loc: [address] [facility] Urology Office Note -     Patient:  Ladarius Charlton  YOB: 1955    The patient is a 78 y.o. male who presents today for evaluation of the following problems:   Chief Complaint   Patient presents with    Benign Prostatic Hypertrophy     Frequency and urgency started over the summer. And is getting worse      referred/consultation requested by Daryl Clemente MD.    History of Present Illness:    BPH  Flomax for years  Auass 22- weak stream freq urgency     ED  Cialis 10 prn  Stable    Reviewed PSA      Requested/reviewed records from Daryl Clemente MD office and/or outside physician/EMR    (Patient's old records have been requested, reviewed and pertinent findings summarized in today's note.)    Procedures Today: N/A      Last several PSA's:  Lab Results   Component Value Date    PSA 2.15 (H) 10/07/2022    PSA 2.44 (H) 10/09/2021    PSA 0.98 01/29/2014       Last total testosterone:  No results found for: TESTOSTERONE    Urinalysis today:  No results found for this visit on 10/18/22. Last BUN and creatinine:  Lab Results   Component Value Date    BUN 19 10/07/2022     Lab Results   Component Value Date    CREATININE 0.9 10/07/2022         Imaging Reviewed during this Office Visit:   Blossom Valdivia MD independently reviewed the images and verified the radiology reports from:    No results found.     PAST MEDICAL, FAMILY AND SOCIAL HISTORY:  Past Medical History:   Diagnosis Date    Allergic     Allergic rhinitis     Arthritis     Back pain     Depression     GERD (gastroesophageal reflux disease)     Hyperlipidemia     Prolonged emergence from general anesthesia     Prostatism     Unspecified sleep apnea     has CPAP     Past Surgical History:   Procedure Laterality Date    CARDIAC CATHETERIZATION      COLONOSCOPY  2011    ESOPHAGEAL MOTILITY STUDY N/A 2/17/2022    ESOPHAGEAL MOTILITY/MANOMETRY STUDY performed by Atif Wellington MD at [address] [facility]    [address]AGEAL [address] N/A 2/16/2021    ESOPHAGEAL [address] (NO EGD) performed by Atif Wellington MD at [address] [facility] N/A 2/74/1328    ROBOTIC LINX, Hiatal hernia repair, Umbilical hernia repair. performed by Cher Cid MD at [address] [facility] [place]      tendonitus repair     RETINAL DETACHMENT SURGERY      UPPER GASTROINTESTINAL ENDOSCOPY N/A 1/15/2021    BRAVO performed by Tree Nagy MD at [place] [address]  1/15/2021    EGD BIOPSY performed by Tree Nagy MD at [address] [facility]    UPPER GASTROINTESTINAL ENDOSCOPY N/A 7/27/2021    EGD DILATION W/ RANDALL performed by Mundo Khan MD at [address] [facility]     Family History   Problem Relation Age of Onset    Arthritis Mother     Hearing Loss Mother     High Blood Pressure Mother     High Cholesterol Mother     Stroke Mother     Cancer Mother     Depression Father     Dementia Father     Arthritis Father     Diabetes Father     Birth Defects Sister     Diabetes Paternal Grandfather     High Blood Pressure Brother     Arthritis Brother     Other Brother         heart palpatations    Cancer Maternal Grandfather     Cancer Maternal Grandmother      Outpatient Medications Marked as Taking for the 10/18/22 encounter (Office Visit) with Azeb Jenkins MD   Medication Sig Dispense Refill    tadalafil (CIALIS) 10 MG tablet Take 1 tablet by mouth as needed for Erectile Dysfunction 30 tablet 0    eszopiclone (LUNESTA) 1 MG TABS Take 1 tablet by mouth at bedtime for 30 days.  30 tablet 0    atorvastatin (LIPITOR) 10 MG tablet TAKE 1 TABLET BY MOUTH EVERY DAY 90 tablet 3    nortriptyline (PAMELOR) 25 MG capsule Take 25 mg by mouth nightly      tamsulosin (FLOMAX) 0.4 mg capsule TAKE 2 CAPSULES BY MOUTH EVERY  capsule 3    bismuth subsalicylate (PEPTO BISMOL) 262 MG/15ML suspension Take 15 mLs by mouth every 6 hours as needed      Simethicone 125 MG TABS       Coenzyme Q10 (COQ-10) 100 MG CAPS Take by mouth      Probiotic Product (PROBIOTIC PO) Take by mouth daily       fluticasone (FLONASE) 50 MCG/ACT nasal spray 2 sprays by Nasal route daily. 3 Bottle 3    therapeutic multivitamin-minerals (THERAGRAN-M) tablet Take 1 tablet by mouth daily. occasionally         Seasonal  Social History     Tobacco Use   Smoking Status Never   Smokeless Tobacco Never      (If patient a smoker, smoking cessation counseling offered)   Social History     Substance and Sexual Activity   Alcohol Use Not Currently    Alcohol/week: 0.0 standard drinks       REVIEW OF SYSTEMS:  Constitutional: negative  Eyes: negative  Respiratory: negative  Cardiovascular: negative  Gastrointestinal: negative  Genitourinary: see HPI  Musculoskeletal: negative  Skin: negative   Neurological: negative  Hematological/Lymphatic: negative  Psychological: negative      Physical Exam:    This a 78 y.o. male  Vitals:    10/18/22 1512   BP: (!) 140/82     Body mass index is 29.19 kg/m². Constitutional: Patient in no acute distress;       Assessment and Plan        1. Benign localized prostatic hyperplasia with lower urinary tract symptoms (LUTS)    2. Erectile dysfunction, unspecified erectile dysfunction type               Plan:      BPH- worsening. On flomax 0.8 daily. Add ditropan for oab symptoms. Discussed dry mouth and constipation    ED- on cialis 10 PRN for ED. Reassess in three months        Prescriptions Ordered:  No orders of the defined types were placed in this encounter. Orders Placed:  No orders of the defined types were placed in this encounter.            Óscar Rodriguez MD

## 2022-12-05 ENCOUNTER — TELEPHONE (OUTPATIENT)
Dept: FAMILY MEDICINE CLINIC | Age: 67
End: 2022-12-05

## 2022-12-05 NOTE — TELEPHONE ENCOUNTER
Typically, given this is a viral infection, antibiotics are of no use for COVID-19. We generally recommend staying very well-hydrated (>96 ounces of water daily) as well as remaining mobile (would limit exercise however). We also recommend small, frequent meals and getting plenty of rest.  Tylenol or Advil can be used for aches, pains, and fever. Paxlovid (anti-viral medication used to decrease severity and duration of illness)  is indicated given pt's age. We can prescribe if pt desires. Let me know.   ES

## 2022-12-05 NOTE — TELEPHONE ENCOUNTER
Pt. Called stating he tested positive for covid yesterday 12/05/2022. He has a cough and his chest is a little sore from the cough. He is wondering what recommendations Dr. wSathi Bishop has for his symptoms. He said he has tried some over the counter things, but would like to have some recommendations. Please advise.

## 2023-01-17 ENCOUNTER — OFFICE VISIT (OUTPATIENT)
Dept: UROLOGY | Age: 68
End: 2023-01-17
Payer: MEDICARE

## 2023-01-17 VITALS — BODY MASS INDEX: 29.1 KG/M2 | HEIGHT: 68 IN | RESPIRATION RATE: 18 BRPM | WEIGHT: 192 LBS

## 2023-01-17 DIAGNOSIS — N52.9 ERECTILE DYSFUNCTION, UNSPECIFIED ERECTILE DYSFUNCTION TYPE: ICD-10-CM

## 2023-01-17 DIAGNOSIS — N40.1 BENIGN LOCALIZED PROSTATIC HYPERPLASIA WITH LOWER URINARY TRACT SYMPTOMS (LUTS): Primary | ICD-10-CM

## 2023-01-17 LAB
BILIRUBIN URINE: NEGATIVE
BLOOD URINE, POC: NEGATIVE
CHARACTER, URINE: CLEAR
COLOR, URINE: YELLOW
GLUCOSE URINE: NEGATIVE MG/DL
KETONES, URINE: NEGATIVE
LEUKOCYTE CLUMPS, URINE: NEGATIVE
NITRITE, URINE: NEGATIVE
PH, URINE: 7 (ref 5–9)
POST VOID RESIDUAL (PVR): 26 ML
PROTEIN, URINE: NEGATIVE MG/DL
SPECIFIC GRAVITY, URINE: 1.01 (ref 1–1.03)
UROBILINOGEN, URINE: 1 EU/DL (ref 0–1)

## 2023-01-17 PROCEDURE — 81003 URINALYSIS AUTO W/O SCOPE: CPT | Performed by: UROLOGY

## 2023-01-17 PROCEDURE — G8417 CALC BMI ABV UP PARAM F/U: HCPCS | Performed by: UROLOGY

## 2023-01-17 PROCEDURE — G8484 FLU IMMUNIZE NO ADMIN: HCPCS | Performed by: UROLOGY

## 2023-01-17 PROCEDURE — 3017F COLORECTAL CA SCREEN DOC REV: CPT | Performed by: UROLOGY

## 2023-01-17 PROCEDURE — G8427 DOCREV CUR MEDS BY ELIG CLIN: HCPCS | Performed by: UROLOGY

## 2023-01-17 PROCEDURE — 99214 OFFICE O/P EST MOD 30 MIN: CPT | Performed by: UROLOGY

## 2023-01-17 PROCEDURE — 51798 US URINE CAPACITY MEASURE: CPT | Performed by: UROLOGY

## 2023-01-17 PROCEDURE — 1036F TOBACCO NON-USER: CPT | Performed by: UROLOGY

## 2023-01-17 PROCEDURE — 1123F ACP DISCUSS/DSCN MKR DOCD: CPT | Performed by: UROLOGY

## 2023-01-17 RX ORDER — TADALAFIL 5 MG/1
5 TABLET ORAL DAILY
Qty: 30 TABLET | Refills: 11 | Status: SHIPPED | OUTPATIENT
Start: 2023-01-17

## 2023-01-17 NOTE — PROGRESS NOTES
Rekha Vegas MD.    [address] [facility] [id number]  Dept: [phone]  Dept Fax: [phone]: [address] [facility] Urology Office Note -     Patient:  Jayda Carpenter  YOB: 1955    The patient is a 75 y.o. male who presents today for evaluation of the following problems:   Chief Complaint   Patient presents with    Benign Prostatic Hypertrophy    Erectile Dysfunction    referred/consultation requested by Naresh Seymour MD.    History of Present Illness:    BPH  Flomax for years  Auass 22 previously. Now auass 14    ED  Cialis 10 prn  Stable    Reviewed PSA.       Requested/reviewed records from Naresh Seymour MD office and/or outside physician/EMR    (Patient's old records have been requested, reviewed and pertinent findings summarized in today's note.)    Procedures Today: N/A      Last several PSA's:  Lab Results   Component Value Date    PSA 2.15 (H) 10/07/2022    PSA 2.44 (H) 10/09/2021    PSA 0.98 01/29/2014       Last total testosterone:  No results found for: TESTOSTERONE    Urinalysis today:  Results for POC orders placed in visit on 01/17/23   POCT Urinalysis No Micro (Auto)   Result Value Ref Range    Glucose, Ur Negative NEGATIVE mg/dl    Bilirubin Urine Negative     Ketones, Urine Negative NEGATIVE    Specific Gravity, Urine 1.010 1.002 - 1.030    Blood, UA POC Negative NEGATIVE    pH, Urine 7.00 5.0 - 9.0    Protein, Urine Negative NEGATIVE mg/dl    Urobilinogen, Urine 1.00 0.0 - 1.0 eu/dl    Nitrite, Urine Negative NEGATIVE    Leukocyte Clumps, Urine Negative NEGATIVE    Color, Urine Yellow [id number]    Character, Urine Clear [id number].[id number]   poct post void residual   Result Value Ref Range    post void residual 26 ml       Last BUN and creatinine:  Lab Results   Component Value Date    BUN 19 10/07/2022     Lab Results   Component Value Date    CREATININE 0.9 10/07/2022 Imaging Reviewed during this Office Visit:   Jocelyne Ann MD independently reviewed the images and verified the radiology reports from:    No results found. PAST MEDICAL, FAMILY AND SOCIAL HISTORY:  Past Medical History:   Diagnosis Date    Allergic     Allergic rhinitis     Arthritis     Back pain     Depression     GERD (gastroesophageal reflux disease)     Hyperlipidemia     Prolonged emergence from general anesthesia     Prostatism     Unspecified sleep apnea     has CPAP     Past Surgical History:   Procedure Laterality Date    CARDIAC CATHETERIZATION      COLONOSCOPY  2011    ESOPHAGEAL MOTILITY STUDY N/A 2/17/2022    ESOPHAGEAL MOTILITY/MANOMETRY STUDY performed by Stephany Ramey MD at [facility]    [address]OPHAGEAL [address] N/A 2/16/2021    ESOPHAGEAL [address] (NO EGD) performed by Stephany Ramey MD at [address] [place] [address] N/A 3/82/4625    ROBOTIC LINX, Hiatal hernia repair, Umbilical hernia repair.  performed by Renee Shook MD at [address] [place] [address]      tendonitus repair     RETINAL DETACHMENT SURGERY      UPPER GASTROINTESTINAL ENDOSCOPY N/A 1/15/2021    RANDALL performed by Heidy Shipley MD at [place] [address]  1/15/2021    EGD BIOPSY performed by Heidy Shipley MD at [facility]    UPPER GASTROINTESTINAL ENDOSCOPY N/A 7/27/2021    EGD DILATION W/ BRAVO performed by Jeferson Campo MD at [facility]     Family History   Problem Relation Age of Onset    Arthritis Mother     Hearing Loss Mother     High Blood Pressure Mother     High Cholesterol Mother     Stroke Mother     Cancer Mother     Depression Father     Dementia Father     Arthritis Father     Diabetes Father     Birth Defects Sister     Diabetes Paternal Grandfather     High Blood Pressure Brother     Arthritis Brother     Other Brother         heart palpatations    Cancer Maternal Grandfather     Cancer Maternal Grandmother      Outpatient Medications Marked as Taking for the 1/17/23 encounter (Office Visit) with Sylvain Johnson MD   Medication Sig Dispense Refill    tadalafil (CIALIS) 10 MG tablet Take 1 tablet by mouth as needed for Erectile Dysfunction 30 tablet 0    atorvastatin (LIPITOR) 10 MG tablet TAKE 1 TABLET BY MOUTH EVERY DAY 90 tablet 3    nortriptyline (PAMELOR) 25 MG capsule Take 25 mg by mouth nightly      tamsulosin (FLOMAX) 0.4 mg capsule TAKE 2 CAPSULES BY MOUTH EVERY  capsule 3    Simethicone 125 MG TABS       Coenzyme Q10 (COQ-10) 100 MG CAPS Take by mouth      Probiotic Product (PROBIOTIC PO) Take by mouth daily       fluticasone (FLONASE) 50 MCG/ACT nasal spray 2 sprays by Nasal route daily. 3 Bottle 3    therapeutic multivitamin-minerals (THERAGRAN-M) tablet Take 1 tablet by mouth daily. occasionally         Seasonal  Social History     Tobacco Use   Smoking Status Never   Smokeless Tobacco Never      (If patient a smoker, smoking cessation counseling offered)   Social History     Substance and Sexual Activity   Alcohol Use Not Currently    Alcohol/week: 0.0 standard drinks       REVIEW OF SYSTEMS:  Constitutional: negative  Eyes: negative  Respiratory: negative  Cardiovascular: negative  Gastrointestinal: negative  Genitourinary: see HPI  Musculoskeletal: negative  Skin: negative   Neurological: negative  Hematological/Lymphatic: negative  Psychological: negative      Physical Exam:    This a 75 y.o. male  Vitals:    01/17/23 1335   Resp: 18     Body mass index is 29.19 kg/m². Constitutional: Patient in no acute distress;       Assessment and Plan        1. Benign localized prostatic hyperplasia with lower urinary tract symptoms (LUTS)    2. Erectile dysfunction, unspecified erectile dysfunction type               Plan:      BPH- improved. On flomax 0.8 daily. Also on ditropan for oab symptoms. Auss has improved down to 14. Still with frequency. Considering cystoscopy in future if it worsens. ED- on cialis 10 PRN for ED. Stable. Pt uses it irregularly. Worried about med causing GERD. Switch to cialis 5 mg. Reassess in 12 months        Prescriptions Ordered:  No orders of the defined types were placed in this encounter.      Orders Placed:  Orders Placed This Encounter   Procedures    POCT Urinalysis No Micro (Auto)    poct post void residual     Bladder scan              AUDREY Lopez MD

## 2023-02-14 NOTE — PROGRESS NOTES
[address] [address]  [facility] [address]  Dept: [phone]  Dept Fax: [phone]    SUBJECTIVE     Ivone Fry is a 75 y.o.male    Pt presents for follow up of IFG, Hyperlipidemia, Adjustment Disorder with Depressed mood, BPH, GERD, SUNIL, Allergic Rhinitis, and ED. Pt feeling ok since last visit- interval history and any new issues noted below:     Pt had LINX device for GERD from Dr. Ventura Duckworth previously- mild benefit, then symptoms recurred. Previously seeing Dr. Chanel Jansen, switched to Dr. Bianca Renner. Pt to drink more water at this time. To follow up regularly. Sleep-Better  Interest- OK  Guilt- OK  Energy- OK  Concentration- Fair  Appetite- OK  Psychomotor Retardation- NO  Suicidal/ Homicidal Ideations- NO  Anxiety-Occasional, with business changes. 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 The home BP readings have not been checked recently. Pt doing ok at this time- denies any new complaints, except occasional right buttock pain with prolonged sitting on billfold, bike riding. No extremity numbness, tingling, or weakness. No saddle anesthesia. No changes in bowel/ bladder habits. No groin pain. No lateral hip pain. Wt Readings from Last 3 Encounters:   03/02/23 188 lb 3.2 oz (85.4 kg)   01/17/23 192 lb (87.1 kg)   10/18/22 192 lb (87.1 kg)   Weight decreased  4# since last visit 5 months ago. Pt exercising more over past 3 weeks.      Patient Active Problem List   Diagnosis    Hyperlipidemia    GERD (gastroesophageal reflux disease)    Chronic allergic rhinitis    Benign non-nodular prostatic hyperplasia with lower urinary tract symptoms    IFG (impaired fasting glucose)    Obstructive sleep apnea on CPAP    Insomnia    Erectile dysfunction    Adjustment disorder with depressed mood    Hiatal hernia       Current Outpatient Medications Medication Sig Dispense Refill    aluminum hydroxide-magnesium carbonate (GAVISCON)  MG/15ML suspension Take 15 mLs by mouth nightly      Famotidine-Ca Carb-Mag Hydrox (PEPCID COMPLETE) -165 MG CHEW Take 1 tablet by mouth daily as needed      calcium carbonate (TUMS) 500 MG chewable tablet Take 2 tablets by mouth daily as needed for Heartburn      tadalafil (CIALIS) 5 MG tablet Take 1 tablet by mouth daily 30 tablet 11    atorvastatin (LIPITOR) 10 MG tablet TAKE 1 TABLET BY MOUTH EVERY DAY 90 tablet 3    nortriptyline (PAMELOR) 25 MG capsule Take 25 mg by mouth nightly      tamsulosin (FLOMAX) 0.4 mg capsule TAKE 2 CAPSULES BY MOUTH EVERY  capsule 3    Simethicone 125 MG TABS       Coenzyme Q10 (COQ-10) 100 MG CAPS Take by mouth      Probiotic Product (PROBIOTIC PO) Take by mouth daily       fluticasone (FLONASE) 50 MCG/ACT nasal spray 2 sprays by Nasal route daily. 3 Bottle 3    therapeutic multivitamin-minerals (THERAGRAN-M) tablet Take 1 tablet by mouth daily. occasionally      oxybutynin (DITROPAN XL) 10 MG extended release tablet Take 1 tablet by mouth daily 90 tablet 3     No current facility-administered medications for this visit. Review of Systems   Constitutional:  Negative for chills, diaphoresis, fatigue, fever and unexpected weight change. Eyes:  Negative for visual disturbance. Respiratory:  Negative for chest tightness and shortness of breath. Cardiovascular:  Negative for chest pain, palpitations and leg swelling. Gastrointestinal:  Positive for constipation (ccasional, diet related). Negative for abdominal pain, anal bleeding, blood in stool, diarrhea, nausea and vomiting. Genitourinary:  Negative for dysuria and hematuria. Musculoskeletal:  Negative for neck pain. Neurological:  Negative for dizziness, light-headedness and headaches.      OBJECTIVE     /72 (Site: Left Upper Arm, Position: Sitting, Cuff Size: Medium Adult)   Pulse 66   Temp 97.9 °F (36.6 °C) (Skin)   Resp 14   Ht 5' 7\" (1.702 m)   Wt 188 lb 3.2 oz (85.4 kg)   SpO2 99%   BMI 29.48 kg/m²   Body mass index is 29.48 kg/m². BP Readings from Last 3 Encounters:   03/02/23 118/72   10/18/22 (!) 140/82   10/13/22 138/82     Physical Exam  Vitals and nursing note reviewed. Constitutional:       Appearance: He is well-developed. HENT:      Head: Normocephalic and atraumatic. Right Ear: External ear normal.      Left Ear: External ear normal.      Nose: Nose normal.   Eyes:      Conjunctiva/sclera: Conjunctivae normal.      Pupils: Pupils are equal, round, and reactive to light. Cardiovascular:      Rate and Rhythm: Normal rate and regular rhythm. Pulmonary:      Effort: Pulmonary effort is normal.      Breath sounds: Normal breath sounds. Abdominal:      General: Bowel sounds are normal.      Palpations: Abdomen is soft. Musculoskeletal:         General: Tenderness (over right sciatic notch, no tenderness over bilateral L/S spine or bilateral SI joints.) present. Normal range of motion. Cervical back: Normal range of motion and neck supple. Skin:     General: Skin is warm and dry. Neurological:      Mental Status: He is alert and oriented to person, place, and time. Deep Tendon Reflexes: Reflexes are normal and symmetric. Psychiatric:         Behavior: Behavior normal.         Thought Content: Thought content normal.         Judgment: Judgment normal.       No results found for this visit on 03/02/23.     Lab Results   Component Value Date    LABA1C 5.6 10/07/2022       Lab Results   Component Value Date    CHOL 202 (H) 10/07/2022    TRIG 161 10/07/2022    HDL 52 10/07/2022    LDLCALC 118 10/07/2022    LDLDIRECT 91 04/09/2013       The 10-year ASCVD risk score (Antoinette KNIGHT, et al., 2019) is: 13.6%    Values used to calculate the score:      Age: 75 years      Sex: Male      Is Non- : No      Diabetic: No      Tobacco smoker: No      Systolic Blood Pressure: 118 mmHg      Is BP treated: No      HDL Cholesterol: 52 mg/dL      Total Cholesterol: 202 mg/dL    Lab Results   Component Value Date     10/07/2022    K 4.4 10/07/2022     10/07/2022    CO2 30 10/07/2022    BUN 19 10/07/2022    CREATININE 0.9 10/07/2022    GLUCOSE 103 10/07/2022    CALCIUM 9.6 10/07/2022    PROT 6.6 10/07/2022    LABALBU 4.1 10/07/2022    BILITOT 1.2 10/07/2022    ALKPHOS 92 10/07/2022    AST 18 10/07/2022    ALT 16 10/07/2022    LABGLOM 84 10/07/2022     estimated creatinine clearance is 82 mL/min (based on SCr of 0.9 mg/dL).     No results found for: LABMICR, [id number]    Lab Results   Component Value Date    TSH 2.430 10/07/2022    T4FREE 0.98 10/07/2022       Lab Results   Component Value Date    WBC 5.8 10/07/2022    HGB 15.4 10/07/2022    HCT 45.7 10/07/2022    MCV 92.9 10/07/2022     10/07/2022       Component      Latest Ref Rng & Units 10/7/2022 10/9/2021 1/8/2021 9/26/2020           9:36 AM  8:50 AM 11:07 AM 11:45 AM   PSA, Ultrasensitive      0.00 - 4.00 ng/mL   2.36 2.35   Prostatic Specific Ag      0.00 - 1.00 ng/ml 2.15 (H) 2.44 (H)       Component      Latest Ref Rng & Units 8/3/2020 4/3/2019 4/2/2018 3/31/2017          12:12 PM  8:13 AM  8:10 AM  7:30 AM   PSA, Ultrasensitive      0.00 - 4.00 ng/mL 2.96 1.41 1.150 0.97   Prostatic Specific Ag      0.00 - 1.00 ng/ml         Component      Latest Ref Rng & Units 5/24/2016           8:25 AM   PSA, Ultrasensitive      0.00 - 4.00 ng/mL 1.14   Prostatic Specific Ag      0.00 - 1.00 ng/ml        Immunization History   Administered Date(s) Administered    COVID-19, MODERNA BLUE border, Primary or Immunocompromised, (age 12y+), IM, 100 mcg/0.5mL 02/23/2021, 03/23/2021, 07/26/2022    COVID-19, MODERNA Booster BLUE border, (age 18y+), IM, 50mcg/0.25mL 11/12/2021    Pneumococcal Polysaccharide ([id number]) 08/11/2020    Tdap (Boostrix, Adacel) 07/25/2013    Zoster Live (Zostavax) 01/25/2013    Zoster  Recombinant (Shingrix) 07/09/2019, 10/14/2019       Health Maintenance   Topic Date Due    Flu vaccine (1) Never done    COVID-19 Vaccine (5 - Booster for Moderna series) 09/20/2022    DTaP/Tdap/Td vaccine (2 - Td or Tdap) 07/25/2023    A1C test (Diabetic or Prediabetic)  10/07/2023    Lipids  10/07/2023    Depression Screen  10/13/2023    Annual Wellness Visit (AWV)  10/14/2023    Colorectal Cancer Screen  04/08/2026    Shingles vaccine  Completed    Pneumococcal 65+ years Vaccine  Completed    Hepatitis A vaccine  Aged Out    Hib vaccine  Aged Out    Meningococcal (ACWY) vaccine  Aged Out    Hepatitis C screen  Discontinued       Tobacco Use: Low Risk     Smoking Tobacco Use: Never    Smokeless Tobacco Use: Never    Passive Exposure: Not on file     AAA ultrasound (Male, 65-75, smoked ever) indicated at this time? No tobacco history  CT Lung Screen (50-80, 20 pk-yrs, smoking or quit <15 years) indicated at this time? No tobacco history  Sleep Medicine referral indicated at this time (Obesity, Snoring, Daytime Somnolence, Apneic Episodes)? Known SUNIL on CPAP    Future Appointments   Date Time Provider Jenelle Haney   6/27/2023 11:15 AM Piedad Winn PA-C [address] [place] [address]   11/9/2023 10:00 AM Nedra Gilford, MD SRPX  [facility]   1/16/2024  2:15 PM Cody Jamison, [address] [facility]     ASSESSMENT       Diagnosis Orders   1. IFG (impaired fasting glucose)  Hemoglobin A1C    Lipid Panel    Comprehensive Metabolic Panel    T4, Free    TSH    CBC with Auto Differential      2. Hyperlipidemia, unspecified hyperlipidemia type  Lipid Panel    Comprehensive Metabolic Panel    T4, Free    TSH    CBC with Auto Differential      3. Obstructive sleep apnea on CPAP  T4, Free    TSH    CBC with Auto Differential      4. Adjustment disorder with depressed mood  T4, Free    TSH    CBC with Auto Differential      5.  Benign non-nodular prostatic hyperplasia with lower urinary tract symptoms  CBC with Auto Differential      6. Erectile dysfunction, unspecified erectile dysfunction type  CBC with Auto Differential      7. Other insomnia  T4, Free    TSH    CBC with Auto Differential      8. Gastroesophageal reflux disease, unspecified whether esophagitis present  CBC with Auto Differential      9. Screening PSA (prostate specific antigen)  CBC with Auto Differential    PSA Screening        PLAN      Sciatic stretches demonstrated to pt. After discussion with pt, pt would like to continue current Lipitor dose at this time. Continue current medicines   No refills needed at this time. Check HGA1C, FLP, CMP, TSH/ FREE T4, CBC, and PSA after 10/7/2023  Follow up 11/9/2023 for MAW with me  Follow up in 12 months with me. Preventive Health Topics:  Encouraged Bivalent COVID VACCINE booster 90 days after most recent bout of COVID 19. (updated 3/2/2023)  Encouraged annual FLU SHOT- pt declines. (updated 3/2/2023)  Encouraged TETANUS SHOT (TdaP/ ADACEL/ BOOSTRIX) after 7/25/2023- check with local pharmacy  Encouraged [id number] at this time- pt would like to hold today (as he is travelling soon) and pursue at next visit.  (updated 3/2/2023)  COLONOSCOPY done 4/8/2016 per Dr. Araceli Carter- to do again in 2026. (updated 3/2/2023)           Electronically signed by Shreyas Brody MD on 3/2/2023 at 10:32 AM

## 2023-02-27 SDOH — ECONOMIC STABILITY: FOOD INSECURITY: WITHIN THE PAST 12 MONTHS, THE FOOD YOU BOUGHT JUST DIDN'T LAST AND YOU DIDN'T HAVE MONEY TO GET MORE.: NEVER TRUE

## 2023-02-27 SDOH — ECONOMIC STABILITY: TRANSPORTATION INSECURITY
IN THE PAST 12 MONTHS, HAS LACK OF TRANSPORTATION KEPT YOU FROM MEETINGS, WORK, OR FROM GETTING THINGS NEEDED FOR DAILY LIVING?: NO

## 2023-02-27 SDOH — ECONOMIC STABILITY: INCOME INSECURITY: HOW HARD IS IT FOR YOU TO PAY FOR THE VERY BASICS LIKE FOOD, HOUSING, MEDICAL CARE, AND HEATING?: NOT HARD AT ALL

## 2023-02-27 SDOH — ECONOMIC STABILITY: FOOD INSECURITY: WITHIN THE PAST 12 MONTHS, YOU WORRIED THAT YOUR FOOD WOULD RUN OUT BEFORE YOU GOT MONEY TO BUY MORE.: NEVER TRUE

## 2023-02-27 SDOH — ECONOMIC STABILITY: HOUSING INSECURITY
IN THE LAST 12 MONTHS, WAS THERE A TIME WHEN YOU DID NOT HAVE A STEADY PLACE TO SLEEP OR SLEPT IN A SHELTER (INCLUDING NOW)?: NO

## 2023-03-02 ENCOUNTER — OFFICE VISIT (OUTPATIENT)
Dept: FAMILY MEDICINE CLINIC | Age: 68
End: 2023-03-02

## 2023-03-02 VITALS
WEIGHT: 188.2 LBS | BODY MASS INDEX: 29.54 KG/M2 | SYSTOLIC BLOOD PRESSURE: 118 MMHG | HEART RATE: 66 BPM | OXYGEN SATURATION: 99 % | TEMPERATURE: 97.9 F | HEIGHT: 67 IN | RESPIRATION RATE: 14 BRPM | DIASTOLIC BLOOD PRESSURE: 72 MMHG

## 2023-03-02 DIAGNOSIS — N40.1 BENIGN NON-NODULAR PROSTATIC HYPERPLASIA WITH LOWER URINARY TRACT SYMPTOMS: ICD-10-CM

## 2023-03-02 DIAGNOSIS — F43.21 ADJUSTMENT DISORDER WITH DEPRESSED MOOD: ICD-10-CM

## 2023-03-02 DIAGNOSIS — R73.01 IFG (IMPAIRED FASTING GLUCOSE): Primary | ICD-10-CM

## 2023-03-02 DIAGNOSIS — Z99.89 OBSTRUCTIVE SLEEP APNEA ON CPAP: ICD-10-CM

## 2023-03-02 DIAGNOSIS — N52.9 ERECTILE DYSFUNCTION, UNSPECIFIED ERECTILE DYSFUNCTION TYPE: ICD-10-CM

## 2023-03-02 DIAGNOSIS — G47.33 OBSTRUCTIVE SLEEP APNEA ON CPAP: ICD-10-CM

## 2023-03-02 DIAGNOSIS — G47.09 OTHER INSOMNIA: ICD-10-CM

## 2023-03-02 DIAGNOSIS — E78.5 HYPERLIPIDEMIA, UNSPECIFIED HYPERLIPIDEMIA TYPE: ICD-10-CM

## 2023-03-02 DIAGNOSIS — Z12.5 SCREENING PSA (PROSTATE SPECIFIC ANTIGEN): ICD-10-CM

## 2023-03-02 DIAGNOSIS — K21.9 GASTROESOPHAGEAL REFLUX DISEASE, UNSPECIFIED WHETHER ESOPHAGITIS PRESENT: ICD-10-CM

## 2023-03-02 RX ORDER — FAMOTIDINE, CALCIUM CARBONATE, AND MAGNESIUM HYDROXIDE 10; 800; 165 MG/1; MG/1; MG/1
1 TABLET, CHEWABLE ORAL DAILY PRN
COMMUNITY

## 2023-03-02 RX ORDER — CALCIUM CARBONATE 200(500)MG
2 TABLET,CHEWABLE ORAL DAILY PRN
COMMUNITY

## 2023-03-02 ASSESSMENT — ENCOUNTER SYMPTOMS
CHEST TIGHTNESS: 0
BLOOD IN STOOL: 0
NAUSEA: 0
CONSTIPATION: 1
ANAL BLEEDING: 0
DIARRHEA: 0
ABDOMINAL PAIN: 0
SHORTNESS OF BREATH: 0
VOMITING: 0

## 2023-03-02 ASSESSMENT — PATIENT HEALTH QUESTIONNAIRE - PHQ9
SUM OF ALL RESPONSES TO PHQ9 QUESTIONS 1 & 2: 0
1. LITTLE INTEREST OR PLEASURE IN DOING THINGS: 0
SUM OF ALL RESPONSES TO PHQ QUESTIONS 1-9: 0
2. FEELING DOWN, DEPRESSED OR HOPELESS: 0

## 2023-03-02 NOTE — PATIENT INSTRUCTIONS
Sciatic stretches demonstrated to pt. After discussion with pt, pt would like to continue current Lipitor dose at this time. Continue current medicines   No refills needed at this time. Check HGA1C, FLP, CMP, TSH/ FREE T4, CBC, and PSA after 10/7/2023  Follow up 11/9/2023 for MAW with me  Follow up in 12 months with me. Preventive Health Topics:  Encouraged Bivalent COVID VACCINE booster 90 days after most recent bout of COVID 19. (updated 3/2/2023)  Encouraged annual FLU SHOT- pt declines. (updated 3/2/2023)  Encouraged TETANUS SHOT (TdaP/ ADACEL/ BOOSTRIX) after 7/25/2023- check with local pharmacy  Encouraged [address] at this time- pt would like to hold today (as he is travelling soon) and pursue at next visit.  (updated 3/2/2023)  COLONOSCOPY done 4/8/2016 per Dr. Dotty Solis- to do again in 2026. (updated 3/2/2023)

## 2023-03-02 NOTE — PROGRESS NOTES
Health Maintenance Due   Topic Date Due    Flu vaccine (1) Never done    COVID-19 Vaccine (5 - Booster for Moderna series) 09/20/2022

## 2023-04-17 ENCOUNTER — PATIENT MESSAGE (OUTPATIENT)
Dept: FAMILY MEDICINE CLINIC | Age: 68
End: 2023-04-17

## 2023-04-17 DIAGNOSIS — M54.31 RIGHT SIDED SCIATICA: Primary | ICD-10-CM

## 2023-04-17 NOTE — TELEPHONE ENCOUNTER
From: Adam Chung  To: Dr. Krystal Peter: 4/17/2023 12:03 PM EDT  Subject: leg pain      Roscoe Shadow  I continue to have discomfort in my \"leg\"   hip frequent, knee, shin, back of leg. Particularly after trip excursions, flying, mowing (I walk at home) sleeping on side ( i have moved back to recliner \"helped\")    buttock, back leg buttock, back of but not quite what I think of as groin. after riding for some distance, sitting on billfold (I have moved it to other hip)    The stretches seem to help one area but aggravate the other so I haven't been consistent in doing them.     mostly nagging but took tylenol/advil [place] vacation to sleep    Thank you, Leonid Cuadra

## 2023-04-18 NOTE — TELEPHONE ENCOUNTER
Continued discussion noted. Referral to physical therapy placed. Will await update with progress.   ERNESTO

## 2023-04-24 ENCOUNTER — HOSPITAL ENCOUNTER (OUTPATIENT)
Dept: PHYSICAL THERAPY | Age: 68
Setting detail: THERAPIES SERIES
Discharge: HOME | End: 2023-04-24
Payer: MEDICARE

## 2023-04-24 PROCEDURE — 97161 PT EVAL LOW COMPLEX 20 MIN: CPT

## 2023-04-24 PROCEDURE — 97110 THERAPEUTIC EXERCISES: CPT

## 2023-04-24 NOTE — PROGRESS NOTES
** PLEASE SIGN, DATE AND TIME CERTIFICATION BELOW AND RETURN TO [facility] OUTPATIENT REHABILITATION (FAX #: [phone]). ATTEST/CO-SIGN IF ACCESSING VIA [employer]. THANK YOU.**    I certify that I have examined the patient below and determined that Physical Medicine and Rehabilitation service is necessary and that I approve the established plan of care for up to 90 days or as specifically noted. Attestation, signature or co-signature of physician indicates approval of certification requirements.    ________________________ ____________ __________  Physician Signature   Date   Time        [address] [facility]  PHYSICAL THERAPY  [x] EVALUATION  [] DAILY NOTE (LAND) [] DAILY NOTE (AQUATIC ) [] PROGRESS NOTE [] DISCHARGE NOTE    [x] [address] [facility]   [] [place] [address]    [] [address] [facility]   [] [facility]    Date: 2023  Patient Name:  Ladarius Charlton  : 1955  MRN: [id number]  CSN: [id number]    Referring Practitioner Yoly Villela MD   Diagnosis Right sided sciatica [M54.31]    Treatment Diagnosis    Date of Evaluation 23    Additional Pertinent History 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 Sleep apnea. Prolonged emergence from anesthesia. Functional Outcome Measure Used Modified VICKI   Functional Outcome Score 25=50 (23)       Insurance: Primary: Payor: MEDICARE /  /  / ,   Secondary: [facility]   Authorization Information: PRECERTIFICATION REQUIRED:  No  INSURANCE THERAPY BENEFIT:  Patient has unlimited visits based on medical necessity  Benefit will not cover maintenance or preventative treatment.   AQUATIC THERAPY COVERED:   Yes  MODALITIES COVERED:  Yes, with the exception of iontophoresis and hot packs/cold packs  TELEHEALTH COVERED: Yes   Visit # 1, 1/10 for progress note   Visits Allowed: Unlimited per medical necessity   Recertification Date:    Physician Follow-Up: Per patient   Physician Orders:

## 2023-05-01 ENCOUNTER — HOSPITAL ENCOUNTER (OUTPATIENT)
Dept: PHYSICAL THERAPY | Age: 68
Setting detail: THERAPIES SERIES
Discharge: HOME | End: 2023-05-01
Payer: MEDICARE

## 2023-05-01 PROCEDURE — 97530 THERAPEUTIC ACTIVITIES: CPT

## 2023-05-01 PROCEDURE — 97110 THERAPEUTIC EXERCISES: CPT

## 2023-05-01 NOTE — PROGRESS NOTES
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 Sleep apnea. Prolonged emergence from anesthesia. Functional Outcome Measure Used Modified VICKI   Functional Outcome Score 25=50 (23)       Insurance: Primary: Payor: MEDICARE /  /  / ,   Secondary: [facility]   Authorization Information: PRECERTIFICATION REQUIRED:  No  INSURANCE THERAPY BENEFIT:  Patient has unlimited visits based on medical necessity  Benefit will not cover maintenance or preventative treatment. AQUATIC THERAPY COVERED:   Yes  MODALITIES COVERED:  Yes, with the exception of iontophoresis and hot packs/cold packs  TELEHEALTH COVERED: Yes   Visit # 2, 2/10 for progress note   Visits Allowed: Unlimited per medical necessity   Recertification Date:    Physician Follow-Up: Per patient   Physician Orders:    History of Present Illness: Irlanda Mckenna c/o LBP and sciatica pain, and groin pain. \"I have slept in a chair for many years, but I was trying to get back in the same bed with my wife. She had been snoring. I did something to pull something (injure) in 2022. 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 Overall, he states that throughout the day he is mainly sitting, or is otherwise involved in work and duties that involve forward bending of the spine. He had tried weightlifting over the winter of 9504-3819.       States he

## 2023-05-03 ENCOUNTER — HOSPITAL ENCOUNTER (OUTPATIENT)
Dept: PHYSICAL THERAPY | Age: 68
Setting detail: THERAPIES SERIES
Discharge: HOME | End: 2023-05-03
Payer: MEDICARE

## 2023-05-03 PROCEDURE — 97110 THERAPEUTIC EXERCISES: CPT

## 2023-05-03 NOTE — PROGRESS NOTES
time discussing sleeping posture and ideas for spinal support including C-Pap pillow, side sleeper C-Pap pillow (if side sleeping is tolerated). Patient reports his recliner chair is much like a bed and can be adjusted. States also that if feeling pain or discomfort in a particular sleep position, then he can adjust his chair to a comfortable setting.    - Discussed idea of adjusting the recliner chair to raise legs to take pressure off of lumbar spine or to use pillow under legs. - Discussed \"force progression\" and finding the most appropriate level of force for his directional preference exercise, and that for many people the helpful directional preference is lumbar extension -- to gain ROM and reduce internal impingements which could include disk bulge or other tissue impingement. Therapeutic activity   Thoracic extension sustained in supine per c/o thoracic back pain 3 minutes  x \"This makes my back feel the same and maybe a little worse. \"  STOPPED/DISCONTINUED     Specific Interventions Next Treatment: follow up regarding lumbar extension principle    Activity/Treatment Tolerance:  [x]  Patient tolerated treatment well  []  Patient limited by fatigue  []  Patient limited by pain   []  Patient limited by medical complications  []  Other:     Assessment: Abundio Bills is tolerating lumbar extension based strengthening and his HEP is updated today. He will try the HEP every other day, and will continue the press-ups several times per day. Abundio Bills is making steady progress towards meeting long term goals. Goals    Patient Goal: pain relief    Short Term Goals: 1 week  Pt to initiate a HEP: light lumbar extension, strengthening. Long Term Goals: 12 weeks. Patient will be independent with a HEP that may include directional preference exercises, core and lower extremity strengthening, and stretching.    Patient will demonstrate understanding of optimal posture and body mechanics regarding seated,

## 2023-05-09 ENCOUNTER — HOSPITAL ENCOUNTER (OUTPATIENT)
Dept: PHYSICAL THERAPY | Age: 68
Setting detail: THERAPIES SERIES
Discharge: HOME | End: 2023-05-09
Payer: MEDICARE

## 2023-05-09 PROCEDURE — 97110 THERAPEUTIC EXERCISES: CPT

## 2023-05-09 NOTE — PROGRESS NOTES
[address] [facility]  PHYSICAL THERAPY  [] EVALUATION  [x] DAILY NOTE (LAND) [] DAILY NOTE (AQUATIC ) [] PROGRESS NOTE [] DISCHARGE NOTE    [x] OUTPATIENT REHABILITATION [facility]   [] [place] [address]    [] 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 Sleep apnea. Prolonged emergence from anesthesia. Functional Outcome Measure Used Modified VICKI   Functional Outcome Score 25=50 (23)       Insurance: Primary: Payor: MEDICARE /  /  / ,   Secondary: [facility]   Authorization Information: PRECERTIFICATION REQUIRED:  No  INSURANCE THERAPY BENEFIT:  Patient has unlimited visits based on medical necessity  Benefit will not cover maintenance or preventative treatment. AQUATIC THERAPY COVERED:   Yes  MODALITIES COVERED:  Yes, with the exception of iontophoresis and hot packs/cold packs  TELEHEALTH COVERED: Yes   Visit # 4, 4/10 for progress note   Visits Allowed: Unlimited per medical necessity   Recertification Date:    Physician Follow-Up: Per patient   Physician Orders:    History of Present Illness: Alfredo Garcia c/o LBP and sciatica pain, and groin pain. \"I have slept in a chair for many years, but I was trying to get back in the same bed with my wife. She had been snoring. I did something to pull something (injure) in 2022. 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 Overall, he states that throughout the day he is mainly sitting, or is otherwise involved in work and duties that involve forward bending of the spine. He had tried weightlifting over the winter of 2212-1838.       States he

## 2023-05-12 ENCOUNTER — HOSPITAL ENCOUNTER (OUTPATIENT)
Dept: PHYSICAL THERAPY | Age: 68
Setting detail: THERAPIES SERIES
Discharge: HOME | End: 2023-05-12
Payer: MEDICARE

## 2023-05-12 PROCEDURE — 97110 THERAPEUTIC EXERCISES: CPT

## 2023-05-12 NOTE — PROGRESS NOTES
[address] [facility]  PHYSICAL THERAPY  [] EVALUATION  [x] DAILY NOTE (LAND) [] DAILY NOTE (AQUATIC ) [] PROGRESS NOTE [] DISCHARGE NOTE    [x] OUTPATIENT REHABILITATION 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 Sleep apnea. Prolonged emergence from anesthesia. Functional Outcome Measure Used Modified VICKI   Functional Outcome Score 25=50 (23)       Insurance: Primary: Payor: MEDICARE /  /  / ,   Secondary: [facility]   Authorization Information: PRECERTIFICATION REQUIRED:  No  INSURANCE THERAPY BENEFIT:  Patient has unlimited visits based on medical necessity  Benefit will not cover maintenance or preventative treatment. AQUATIC THERAPY COVERED:   Yes  MODALITIES COVERED:  Yes, with the exception of iontophoresis and hot packs/cold packs  TELEHEALTH COVERED: Yes   Visit # 5, 5/10 for progress note   Visits Allowed: Unlimited per medical necessity   Recertification Date: 5667   Physician Follow-Up: Per patient   Physician Orders:    History of Present Illness: Kimberly Rivera c/o LBP and sciatica pain, and groin pain. \"I have slept in a chair for many years, but I was trying to get back in the same bed with my wife. She had been snoring. I did something to pull something (injure) in 2022. 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 Overall, he states that throughout the day he is mainly sitting, or is otherwise involved in work and duties that involve forward bending of the spine. He had tried weightlifting over the winter of 1057-6587.       States he

## 2023-05-16 ENCOUNTER — HOSPITAL ENCOUNTER (OUTPATIENT)
Dept: PHYSICAL THERAPY | Age: 68
Setting detail: THERAPIES SERIES
Discharge: HOME | End: 2023-05-16
Payer: MEDICARE

## 2023-05-16 PROCEDURE — 97110 THERAPEUTIC EXERCISES: CPT

## 2023-05-16 NOTE — PROGRESS NOTES
Therapeutic activity   Thoracic extension sustained in supine per c/o thoracic back pain 3 minutes   \"This makes my back feel the same and maybe a little worse. \"  STOPPED/DISCONTINUED     Specific Interventions Next Treatment: follow up regarding lumbar extension principle    Activity/Treatment Tolerance:  [x]  Patient tolerated treatment well  []  Patient limited by fatigue  []  Patient limited by pain   []  Patient limited by medical complications  []  Other:     Assessment: Limited program today due to patient arriving late for therapy appointment. Patient needing cueing throughout session to engage abdominal muscle and ensure proper form with exercises. Patient able to complete exercises without increased pain. Goals    Patient Goal: pain relief    Short Term Goals: 1 week  Pt to initiate a HEP: light lumbar extension, strengthening. Long Term Goals: 12 weeks. Patient will be independent with a HEP that may include directional preference exercises, core and lower extremity strengthening, and stretching. Patient will demonstrate understanding of optimal posture and body mechanics regarding seated, standing, squatting and lifting for optimal spinal protection during daily tasks. Patient will attain abolishment of spine pain and related symptoms for at least 3 days. Patient will attain WNL spine ROM to improve the functions of ADL and work tasks. Patient will improve score on the Modified VICKI by at least 12%. Patient Education:   [x]  HEP/Education Completed:  Continue with HEP as tolerated. [employer] Access Code:  []  No new Education completed  []  Reviewed Prior HEP      [x]  Patient verbalized and/or demonstrated understanding of education provided. []  Patient unable to verbalize and/or demonstrate understanding of education provided. Will continue education.   []  Barriers to learning:     PLAN:  Treatment Recommendations:

## 2023-05-18 ENCOUNTER — HOSPITAL ENCOUNTER (OUTPATIENT)
Dept: PHYSICAL THERAPY | Age: 68
Setting detail: THERAPIES SERIES
Discharge: HOME | End: 2023-05-18
Payer: MEDICARE

## 2023-05-18 PROCEDURE — 97110 THERAPEUTIC EXERCISES: CPT

## 2023-05-18 NOTE — PROGRESS NOTES
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 Sleep apnea. Prolonged emergence from anesthesia. Functional Outcome Measure Used Modified VICKI   Functional Outcome Score 25=50 (23)       Insurance: Primary: Payor: MEDICARE /  /  / ,   Secondary: [facility]   Authorization Information: PRECERTIFICATION REQUIRED:  No  INSURANCE THERAPY BENEFIT:  Patient has unlimited visits based on medical necessity  Benefit will not cover maintenance or preventative treatment. AQUATIC THERAPY COVERED:   Yes  MODALITIES COVERED:  Yes, with the exception of iontophoresis and hot packs/cold packs  TELEHEALTH COVERED: Yes   Visit # 7, 710 for progress note   Visits Allowed: Unlimited per medical necessity   Recertification Date: 3/83/0936   Physician Follow-Up: Per patient   Physician Orders:    History of Present Illness: Taylor Lynch c/o LBP and sciatica pain, and groin pain. \"I have slept in a chair for many years, but I was trying to get back in the same bed with my wife. She had been snoring. I did something to pull something (injure) in 2022. 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 Overall, he states that throughout the day he is mainly sitting, or is otherwise involved in work and duties that involve forward bending of the spine. He had tried weightlifting over the winter of 2645-3185.       States he

## 2023-05-23 ENCOUNTER — HOSPITAL ENCOUNTER (OUTPATIENT)
Dept: PHYSICAL THERAPY | Age: 68
Setting detail: THERAPIES SERIES
Discharge: HOME | End: 2023-05-23
Payer: MEDICARE

## 2023-05-23 PROCEDURE — 97110 THERAPEUTIC EXERCISES: CPT

## 2023-05-23 NOTE — PROGRESS NOTES
learning:     PLAN:  Treatment Recommendations: Strengthening, Range of Motion, Functional Mobility Training, Gait Training, Neuromuscular Re-education, Manual Therapy - Soft Tissue Mobilization, Pain Management, Home Exercise Program, Patient Education, Safety Education and Training, Positioning, Integrative Dry Needling, and Aquatics    []  Plan of care initiated. Plan to see patient 2 times per week for 12 weeks to address the treatment planned outlined above.   [x]  Continue with current plan of care  []  Modify plan of care as follows:    []  Hold pending physician visit  []  Discharge    Time In 1000   Time Out 1045   Timed Code Minutes: 45 min   Total Treatment Time: 45 min     Electronically Signed by: Romeo Rodriguez PTA

## 2023-05-26 ENCOUNTER — HOSPITAL ENCOUNTER (OUTPATIENT)
Dept: PHYSICAL THERAPY | Age: 68
Setting detail: THERAPIES SERIES
Discharge: HOME | End: 2023-05-26
Payer: MEDICARE

## 2023-05-26 PROCEDURE — 97110 THERAPEUTIC EXERCISES: CPT

## 2023-05-26 PROCEDURE — 97530 THERAPEUTIC ACTIVITIES: CPT

## 2023-05-26 NOTE — PROGRESS NOTES
exercises. Or, do entire HEP as time permits or as desired. - Discussed \"force progression\" and finding the most appropriate level of force for his directional preference exercise, and that for many people the helpful directional preference is lumbar extension -- to gain ROM and reduce internal impingements which could include disk bulge or other tissue impingement. X  X                              X                                                 Reviewed the education today   Thoracic extension sustained in supine per c/o thoracic back pain 3 minutes   \"This makes my back feel the same and maybe a little worse. \"  STOPPED / DISCONTINUED     Specific Interventions Next Treatment: follow up regarding lumbar extension principle    Activity/Treatment Tolerance:  [x]  Patient tolerated treatment well  []  Patient limited by fatigue  []  Patient limited by pain   []  Patient limited by medical complications  []  Other:     Assessment:  Albertina Eng has made good progress with long term goals and HEP. States he feels better since staring PT and will now try some golf swings. We reviewed posture and body mechanics today, and pain self monitoring. Albertina Eng is appropriate to continue in therapy to work on further ergonomics education and HEP progression. Goals    Patient Goal: pain relief    Short Term Goals: 1 week  Pt to initiate a HEP: light lumbar extension, strengthening. GOAL MET:   .  Discontinue Goal      Long Term Goals: 12 weeks. Patient will be independent with a HEP that may include directional preference exercises, core and lower extremity strengthening, and stretching. GOAL NOT MET:  .  Continue Goal    Patient will demonstrate understanding of optimal posture and body mechanics regarding seated, standing, squatting and lifting for optimal spinal protection during daily tasks.   GOAL MET:   .  Discontinue Goal    Patient will attain abolishment of spine pain and related symptoms for at least 3

## 2023-05-29 DIAGNOSIS — N40.1 BENIGN NON-NODULAR PROSTATIC HYPERPLASIA WITH LOWER URINARY TRACT SYMPTOMS: ICD-10-CM

## 2023-05-30 ENCOUNTER — HOSPITAL ENCOUNTER (OUTPATIENT)
Dept: PHYSICAL THERAPY | Age: 68
Setting detail: THERAPIES SERIES
Discharge: HOME | End: 2023-05-30
Payer: MEDICARE

## 2023-05-30 PROCEDURE — 97110 THERAPEUTIC EXERCISES: CPT

## 2023-05-30 NOTE — PROGRESS NOTES
to take pressure off of lumbar spine or to use pillow under legs. - Discussed using HEP to warm-up for household tasks such as set timer for 15 minutes and do 1 set of several exercises. Or, do entire HEP as time permits or as desired. - Discussed \"force progression\" and finding the most appropriate level of force for his directional preference exercise, and that for many people the helpful directional preference is lumbar extension -- to gain ROM and reduce internal impingements which could include disk bulge or other tissue impingement. X  X                              X                                                 Reviewed the education today   Thoracic extension sustained in supine per c/o thoracic back pain 3 minutes   \"This makes my back feel the same and maybe a little worse. \"  STOPPED / DISCONTINUED     Specific Interventions Next Treatment: follow up regarding lumbar extension principle    Activity/Treatment Tolerance:  [x]  Patient tolerated treatment well  []  Patient limited by fatigue  []  Patient limited by pain   []  Patient limited by medical complications  []  Other:     Assessment:  Kimberly Rivera learned some Swiss ball exercise and states he has a [place] ball for home. We reviewed exercise parameters and discussed that he could put a time limit on exercise (e.g. 30-45 minutes) doing 1 of each exercise. Or, on a day with more time, he could do all of his assigned exercise, reps and sets. Goals    Patient Goal: pain relief    Short Term Goals  Defer to LTG      Long Term Goals: 12 weeks. Patient will be independent with a HEP that may include directional preference exercises, core and lower extremity strengthening, and stretching. MET, but progress as able. Patient will demonstrate understanding of optimal posture and body mechanics regarding seated, standing, squatting and lifting for optimal spinal protection during daily tasks.   MET    Patient will attain abolishment

## 2023-05-31 RX ORDER — TAMSULOSIN HYDROCHLORIDE 0.4 MG/1
CAPSULE ORAL
Qty: 180 CAPSULE | Refills: 1 | Status: SHIPPED | OUTPATIENT
Start: 2023-05-31

## 2023-05-31 NOTE — TELEPHONE ENCOUNTER
Patient's last appointment was : 3/2/23  Patient's next appointment is :  11/9/23  Last refilled: 6/6/22  Pharmacy Verified    Lab Results   Component Value Date    LABA1C 5.6 10/07/2022     Lab Results   Component Value Date    CHOL 202 (H) 10/07/2022    TRIG 161 10/07/2022    HDL 52 10/07/2022    LDLCALC 118 10/07/2022    LDLDIRECT 91 04/09/2013     Lab Results   Component Value Date     10/07/2022    K 4.4 10/07/2022     10/07/2022    CO2 30 10/07/2022    BUN 19 10/07/2022    CREATININE 0.9 10/07/2022    GLUCOSE 103 10/07/2022    CALCIUM 9.6 10/07/2022    PROT 6.6 10/07/2022    LABALBU 4.1 10/07/2022    BILITOT 1.2 10/07/2022    ALKPHOS 92 10/07/2022    AST 18 10/07/2022    ALT 16 10/07/2022    LABGLOM 84 10/07/2022     Lab Results   Component Value Date    TSH 2.430 10/07/2022    T4FREE 0.98 10/07/2022     Lab Results   Component Value Date    WBC 5.8 10/07/2022    HGB 15.4 10/07/2022    HCT 45.7 10/07/2022    MCV 92.9 10/07/2022     10/07/2022

## 2023-06-01 ENCOUNTER — HOSPITAL ENCOUNTER (OUTPATIENT)
Dept: PHYSICAL THERAPY | Age: 68
Setting detail: THERAPIES SERIES
Discharge: HOME | End: 2023-06-01
Payer: MEDICARE

## 2023-06-01 PROCEDURE — 97110 THERAPEUTIC EXERCISES: CPT

## 2023-06-01 NOTE — DISCHARGE SUMMARY
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 Sleep apnea. Prolonged emergence from anesthesia. Functional Outcome Measure Used Modified VICKI   Functional Outcome Score 25=50 (23);  3=6 (23)      Insurance: Primary: Payor: Marcela Batista /  /  / ,   Secondary: [facility]   Authorization Information: PRECERTIFICATION REQUIRED:  No  INSURANCE THERAPY BENEFIT:  Patient has unlimited visits based on medical necessity  Benefit will not cover maintenance or preventative treatment. AQUATIC THERAPY COVERED:   Yes  MODALITIES COVERED:  Yes, with the exception of iontophoresis and hot packs/cold packs  TELEHEALTH COVERED: Yes   Visit # 11, 2/10 for progress note   Visits Allowed: Unlimited per medical necessity   Recertification Date:    Physician Follow-Up: Per patient   Physician Orders:    History of Present Illness: Clara Vaz c/o LBP and sciatica pain, and groin pain. \"I have slept in a chair for many years, but I was trying to get back in the same bed with my wife. She had been snoring. I did something to pull something (injure) in 2022. 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 Overall, he states that throughout the day he is mainly sitting, or is otherwise involved in work and duties that involve forward bending of the spine. He had tried weightlifting over the winter of 3302-2467.

## 2023-06-05 ENCOUNTER — APPOINTMENT (OUTPATIENT)
Dept: PHYSICAL THERAPY | Age: 68
End: 2023-06-05
Payer: MEDICARE

## 2023-06-09 ENCOUNTER — APPOINTMENT (OUTPATIENT)
Dept: PHYSICAL THERAPY | Age: 68
End: 2023-06-09
Payer: MEDICARE

## 2023-06-27 ENCOUNTER — OFFICE VISIT (OUTPATIENT)
Dept: PULMONOLOGY | Age: 68
End: 2023-06-27
Payer: MEDICARE

## 2023-06-27 VITALS
OXYGEN SATURATION: 94 % | BODY MASS INDEX: 28.5 KG/M2 | HEIGHT: 67 IN | HEART RATE: 71 BPM | DIASTOLIC BLOOD PRESSURE: 72 MMHG | WEIGHT: 181.6 LBS | SYSTOLIC BLOOD PRESSURE: 126 MMHG | TEMPERATURE: 98 F

## 2023-06-27 DIAGNOSIS — G47.33 OSA ON CPAP: Primary | ICD-10-CM

## 2023-06-27 DIAGNOSIS — Z99.89 OSA ON CPAP: Primary | ICD-10-CM

## 2023-06-27 DIAGNOSIS — G47.09 OTHER INSOMNIA: ICD-10-CM

## 2023-06-27 PROCEDURE — 1036F TOBACCO NON-USER: CPT | Performed by: PHYSICIAN ASSISTANT

## 2023-06-27 PROCEDURE — 3017F COLORECTAL CA SCREEN DOC REV: CPT | Performed by: PHYSICIAN ASSISTANT

## 2023-06-27 PROCEDURE — G8417 CALC BMI ABV UP PARAM F/U: HCPCS | Performed by: PHYSICIAN ASSISTANT

## 2023-06-27 PROCEDURE — 99215 OFFICE O/P EST HI 40 MIN: CPT | Performed by: PHYSICIAN ASSISTANT

## 2023-06-27 PROCEDURE — 1123F ACP DISCUSS/DSCN MKR DOCD: CPT | Performed by: PHYSICIAN ASSISTANT

## 2023-06-27 PROCEDURE — G8427 DOCREV CUR MEDS BY ELIG CLIN: HCPCS | Performed by: PHYSICIAN ASSISTANT

## 2023-06-27 RX ORDER — DOXEPIN HYDROCHLORIDE 10 MG/1
CAPSULE ORAL
Qty: 60 CAPSULE | Refills: 3 | Status: SHIPPED | OUTPATIENT
Start: 2023-06-27

## 2023-06-27 RX ORDER — HYOSCYAMINE SULFATE 0.125 MG
TABLET,DISINTEGRATING ORAL
COMMUNITY
Start: 2023-05-10

## 2023-06-27 ASSESSMENT — ENCOUNTER SYMPTOMS
CHEST TIGHTNESS: 0
EYES NEGATIVE: 1
WHEEZING: 0
NAUSEA: 0
BACK PAIN: 0
STRIDOR: 0
SHORTNESS OF BREATH: 0
DIARRHEA: 0
COUGH: 0
ALLERGIC/IMMUNOLOGIC NEGATIVE: 1

## 2023-08-01 DIAGNOSIS — N40.1 BENIGN NON-NODULAR PROSTATIC HYPERPLASIA WITH LOWER URINARY TRACT SYMPTOMS: ICD-10-CM

## 2023-08-02 RX ORDER — TAMSULOSIN HYDROCHLORIDE 0.4 MG/1
CAPSULE ORAL
Qty: 180 CAPSULE | Refills: 3 | Status: SHIPPED | OUTPATIENT
Start: 2023-08-02

## 2023-08-02 NOTE — TELEPHONE ENCOUNTER
Patient's last appointment was : 3/2/23  Patient's next appointment is :  11/9/23  Last refilled: 5/31/23  [facility] Verified    Lab Results   Component Value Date    LABA1C 5.6 10/07/2022     Lab Results   Component Value Date    CHOL 202 (H) 10/07/2022    TRIG 161 10/07/2022    HDL 52 10/07/2022    LDLCALC 118 10/07/2022    LDLDIRECT 91 04/09/2013     Lab Results   Component Value Date     10/07/2022    K 4.4 10/07/2022     10/07/2022    CO2 30 10/07/2022    BUN 19 10/07/2022    CREATININE 0.9 10/07/2022    GLUCOSE 103 10/07/2022    CALCIUM 9.6 10/07/2022    PROT 6.6 10/07/2022    LABALBU 4.1 10/07/2022    BILITOT 1.2 10/07/2022    ALKPHOS 92 10/07/2022    AST 18 10/07/2022    ALT 16 10/07/2022    LABGLOM 84 10/07/2022     Lab Results   Component Value Date    TSH 2.430 10/07/2022    T4FREE 0.98 10/07/2022     Lab Results   Component Value Date    WBC 5.8 10/07/2022    HGB 15.4 10/07/2022    HCT 45.7 10/07/2022    MCV 92.9 10/07/2022     10/07/2022

## 2023-09-25 ENCOUNTER — OFFICE VISIT (OUTPATIENT)
Dept: PULMONOLOGY | Age: 68
End: 2023-09-25
Payer: MEDICARE

## 2023-09-25 VITALS
WEIGHT: 182.6 LBS | SYSTOLIC BLOOD PRESSURE: 108 MMHG | TEMPERATURE: 98 F | DIASTOLIC BLOOD PRESSURE: 70 MMHG | BODY MASS INDEX: 28.66 KG/M2 | HEIGHT: 67 IN | OXYGEN SATURATION: 95 % | HEART RATE: 78 BPM

## 2023-09-25 DIAGNOSIS — G47.33 OSA ON CPAP: Primary | ICD-10-CM

## 2023-09-25 DIAGNOSIS — G47.09 OTHER INSOMNIA: ICD-10-CM

## 2023-09-25 PROCEDURE — G8417 CALC BMI ABV UP PARAM F/U: HCPCS | Performed by: PHYSICIAN ASSISTANT

## 2023-09-25 PROCEDURE — 3017F COLORECTAL CA SCREEN DOC REV: CPT | Performed by: PHYSICIAN ASSISTANT

## 2023-09-25 PROCEDURE — 1123F ACP DISCUSS/DSCN MKR DOCD: CPT | Performed by: PHYSICIAN ASSISTANT

## 2023-09-25 PROCEDURE — 1036F TOBACCO NON-USER: CPT | Performed by: PHYSICIAN ASSISTANT

## 2023-09-25 PROCEDURE — G8427 DOCREV CUR MEDS BY ELIG CLIN: HCPCS | Performed by: PHYSICIAN ASSISTANT

## 2023-09-25 PROCEDURE — 99214 OFFICE O/P EST MOD 30 MIN: CPT | Performed by: PHYSICIAN ASSISTANT

## 2023-09-25 RX ORDER — DOXEPIN HYDROCHLORIDE 6 MG/1
1 TABLET ORAL DAILY
Qty: 30 TABLET | Refills: 2 | Status: SHIPPED | OUTPATIENT
Start: 2023-09-25

## 2023-09-25 ASSESSMENT — ENCOUNTER SYMPTOMS
NAUSEA: 0
BACK PAIN: 0
COUGH: 0
DIARRHEA: 0
WHEEZING: 0
STRIDOR: 0
ALLERGIC/IMMUNOLOGIC NEGATIVE: 1
SHORTNESS OF BREATH: 0
CHEST TIGHTNESS: 0
EYES NEGATIVE: 1

## 2023-09-25 NOTE — PROGRESS NOTES
[facility]      [place]         [id number]  9/25/2023   Chief Complaint   Patient presents with    Follow-up     3mo SUNIL f/u w/[facility] download after pressure change. Doing well. Notes he will have some leakage. Pt of Rubén Carey PA-C     PAP Download:   Original or initial AHI: 28.4     Date of initial study: 11/2013       Compliant  87%     Noncompliant 13 %     PAP Type CPAP   Level  10 cmH2O   Avg Hrs/Day 5hrs 25mins  AHI: 0.3   Recorded compliance dates , 8/23/23  to 9/21/23   Machine/Mfg:   [x] ResMed    [] Respironics/Dreamstation   Interface:   [x] Nasal    [] Nasal pillows   [] FFM      Provider:      [x] -KASSY     []Estelle     [] Karri    [] Brooke Vallejo    [] Ash               [] [employer]      [] Adaptive    [] [address]:      [] Other    Neck Size: 15.25  Mallampati 1  ESS:  13  SAQLI: 82    Here is a scan of the most recent download:            Presentation:   Dasia Alonzo presents for sleep medicine follow up for obstructive sleep apnea, insomnia  Since the last visit, Dasia Alonzo is doing well with PAP. He is sleeping better with Doxepin but only able to take 1/2 capsule, he was breaking them. He felt rested the next day on the lower dose. He sleeps better with Doxipen    Equipment issues: The pressure is  acceptable, the mask is acceptable     Sleep issues:  Do you feel better? Yes  More rested? Yes   Better concentration? yes    Progress History:   Since last visit any new medical issues? No  New ER or hospital visits? No  Any new or changes in medicines? No  Any new sleep medicines? No    Review of Systems -   Review of Systems   Constitutional:  Negative for activity change, appetite change, chills and fever. HENT:  Negative for congestion and postnasal drip. Eyes: Negative. Respiratory:  Negative for cough, chest tightness, shortness of breath, wheezing and stridor. Cardiovascular:  Negative for chest pain and leg swelling.

## 2023-10-31 DIAGNOSIS — E78.5 HYPERLIPIDEMIA, UNSPECIFIED HYPERLIPIDEMIA TYPE: ICD-10-CM

## 2023-10-31 RX ORDER — OXYBUTYNIN CHLORIDE 10 MG/1
10 TABLET, EXTENDED RELEASE ORAL DAILY
Qty: 90 TABLET | Refills: 0 | Status: SHIPPED | OUTPATIENT
Start: 2023-10-31

## 2023-10-31 NOTE — TELEPHONE ENCOUNTER
Linda Brock called requesting a refill on the following medications:  Requested Prescriptions     Pending Prescriptions Disp Refills    oxybutynin (DITROPAN-XL) 10 MG extended release tablet [Pharmacy Med Name: oxyBUTYnin Chloride ER Oral Tablet Extended Release 24 Hour 10 MG] 90 tablet 0     Sig: TAKE 1 TABLET BY Andrew Meraz verified:  .[email or web address]      Date of last visit:   Date of next visit (if applicable): 5/45/8638

## 2023-11-02 NOTE — PROGRESS NOTES
[address]  [facility] [address]  Dept: [phone]  Dept Fax: [phone]  SUBJECTIVE     Jonn Cristobal is a 75 y.o.male    Pt presents for Medicare Annual Wellness physical exam.      Patient also presents for follow-up of IFG, Hyperlipidemia, Adjustment Disorder with Depressed mood, BPH, GERD, SUNIL, Allergic Rhinitis, and ED. 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 The home BP readings have not been necessary. Overall, pt doing well at this time- denies any new complaints. Wt Readings from Last 3 Encounters:   11/09/23 82.9 kg (182 lb 12.8 oz)   09/25/23 82.8 kg (182 lb 9.6 oz)   06/27/23 82.4 kg (181 lb 9.6 oz)   Weight decreased 6# since last visit 8 months ago. Pt stable since last visit- no new problems for diagnoses listed below:  Patient Active Problem List   Diagnosis    Hyperlipidemia    GERD (gastroesophageal reflux disease)    Chronic allergic rhinitis    Benign non-nodular prostatic hyperplasia with lower urinary tract symptoms    IFG (impaired fasting glucose)    Obstructive sleep apnea on CPAP    Insomnia    Erectile dysfunction    Adjustment disorder with depressed mood    Hiatal hernia       Review of Systems   Constitutional:  Negative for chills, diaphoresis, fatigue, fever and unexpected weight change. Eyes:  Negative for visual disturbance. Respiratory:  Negative for chest tightness and shortness of breath. Cardiovascular:  Negative for chest pain, palpitations and leg swelling. Gastrointestinal:  Negative for abdominal pain, anal bleeding, blood in stool, constipation, diarrhea, nausea and vomiting.

## 2023-11-03 RX ORDER — ATORVASTATIN CALCIUM 10 MG/1
TABLET, FILM COATED ORAL
Qty: 90 TABLET | Refills: 0 | Status: SHIPPED | OUTPATIENT
Start: 2023-11-03

## 2023-11-03 NOTE — TELEPHONE ENCOUNTER
Patient's last appointment was : 3/2/2023 with our   Patient's next appointment is : 11/9/2023 with our Dr. Miguelito Arboleda  Last refilled on: 7/25/2023  Which pharmacy does the script need sent to: [facility]      Lab Results   Component Value Date    LABA1C 5.6 10/07/2022     Lab Results   Component Value Date    CHOL 202 (H) 10/07/2022    TRIG 161 10/07/2022    HDL 52 10/07/2022    LDLCALC 118 10/07/2022    LDLDIRECT 91 04/09/2013     Lab Results   Component Value Date     10/07/2022    K 4.4 10/07/2022     10/07/2022    CO2 30 10/07/2022    BUN 19 10/07/2022    CREATININE 0.9 10/07/2022    GLUCOSE 103 10/07/2022    CALCIUM 9.6 10/07/2022    PROT 6.6 10/07/2022    LABALBU 4.1 10/07/2022    BILITOT 1.2 10/07/2022    ALKPHOS 92 10/07/2022    AST 18 10/07/2022    ALT 16 10/07/2022    LABGLOM 84 10/07/2022     Lab Results   Component Value Date    TSH 2.430 10/07/2022    T4FREE 0.98 10/07/2022     Lab Results   Component Value Date    WBC 5.8 10/07/2022    HGB 15.4 10/07/2022    HCT 45.7 10/07/2022    MCV 92.9 10/07/2022     10/07/2022

## 2023-11-06 SDOH — HEALTH STABILITY: PHYSICAL HEALTH: ON AVERAGE, HOW MANY MINUTES DO YOU ENGAGE IN EXERCISE AT THIS LEVEL?: 40 MIN

## 2023-11-06 SDOH — HEALTH STABILITY: PHYSICAL HEALTH: ON AVERAGE, HOW MANY DAYS PER WEEK DO YOU ENGAGE IN MODERATE TO STRENUOUS EXERCISE (LIKE A BRISK WALK)?: 2 DAYS

## 2023-11-06 ASSESSMENT — LIFESTYLE VARIABLES
HOW OFTEN DURING THE LAST YEAR HAVE YOU BEEN UNABLE TO REMEMBER WHAT HAPPENED THE NIGHT BEFORE BECAUSE YOU HAD BEEN DRINKING: NEVER
HOW OFTEN DURING THE LAST YEAR HAVE YOU BEEN UNABLE TO REMEMBER WHAT HAPPENED THE NIGHT BEFORE BECAUSE YOU HAD BEEN DRINKING: 0
HOW OFTEN DO YOU HAVE A DRINK CONTAINING ALCOHOL: 2
HOW OFTEN DURING THE LAST YEAR HAVE YOU FAILED TO DO WHAT WAS NORMALLY EXPECTED FROM YOU BECAUSE OF DRINKING: 0
HAS A RELATIVE, FRIEND, DOCTOR, OR ANOTHER HEALTH PROFESSIONAL EXPRESSED CONCERN ABOUT YOUR DRINKING OR SUGGESTED YOU CUT DOWN: 0
HOW OFTEN DURING THE LAST YEAR HAVE YOU HAD A FEELING OF GUILT OR REMORSE AFTER DRINKING: NEVER
HAS A RELATIVE, FRIEND, DOCTOR, OR ANOTHER HEALTH PROFESSIONAL EXPRESSED CONCERN ABOUT YOUR DRINKING OR SUGGESTED YOU CUT DOWN: NO
HOW MANY STANDARD DRINKS CONTAINING ALCOHOL DO YOU HAVE ON A TYPICAL DAY: 2
HOW OFTEN DURING THE LAST YEAR HAVE YOU FAILED TO DO WHAT WAS NORMALLY EXPECTED FROM YOU BECAUSE OF DRINKING: NEVER
HAVE YOU OR SOMEONE ELSE BEEN INJURED AS A RESULT OF YOUR DRINKING: 0
HOW OFTEN DURING THE LAST YEAR HAVE YOU NEEDED AN ALCOHOLIC DRINK FIRST THING IN THE MORNING TO GET YOURSELF GOING AFTER A NIGHT OF HEAVY DRINKING: NEVER
HAVE YOU OR SOMEONE ELSE BEEN INJURED AS A RESULT OF YOUR DRINKING: NO
HOW OFTEN DO YOU HAVE A DRINK CONTAINING ALCOHOL: MONTHLY OR LESS
HOW OFTEN DURING THE LAST YEAR HAVE YOU FOUND THAT YOU WERE NOT ABLE TO STOP DRINKING ONCE YOU HAD STARTED: 0
HOW OFTEN DO YOU HAVE SIX OR MORE DRINKS ON ONE OCCASION: 2
HOW OFTEN DURING THE LAST YEAR HAVE YOU HAD A FEELING OF GUILT OR REMORSE AFTER DRINKING: 0
HOW OFTEN DURING THE LAST YEAR HAVE YOU FOUND THAT YOU WERE NOT ABLE TO STOP DRINKING ONCE YOU HAD STARTED: NEVER
HOW MANY STANDARD DRINKS CONTAINING ALCOHOL DO YOU HAVE ON A TYPICAL DAY: 3 OR 4
HOW OFTEN DURING THE LAST YEAR HAVE YOU NEEDED AN ALCOHOLIC DRINK FIRST THING IN THE MORNING TO GET YOURSELF GOING AFTER A NIGHT OF HEAVY DRINKING: 0

## 2023-11-06 ASSESSMENT — PATIENT HEALTH QUESTIONNAIRE - PHQ9
SUM OF ALL RESPONSES TO PHQ QUESTIONS 1-9: 0
2. FEELING DOWN, DEPRESSED OR HOPELESS: 0
1. LITTLE INTEREST OR PLEASURE IN DOING THINGS: 0
SUM OF ALL RESPONSES TO PHQ9 QUESTIONS 1 & 2: 0

## 2023-11-08 ENCOUNTER — NURSE ONLY (OUTPATIENT)
Dept: LAB | Age: 68
End: 2023-11-08

## 2023-11-08 DIAGNOSIS — G47.09 OTHER INSOMNIA: ICD-10-CM

## 2023-11-08 DIAGNOSIS — Z12.5 SCREENING PSA (PROSTATE SPECIFIC ANTIGEN): ICD-10-CM

## 2023-11-08 DIAGNOSIS — N52.9 ERECTILE DYSFUNCTION, UNSPECIFIED ERECTILE DYSFUNCTION TYPE: ICD-10-CM

## 2023-11-08 DIAGNOSIS — G47.33 OBSTRUCTIVE SLEEP APNEA ON CPAP: ICD-10-CM

## 2023-11-08 DIAGNOSIS — R73.01 IFG (IMPAIRED FASTING GLUCOSE): ICD-10-CM

## 2023-11-08 DIAGNOSIS — E78.5 HYPERLIPIDEMIA, UNSPECIFIED HYPERLIPIDEMIA TYPE: ICD-10-CM

## 2023-11-08 DIAGNOSIS — N40.1 BENIGN NON-NODULAR PROSTATIC HYPERPLASIA WITH LOWER URINARY TRACT SYMPTOMS: ICD-10-CM

## 2023-11-08 DIAGNOSIS — F43.21 ADJUSTMENT DISORDER WITH DEPRESSED MOOD: ICD-10-CM

## 2023-11-08 DIAGNOSIS — K21.9 GASTROESOPHAGEAL REFLUX DISEASE, UNSPECIFIED WHETHER ESOPHAGITIS PRESENT: ICD-10-CM

## 2023-11-08 LAB
ALBUMIN SERPL BCG-MCNC: 4.5 G/DL (ref 3.5–5.1)
ALP SERPL-CCNC: 79 U/L (ref 38–126)
ALT SERPL W/O P-5'-P-CCNC: 15 U/L (ref 11–66)
ANION GAP SERPL CALC-SCNC: 8 MEQ/L (ref 8–16)
AST SERPL-CCNC: 17 U/L (ref 5–40)
BASOPHILS ABSOLUTE: 0 THOU/MM3 (ref 0–0.1)
BASOPHILS NFR BLD AUTO: 0.5 %
BILIRUB SERPL-MCNC: 1 MG/DL (ref 0.3–1.2)
BUN SERPL-MCNC: 18 MG/DL (ref 7–22)
CALCIUM SERPL-MCNC: 9.5 MG/DL (ref 8.5–10.5)
CHLORIDE SERPL-SCNC: 102 MEQ/L (ref 98–111)
CHOLEST SERPL-MCNC: 121 MG/DL (ref 100–199)
CO2 SERPL-SCNC: 32 MEQ/L (ref 23–33)
CREAT SERPL-MCNC: 0.9 MG/DL (ref 0.4–1.2)
DEPRECATED MEAN GLUCOSE BLD GHB EST-ACNC: 108 MG/DL (ref 70–126)
DEPRECATED RDW RBC AUTO: 44.3 FL (ref 35–45)
EOSINOPHIL NFR BLD AUTO: 1.2 %
EOSINOPHILS ABSOLUTE: 0.1 THOU/MM3 (ref 0–0.4)
ERYTHROCYTE [DISTWIDTH] IN BLOOD BY AUTOMATED COUNT: 12.4 % (ref 11.5–14.5)
GFR SERPL CREATININE-BSD FRML MDRD: > 60 ML/MIN/1.73M2
GLUCOSE SERPL-MCNC: 106 MG/DL (ref 70–108)
HBA1C MFR BLD HPLC: 5.6 % (ref 4.4–6.4)
HCT VFR BLD AUTO: 45.8 % (ref 42–52)
HDLC SERPL-MCNC: 47 MG/DL
HGB BLD-MCNC: 14.9 GM/DL (ref 14–18)
IMM GRANULOCYTES # BLD AUTO: 0.02 THOU/MM3 (ref 0–0.07)
IMM GRANULOCYTES NFR BLD AUTO: 0.3 %
LDLC SERPL CALC-MCNC: 53 MG/DL
LYMPHOCYTES ABSOLUTE: 1.4 THOU/MM3 (ref 1–4.8)
LYMPHOCYTES NFR BLD AUTO: 21.7 %
MCH RBC QN AUTO: 31.2 PG (ref 26–33)
MCHC RBC AUTO-ENTMCNC: 32.5 GM/DL (ref 32.2–35.5)
MCV RBC AUTO: 96 FL (ref 80–94)
MONOCYTES ABSOLUTE: 0.5 THOU/MM3 (ref 0.4–1.3)
MONOCYTES NFR BLD AUTO: 7.8 %
NEUTROPHILS NFR BLD AUTO: 68.5 %
NRBC BLD AUTO-RTO: 0 /100 WBC
PLATELET # BLD AUTO: 150 THOU/MM3 (ref 130–400)
PMV BLD AUTO: 11.7 FL (ref 9.4–12.4)
POTASSIUM SERPL-SCNC: 4.3 MEQ/L (ref 3.5–5.2)
PROT SERPL-MCNC: 6.8 G/DL (ref 6.1–8)
PSA SERPL-MCNC: 2.19 NG/ML (ref 0–1)
RBC # BLD AUTO: 4.77 MILL/MM3 (ref 4.7–6.1)
SEGMENTED NEUTROPHILS ABSOLUTE COUNT: 4.5 THOU/MM3 (ref 1.8–7.7)
SODIUM SERPL-SCNC: 142 MEQ/L (ref 135–145)
T4 FREE SERPL-MCNC: 1.04 NG/DL (ref 0.93–1.76)
TRIGL SERPL-MCNC: 103 MG/DL (ref 0–199)
TSH SERPL DL<=0.005 MIU/L-ACNC: 2.15 UIU/ML (ref 0.4–4.2)
WBC # BLD AUTO: 6.5 THOU/MM3 (ref 4.8–10.8)

## 2023-11-09 ENCOUNTER — OFFICE VISIT (OUTPATIENT)
Dept: FAMILY MEDICINE CLINIC | Age: 68
End: 2023-11-09
Payer: MEDICARE

## 2023-11-09 VITALS
HEART RATE: 67 BPM | SYSTOLIC BLOOD PRESSURE: 118 MMHG | TEMPERATURE: 98.1 F | WEIGHT: 182.8 LBS | HEIGHT: 68 IN | RESPIRATION RATE: 12 BRPM | DIASTOLIC BLOOD PRESSURE: 62 MMHG | BODY MASS INDEX: 27.71 KG/M2 | OXYGEN SATURATION: 99 %

## 2023-11-09 DIAGNOSIS — N52.9 ERECTILE DYSFUNCTION, UNSPECIFIED ERECTILE DYSFUNCTION TYPE: ICD-10-CM

## 2023-11-09 DIAGNOSIS — G47.33 OBSTRUCTIVE SLEEP APNEA ON CPAP: ICD-10-CM

## 2023-11-09 DIAGNOSIS — F43.21 ADJUSTMENT DISORDER WITH DEPRESSED MOOD: ICD-10-CM

## 2023-11-09 DIAGNOSIS — G47.09 OTHER INSOMNIA: ICD-10-CM

## 2023-11-09 DIAGNOSIS — Z00.00 MEDICARE ANNUAL WELLNESS VISIT, SUBSEQUENT: Primary | ICD-10-CM

## 2023-11-09 DIAGNOSIS — R73.01 IFG (IMPAIRED FASTING GLUCOSE): ICD-10-CM

## 2023-11-09 DIAGNOSIS — J30.9 CHRONIC ALLERGIC RHINITIS: ICD-10-CM

## 2023-11-09 DIAGNOSIS — K21.9 GASTROESOPHAGEAL REFLUX DISEASE, UNSPECIFIED WHETHER ESOPHAGITIS PRESENT: ICD-10-CM

## 2023-11-09 DIAGNOSIS — N40.1 BENIGN NON-NODULAR PROSTATIC HYPERPLASIA WITH LOWER URINARY TRACT SYMPTOMS: ICD-10-CM

## 2023-11-09 DIAGNOSIS — E78.5 HYPERLIPIDEMIA, UNSPECIFIED HYPERLIPIDEMIA TYPE: ICD-10-CM

## 2023-11-09 PROCEDURE — 3017F COLORECTAL CA SCREEN DOC REV: CPT | Performed by: FAMILY MEDICINE

## 2023-11-09 PROCEDURE — G8484 FLU IMMUNIZE NO ADMIN: HCPCS | Performed by: FAMILY MEDICINE

## 2023-11-09 PROCEDURE — 1123F ACP DISCUSS/DSCN MKR DOCD: CPT | Performed by: FAMILY MEDICINE

## 2023-11-09 PROCEDURE — G0439 PPPS, SUBSEQ VISIT: HCPCS | Performed by: FAMILY MEDICINE

## 2023-11-09 RX ORDER — ATORVASTATIN CALCIUM 10 MG/1
10 TABLET, FILM COATED ORAL DAILY
Qty: 90 TABLET | Refills: 3 | Status: SHIPPED | OUTPATIENT
Start: 2023-11-09

## 2023-11-09 ASSESSMENT — ENCOUNTER SYMPTOMS
NAUSEA: 0
CHEST TIGHTNESS: 0
DIARRHEA: 0
VOMITING: 0
ANAL BLEEDING: 0
BLOOD IN STOOL: 0
ABDOMINAL PAIN: 0
SHORTNESS OF BREATH: 0
CONSTIPATION: 0

## 2023-11-09 NOTE — PATIENT INSTRUCTIONS
Continue current medicines   Refills   Add B Complex multivitamin daily at this time. Consider addition of Zoloft 50 mg daily at this time- pt would like to hold and let me know in future if desires. Will need to stop Doxepin entirely at that time. Follow up in 12 months with me. Preventive Health Topics (updated 11/9/2023):  Encouraged COVID VACCINE booster- pt declines today and would like to consider over next 1-2 weeks. Encouraged annual FLU SHOT- pt declines. Encouraged TETANUS SHOT (TdaP/ ADACEL/ BOOSTRIX)- check with insurance re coverage and location of administration. ([facility] vs local pharmacy)  Encouraged PREVNAR 20 at this time  COLONOSCOPY done 4/8/2016 per Dr. Federico Butt- to do again in 2026. Pt following with Dr. Naga Mckeon at this time. Learning About Emotional Support  When do you need emotional support? You might find getting support from others helpful when you have a long-term health problem. Often people feel alone, confused, or scared when coping with an illness. But you aren't alone. Other people are going through the same thing you are and know how you feel. Talking with others about your feelings can help you feel better. Your family and friends can give you support. So can your doctor, a support group, or a Voodoo. If you have a support network, you will not feel as alone. You will learn new ways to deal with your situation, and you may try harder to overcome it. Where you can get support  Family and friends: They can help you cope by giving you comfort and encouragement. Counseling: Professional counseling can help you cope with situations that interfere with your life and cause stress. Counseling can help you understand and deal with your illness. Your doctor: Find a doctor you trust and feel comfortable with. Be open and honest about your fears and concerns.  Your doctor can help you get the right medical treatments, including

## 2023-12-01 ENCOUNTER — APPOINTMENT (OUTPATIENT)
Dept: GENERAL RADIOLOGY | Age: 68
End: 2023-12-01
Payer: MEDICARE

## 2023-12-01 ENCOUNTER — HOSPITAL ENCOUNTER (EMERGENCY)
Age: 68
Discharge: HOME | End: 2023-12-01
Attending: STUDENT IN AN ORGANIZED HEALTH CARE EDUCATION/TRAINING PROGRAM
Payer: MEDICARE

## 2023-12-01 VITALS
BODY MASS INDEX: 27.28 KG/M2 | HEIGHT: 68 IN | OXYGEN SATURATION: 94 % | WEIGHT: 180 LBS | SYSTOLIC BLOOD PRESSURE: 134 MMHG | RESPIRATION RATE: 14 BRPM | DIASTOLIC BLOOD PRESSURE: 90 MMHG | TEMPERATURE: 97.6 F | HEART RATE: 71 BPM

## 2023-12-01 DIAGNOSIS — K21.00 GASTROESOPHAGEAL REFLUX DISEASE WITH ESOPHAGITIS WITHOUT HEMORRHAGE: Primary | ICD-10-CM

## 2023-12-01 LAB
ALBUMIN SERPL BCG-MCNC: 4.1 G/DL (ref 3.5–5.1)
ALP SERPL-CCNC: 75 U/L (ref 38–126)
ALT SERPL W/O P-5'-P-CCNC: 20 U/L (ref 11–66)
ANION GAP SERPL CALC-SCNC: 8 MEQ/L (ref 8–16)
AST SERPL-CCNC: 16 U/L (ref 5–40)
BASOPHILS ABSOLUTE: 0 THOU/MM3 (ref 0–0.1)
BASOPHILS NFR BLD AUTO: 0.6 %
BILIRUB CONJ SERPL-MCNC: < 0.2 MG/DL (ref 0–0.3)
BILIRUB SERPL-MCNC: 1.1 MG/DL (ref 0.3–1.2)
BUN SERPL-MCNC: 15 MG/DL (ref 7–22)
CALCIUM SERPL-MCNC: 9.2 MG/DL (ref 8.5–10.5)
CHLORIDE SERPL-SCNC: 103 MEQ/L (ref 98–111)
CO2 SERPL-SCNC: 30 MEQ/L (ref 23–33)
CREAT SERPL-MCNC: 0.9 MG/DL (ref 0.4–1.2)
DEPRECATED RDW RBC AUTO: 43.3 FL (ref 35–45)
EKG ATRIAL RATE: 58 BPM
EKG P AXIS: 63 DEGREES
EKG P-R INTERVAL: 174 MS
EKG Q-T INTERVAL: 436 MS
EKG QRS DURATION: 90 MS
EKG QTC CALCULATION (BAZETT): 428 MS
EKG R AXIS: 31 DEGREES
EKG T AXIS: 11 DEGREES
EKG VENTRICULAR RATE: 58 BPM
EOSINOPHIL NFR BLD AUTO: 1.9 %
EOSINOPHILS ABSOLUTE: 0.1 THOU/MM3 (ref 0–0.4)
ERYTHROCYTE [DISTWIDTH] IN BLOOD BY AUTOMATED COUNT: 12.6 % (ref 11.5–14.5)
GFR SERPL CREATININE-BSD FRML MDRD: > 60 ML/MIN/1.73M2
GLUCOSE SERPL-MCNC: 106 MG/DL (ref 70–108)
HCT VFR BLD AUTO: 44 % (ref 42–52)
HGB BLD-MCNC: 14.5 GM/DL (ref 14–18)
IMM GRANULOCYTES # BLD AUTO: 0.01 THOU/MM3 (ref 0–0.07)
IMM GRANULOCYTES NFR BLD AUTO: 0.2 %
LIPASE SERPL-CCNC: 17.8 U/L (ref 5.6–51.3)
LYMPHOCYTES ABSOLUTE: 1.8 THOU/MM3 (ref 1–4.8)
LYMPHOCYTES NFR BLD AUTO: 34.2 %
MAGNESIUM SERPL-MCNC: 2.1 MG/DL (ref 1.6–2.4)
MCH RBC QN AUTO: 30.7 PG (ref 26–33)
MCHC RBC AUTO-ENTMCNC: 33 GM/DL (ref 32.2–35.5)
MCV RBC AUTO: 93 FL (ref 80–94)
MONOCYTES ABSOLUTE: 0.6 THOU/MM3 (ref 0.4–1.3)
MONOCYTES NFR BLD AUTO: 10.6 %
NEUTROPHILS NFR BLD AUTO: 52.5 %
NRBC BLD AUTO-RTO: 0 /100 WBC
NT-PROBNP SERPL IA-MCNC: 59.4 PG/ML (ref 0–124)
OSMOLALITY SERPL CALC.SUM OF ELEC: 282.5 MOSMOL/KG (ref 275–300)
PLATELET # BLD AUTO: 148 THOU/MM3 (ref 130–400)
PMV BLD AUTO: 10.7 FL (ref 9.4–12.4)
POTASSIUM SERPL-SCNC: 4.2 MEQ/L (ref 3.5–5.2)
PROT SERPL-MCNC: 6.5 G/DL (ref 6.1–8)
RBC # BLD AUTO: 4.73 MILL/MM3 (ref 4.7–6.1)
SEGMENTED NEUTROPHILS ABSOLUTE COUNT: 2.7 THOU/MM3 (ref 1.8–7.7)
SODIUM SERPL-SCNC: 141 MEQ/L (ref 135–145)
TROPONIN, HIGH SENSITIVITY: 7 NG/L (ref 0–12)
WBC # BLD AUTO: 5.2 THOU/MM3 (ref 4.8–10.8)

## 2023-12-01 PROCEDURE — A4216 STERILE WATER/SALINE, 10 ML: HCPCS

## 2023-12-01 PROCEDURE — 6370000000 HC RX 637 (ALT 250 FOR IP)

## 2023-12-01 PROCEDURE — 93010 ELECTROCARDIOGRAM REPORT: CPT | Performed by: INTERNAL MEDICINE

## 2023-12-01 PROCEDURE — 93005 ELECTROCARDIOGRAM TRACING: CPT | Performed by: STUDENT IN AN ORGANIZED HEALTH CARE EDUCATION/TRAINING PROGRAM

## 2023-12-01 PROCEDURE — 2580000003 HC RX 258

## 2023-12-01 PROCEDURE — 99285 EMERGENCY DEPT VISIT HI MDM: CPT

## 2023-12-01 PROCEDURE — 36415 COLL VENOUS BLD VENIPUNCTURE: CPT

## 2023-12-01 PROCEDURE — 80048 BASIC METABOLIC PNL TOTAL CA: CPT

## 2023-12-01 PROCEDURE — 71045 X-RAY EXAM CHEST 1 VIEW: CPT

## 2023-12-01 PROCEDURE — 96374 THER/PROPH/DIAG INJ IV PUSH: CPT

## 2023-12-01 PROCEDURE — 83880 ASSAY OF NATRIURETIC PEPTIDE: CPT

## 2023-12-01 PROCEDURE — 83735 ASSAY OF MAGNESIUM: CPT

## 2023-12-01 PROCEDURE — 80076 HEPATIC FUNCTION PANEL: CPT

## 2023-12-01 PROCEDURE — 83690 ASSAY OF LIPASE: CPT

## 2023-12-01 PROCEDURE — 85025 COMPLETE CBC W/AUTO DIFF WBC: CPT

## 2023-12-01 PROCEDURE — 2500000003 HC RX 250 WO HCPCS

## 2023-12-01 PROCEDURE — 84484 ASSAY OF TROPONIN QUANT: CPT

## 2023-12-01 RX ADMIN — FAMOTIDINE 40 MG: 10 INJECTION, SOLUTION INTRAVENOUS at 05:07

## 2023-12-01 RX ADMIN — ALUMINUM HYDROXIDE, MAGNESIUM HYDROXIDE, AND SIMETHICONE: 200; 200; 20 SUSPENSION ORAL at 04:37

## 2023-12-01 ASSESSMENT — PAIN - FUNCTIONAL ASSESSMENT
PAIN_FUNCTIONAL_ASSESSMENT: 0-10
PAIN_FUNCTIONAL_ASSESSMENT: 0-10

## 2023-12-01 ASSESSMENT — PAIN SCALES - GENERAL
PAINLEVEL_OUTOF10: 3
PAINLEVEL_OUTOF10: 3

## 2023-12-01 NOTE — ED PROVIDER NOTES
Decision Rules/Clinical Scores utilized:  Not Applicable     Plan:   Lab work, chest x-ray, EKG  GI cocktail       Code Status:  Not addressed during this ED visit    Social determinants of health impacting treatment or disposition:  Considered and not applicable       PREVIOUS RECORDS  AND EXTERNAL INFORMATION REVIEWED   History obtained from: the patient. Pertinent previous and/or external records reviewed: Noncontributory. Case discussed with specialties other than Emergency Medicine: Not Applicable      ED COURSE   ED Medications administered this visit (None if left blank):   Medications   aluminum & magnesium hydroxide-simethicone (MAALOX) 30 mL, lidocaine viscous hcl (XYLOCAINE) 5 mL (GI COCKTAIL) ( Oral Given 12/1/23 0432)   famotidine (PEPCID) 40 mg in sodium chloride (PF) 0.9 % 10 mL injection (40 mg IntraVENous Given 12/1/23 0495)                PROCEDURES: (None if blank)  Procedures:       MEDICATION CHANGES     New Prescriptions    No medications on file         FINAL DISPOSITION   Medical Decision Making    50-year-old male significant history of GERD and esophagitis presents to the ED complaining of abdominal pain in the epigastric area and lower central substernal chest pain. Patient's lab work and chest x-ray and EKG are within normal limits. Patient felt better after GI cocktail and Pepcid IV. The results and clinical impression of GERD and esophagitis were discussed with patient. He expressed understanding of the diagnosis and results and agreement with the treatment plan of discharge home. CRITICAL CARE:  None    Shared Decision-Making was performed, disposition discussed with the patient/family and questions answered.       Outpatient follow up (If applicable):  Arabella Aguirre MD  [address] [place] [address]  [phone]      As needed        FINAL DIAGNOSES:  Final diagnoses:   Gastroesophageal reflux disease with esophagitis without hemorrhage       Condition:

## 2023-12-01 NOTE — ED TRIAGE NOTES
Pt comes to ED with c/o chest pain and abdominal pain since 11/28. Pt states that the pain is intermittent. Pt report that the pain woke him up this morning and he hasn't been able to sleep since. Pt denies medication pta. Pt reports hx of GERD.

## 2023-12-01 NOTE — ED NOTES
Pt resting quietly in bed at this time. Pt medicated per MAR. Pt updated on POC, verbalized understanding. Call light in reach. Telemetry in place.       Fernando Reaves RN  12/01/23 2256

## 2024-01-16 ENCOUNTER — OFFICE VISIT (OUTPATIENT)
Dept: UROLOGY | Age: 69
End: 2024-01-16
Payer: MEDICARE

## 2024-01-16 VITALS — RESPIRATION RATE: 14 BRPM | WEIGHT: 180 LBS | HEIGHT: 68 IN | BODY MASS INDEX: 27.28 KG/M2

## 2024-01-16 DIAGNOSIS — R39.15 URINARY URGENCY: ICD-10-CM

## 2024-01-16 DIAGNOSIS — N52.9 ERECTILE DYSFUNCTION, UNSPECIFIED ERECTILE DYSFUNCTION TYPE: ICD-10-CM

## 2024-01-16 DIAGNOSIS — Z12.5 PROSTATE CANCER SCREENING: ICD-10-CM

## 2024-01-16 DIAGNOSIS — N40.1 BENIGN LOCALIZED PROSTATIC HYPERPLASIA WITH LOWER URINARY TRACT SYMPTOMS (LUTS): Primary | ICD-10-CM

## 2024-01-16 PROCEDURE — G8427 DOCREV CUR MEDS BY ELIG CLIN: HCPCS

## 2024-01-16 PROCEDURE — 99214 OFFICE O/P EST MOD 30 MIN: CPT

## 2024-01-16 PROCEDURE — G8417 CALC BMI ABV UP PARAM F/U: HCPCS

## 2024-01-16 PROCEDURE — 1123F ACP DISCUSS/DSCN MKR DOCD: CPT

## 2024-01-16 PROCEDURE — 3017F COLORECTAL CA SCREEN DOC REV: CPT

## 2024-01-16 PROCEDURE — G8484 FLU IMMUNIZE NO ADMIN: HCPCS

## 2024-01-16 PROCEDURE — 1036F TOBACCO NON-USER: CPT

## 2024-01-16 NOTE — PROGRESS NOTES
[facility] [place] [facility]  [facility] UROLOG[facility]  [address]  [address]  [facility] [address]  Dept: [phone]  Loc: [phone]  Visit Date: 1/16/2024    HPI  Rajendra Yanez is a 68 y.o. male that presents to the urology clinic for PSA and symptom check.    BPH w/ Urinary Urgency  LUTS managed on Flomax 0.4 mg BID and Oxybutynin XL 10 mg. Stable vs previous visit.   IPSS of 9/2. No prior TURP.     Erectile Dysfunction  Patient with difficulty spontaneously achieving and maintaining erections. Doing well on Cialis 5 mg daily dosing.     PSA Trend  2.19   (11/08/23)  2.15   (10/07/22)  2.44   (10/09/21)        Last BUN and creatinine:  Lab Results   Component Value Date    BUN 15 12/01/2023     Lab Results   Component Value Date    CREATININE 0.9 12/01/2023           PAST MEDICAL, FAMILY AND SOCIAL HISTORY UPDATE:  Past Medical History:   Diagnosis Date    Allergic     Allergic rhinitis     Arthritis     Back pain     Depression     GERD (gastroesophageal reflux disease)     Hyperlipidemia     Prolonged emergence from general anesthesia     Prostatism     Unspecified sleep apnea     has CPAP     Past Surgical History:   Procedure Laterality Date    CARDIAC CATHETERIZATION      COLONOSCOPY  2011    ESOPHAGEAL MOTILITY STUDY N/A 2/17/2022    ESOPHAGEAL MOTILITY/MANOMETRY STUDY performed by Brigido Mitchell MD at [facility]    ESOPHAGEAL PH MONITORING N/A 2/16/2021    ESOPHAGEAL PH MONITORING (NO EGD) performed by Brigido Mitchell MD at [facility]    EYE SURGERY      GASTRIC FUNDOPLICATION N/A 3/15/2021    ROBOTIC LINX, Hiatal hernia repair, Umbilical hernia repair. performed by Gregg Kim MD at [facility] OR    OTHER SURGICAL HISTORY      tendonitus repair     RETINAL DETACHMENT SURGERY      UPPER GASTROINTESTINAL ENDOSCOPY N/A 1/15/2021    RANDALL performed by Sangeeta Spencer MD at [facility]    UPPER GASTROINTESTINAL ENDOSCOPY  1/15/2021    EGD BIOPSY performed by Sangeeta

## 2024-02-14 RX ORDER — OXYBUTYNIN CHLORIDE 10 MG/1
10 TABLET, EXTENDED RELEASE ORAL DAILY
Qty: 90 TABLET | Refills: 3 | Status: SHIPPED | OUTPATIENT
Start: 2024-02-14

## 2024-02-14 NOTE — TELEPHONE ENCOUNTER
Rajendra Yanez called requesting a refill on the following medications:  Requested Prescriptions     Pending Prescriptions Disp Refills    oxyBUTYnin (DITROPAN-XL) 10 MG extended release tablet [Pharmacy Med Name: oxyBUTYnin Chloride ER Oral Tablet Extended Release 24 Hour 10 MG] 90 tablet 0     Sig: TAKE 1 TABLET BY MOUTH EVERY DAY     Pharmacy verified:  .[email or web address]      Date of last visit: 01/16/2024  Date of next visit (if applicable): 01/16/2025

## 2024-03-15 RX ORDER — TADALAFIL 5 MG/1
5 TABLET ORAL DAILY
Qty: 30 TABLET | Refills: 11 | Status: SHIPPED | OUTPATIENT
Start: 2024-03-15

## 2024-03-15 NOTE — TELEPHONE ENCOUNTER
Rajendra Yanez called requesting a refill on the following medications:  Requested Prescriptions     Pending Prescriptions Disp Refills    tadalafil (CIALIS) 5 MG tablet 30 tablet 11     Sig: Take 1 tablet by mouth daily     Pharmacy verified: [facility]  .      Date of last visit: 1/16/2024  Date of next visit (if applicable): 1/16/2025

## 2024-03-26 ENCOUNTER — OFFICE VISIT (OUTPATIENT)
Dept: PULMONOLOGY | Age: 69
End: 2024-03-26
Payer: MEDICARE

## 2024-03-26 VITALS
TEMPERATURE: 97.7 F | DIASTOLIC BLOOD PRESSURE: 60 MMHG | OXYGEN SATURATION: 96 % | HEART RATE: 62 BPM | SYSTOLIC BLOOD PRESSURE: 110 MMHG | BODY MASS INDEX: 28.43 KG/M2 | WEIGHT: 187.6 LBS | HEIGHT: 68 IN

## 2024-03-26 DIAGNOSIS — G47.33 OSA ON CPAP: Primary | ICD-10-CM

## 2024-03-26 DIAGNOSIS — G47.09 OTHER INSOMNIA: ICD-10-CM

## 2024-03-26 PROCEDURE — 1123F ACP DISCUSS/DSCN MKR DOCD: CPT | Performed by: NURSE PRACTITIONER

## 2024-03-26 PROCEDURE — 99214 OFFICE O/P EST MOD 30 MIN: CPT | Performed by: NURSE PRACTITIONER

## 2024-03-26 PROCEDURE — G8427 DOCREV CUR MEDS BY ELIG CLIN: HCPCS | Performed by: NURSE PRACTITIONER

## 2024-03-26 PROCEDURE — G8484 FLU IMMUNIZE NO ADMIN: HCPCS | Performed by: NURSE PRACTITIONER

## 2024-03-26 PROCEDURE — 3017F COLORECTAL CA SCREEN DOC REV: CPT | Performed by: NURSE PRACTITIONER

## 2024-03-26 PROCEDURE — 1036F TOBACCO NON-USER: CPT | Performed by: NURSE PRACTITIONER

## 2024-03-26 PROCEDURE — G8417 CALC BMI ABV UP PARAM F/U: HCPCS | Performed by: NURSE PRACTITIONER

## 2024-03-26 ASSESSMENT — ENCOUNTER SYMPTOMS
VOMITING: 0
EYES NEGATIVE: 1
NAUSEA: 0
ALLERGIC/IMMUNOLOGIC NEGATIVE: 1
WHEEZING: 0
DIARRHEA: 0
CHEST TIGHTNESS: 0
STRIDOR: 0
GASTROINTESTINAL NEGATIVE: 1
RESPIRATORY NEGATIVE: 1

## 2024-03-26 NOTE — PROGRESS NOTES
[facility]      Rajendra Yanez         [id number]  3/26/2024   Chief Complaint   Patient presents with    Follow-up     SUNIL 6 month f/u with [facility] download.         Pt of Lilibeth    PAP Download:   Original or initial AHI: 28.4     Date of initial study: 11/2013      Compliant  97%     Noncompliant 0 %     PAP Type CPAP Level  10   Avg Hrs/Day 6 hours 19 minutes  AHI: 0.3   Leaks : 95 th percentile: 28.1   Recorded compliance dates , 2/24/24  to 3/24/24   Machine/Mfg:   [x] ResMed    [] Respironics/Dreamstation   Interface:   [x] Nasal    [] Nasal pillows   [] FFM      Provider:      [x] [facility]     [][employer]     [] [employer]    [] [employer]    [] [employer]               [] [employer]      [] Adaptive    [] [place]:      [] Other    Neck Size: 15.25  Mallampati 1  ESS:  12  SAQLI: 72    Here is a scan of the most recent download:              Presentation:   [facility]  Since the last visit, Rajendra has been compliant with his PAP therapy and continues to see benefit from its use.  Awake and alert today   AHI is well controlled   BMI 28  Doxepin use at night taking 1/2 tab but is concerned with possible GI side effects but patient is on rather low dose   Issues with heartburn as well sees Dr. Pierson - sleeps up in chair at night   Previously on Lunesta nightly - would like to have to take PRN will discuss again at next visit     Progress History:   Since last visit any new medical issues? No  Any trouble with Machine No  Any new sleep medicines? no  Trouble Falling Asleep No  Trouble Staying Asleep No  Snoring no    Equipment issues:  The pressure is  acceptable, the mask is acceptable     Review of Systems -   Review of Systems   Constitutional: Negative.  Negative for chills, fever and unexpected weight change.   HENT: Negative.     Eyes: Negative.    Respiratory: Negative.  Negative for chest tightness,

## 2024-04-11 ENCOUNTER — TELEPHONE (OUTPATIENT)
Dept: PULMONOLOGY | Age: 69
End: 2024-04-11

## 2024-04-23 ENCOUNTER — HOSPITAL ENCOUNTER (OUTPATIENT)
Dept: CT IMAGING | Age: 69
Discharge: HOME | End: 2024-04-23
Payer: MEDICARE

## 2024-04-23 DIAGNOSIS — R10.10 UPPER ABDOMINAL PAIN: ICD-10-CM

## 2024-04-23 DIAGNOSIS — Z87.19 HEALED YELLOW ATROPHY OF LIVER: ICD-10-CM

## 2024-04-23 DIAGNOSIS — Z98.890 PERSONAL HISTORY OF SURGERY TO HEART AND GREAT VESSELS, PRESENTING HAZARDS TO HEALTH: ICD-10-CM

## 2024-04-23 PROCEDURE — 6360000004 HC RX CONTRAST MEDICATION: Performed by: NURSE PRACTITIONER

## 2024-04-23 PROCEDURE — 74177 CT ABD & PELVIS W/CONTRAST: CPT

## 2024-04-23 RX ADMIN — IOPAMIDOL 100 ML: 755 INJECTION, SOLUTION INTRAVENOUS at 13:49

## 2024-06-12 SDOH — ECONOMIC STABILITY: FOOD INSECURITY: WITHIN THE PAST 12 MONTHS, YOU WORRIED THAT YOUR FOOD WOULD RUN OUT BEFORE YOU GOT MONEY TO BUY MORE.: NEVER TRUE

## 2024-06-12 SDOH — ECONOMIC STABILITY: FOOD INSECURITY: WITHIN THE PAST 12 MONTHS, THE FOOD YOU BOUGHT JUST DIDN'T LAST AND YOU DIDN'T HAVE MONEY TO GET MORE.: NEVER TRUE

## 2024-06-12 SDOH — ECONOMIC STABILITY: INCOME INSECURITY: HOW HARD IS IT FOR YOU TO PAY FOR THE VERY BASICS LIKE FOOD, HOUSING, MEDICAL CARE, AND HEATING?: NOT HARD AT ALL

## 2024-06-12 ASSESSMENT — PATIENT HEALTH QUESTIONNAIRE - PHQ9
2. FEELING DOWN, DEPRESSED OR HOPELESS: SEVERAL DAYS
SUM OF ALL RESPONSES TO PHQ9 QUESTIONS 1 & 2: 1
1. LITTLE INTEREST OR PLEASURE IN DOING THINGS: NOT AT ALL
SUM OF ALL RESPONSES TO PHQ QUESTIONS 1-9: 1
SUM OF ALL RESPONSES TO PHQ QUESTIONS 1-9: 1
1. LITTLE INTEREST OR PLEASURE IN DOING THINGS: NOT AT ALL
SUM OF ALL RESPONSES TO PHQ QUESTIONS 1-9: 1
SUM OF ALL RESPONSES TO PHQ QUESTIONS 1-9: 1
SUM OF ALL RESPONSES TO PHQ9 QUESTIONS 1 & 2: 1
2. FEELING DOWN, DEPRESSED OR HOPELESS: SEVERAL DAYS

## 2024-06-13 ENCOUNTER — OFFICE VISIT (OUTPATIENT)
Dept: FAMILY MEDICINE CLINIC | Age: 69
End: 2024-06-13

## 2024-06-13 VITALS
OXYGEN SATURATION: 98 % | BODY MASS INDEX: 27.22 KG/M2 | RESPIRATION RATE: 21 BRPM | HEART RATE: 82 BPM | WEIGHT: 179.6 LBS | HEIGHT: 68 IN | TEMPERATURE: 98.2 F | SYSTOLIC BLOOD PRESSURE: 122 MMHG | DIASTOLIC BLOOD PRESSURE: 74 MMHG

## 2024-06-13 DIAGNOSIS — K40.20 NON-RECURRENT BILATERAL INGUINAL HERNIA WITHOUT OBSTRUCTION OR GANGRENE: ICD-10-CM

## 2024-06-13 DIAGNOSIS — M85.88 OSTEOPENIA OF SPINE: ICD-10-CM

## 2024-06-13 DIAGNOSIS — K76.0 HEPATIC STEATOSIS: ICD-10-CM

## 2024-06-13 DIAGNOSIS — K21.9 GASTROESOPHAGEAL REFLUX DISEASE, UNSPECIFIED WHETHER ESOPHAGITIS PRESENT: ICD-10-CM

## 2024-06-13 DIAGNOSIS — R10.13 EPIGASTRIC ABDOMINAL PAIN: Primary | ICD-10-CM

## 2024-06-13 ASSESSMENT — ENCOUNTER SYMPTOMS
CONSTIPATION: 0
COUGH: 0
ABDOMINAL PAIN: 1
NAUSEA: 0
VOMITING: 0
SHORTNESS OF BREATH: 0
DIARRHEA: 0
BACK PAIN: 1

## 2024-06-13 NOTE — PROGRESS NOTES
[facility] MEDICINE PRACTICE  [address]  [facility] [address]  Dept: [phone]  Dept Fax: [phone]    SUBJECTIVE     Rajendra Yanez is a 68 y.o.male    Chief Complaint   Patient presents with    Results     Review CT results from 04/23       Chief complaint, [place], and all pertinent details of the case reviewed with the resident.    Please see resident's note for specific details discussed at today's visit.    Patient Active Problem List   Diagnosis    Hyperlipidemia    GERD (gastroesophageal reflux disease)    Chronic allergic rhinitis    Benign non-nodular prostatic hyperplasia with lower urinary tract symptoms    IFG (impaired fasting glucose)    Obstructive sleep apnea on CPAP    Insomnia    Erectile dysfunction    Adjustment disorder with depressed mood    Hiatal hernia       Current Outpatient Medications   Medication Sig Dispense Refill    Misc. Devices (CPAP MACHINE) MISC by Does not apply route Please change CPAP pressure to 12 cm H20.  Download in 2 weeks 1 each 0    tadalafil (CIALIS) 5 MG tablet Take 1 tablet by mouth daily 30 tablet 11    oxyBUTYnin (DITROPAN-XL) 10 MG extended release tablet TAKE 1 TABLET BY MOUTH EVERY DAY 90 tablet 3    atorvastatin (LIPITOR) 10 MG tablet Take 1 tablet by mouth daily 90 tablet 3    aluminum hydroxide-magnesium carbonate (GENATON)  MG/15ML suspension Take 15 mLs by mouth      Doxepin HCl 6 MG TABS Take 1 tablet by mouth daily 30 tablet 2    tamsulosin (FLOMAX) 0.4 MG capsule TAKE 2 CAPSULES BY MOUTH EVERY  capsule 3    hyoscyamine (OSCIMIN) 125 MCG TBDP dispersible tablet DISSOLVE 1 TABLET IN MOUTH 3 TIMES A DAY AS NEEDED FOR ESOPHAGEAL SPASMS      Famotidine-Ca Carb-Mag Hydrox (PEPCID COMPLETE) -165 MG CHEW Take 1 tablet by mouth daily as needed      calcium carbonate (TUMS) 500 MG chewable tablet Take 2 tablets by mouth daily as needed for Heartburn      Simethicone 125 MG TABS       Coenzyme Q10 (COQ-10) 100 
patient instructions.  Discussed use, benefit, and sideeffects of prescribed medications.  All patient questions answered.  Pt voiced understanding. Reviewed health maintenance.  Instructed to continue current medications, diet and exercise.  Patient agreed with treatment plan.Follow up as directed.     Electronically signed by Juani Pittman MD on 6/13/2024 at 3:18 PM

## 2024-06-19 ENCOUNTER — OFFICE VISIT (OUTPATIENT)
Dept: UROLOGY | Age: 69
End: 2024-06-19
Payer: MEDICARE

## 2024-06-19 ENCOUNTER — HOSPITAL ENCOUNTER (OUTPATIENT)
Dept: WOMENS IMAGING | Age: 69
Discharge: HOME | End: 2024-06-19
Payer: MEDICARE

## 2024-06-19 VITALS — HEIGHT: 68 IN | RESPIRATION RATE: 16 BRPM | BODY MASS INDEX: 27.11 KG/M2 | WEIGHT: 178.9 LBS

## 2024-06-19 DIAGNOSIS — Z12.5 PROSTATE CANCER SCREENING: ICD-10-CM

## 2024-06-19 DIAGNOSIS — M85.88 OSTEOPENIA OF SPINE: ICD-10-CM

## 2024-06-19 DIAGNOSIS — R39.15 URINARY URGENCY: ICD-10-CM

## 2024-06-19 DIAGNOSIS — N40.1 BENIGN LOCALIZED PROSTATIC HYPERPLASIA WITH LOWER URINARY TRACT SYMPTOMS (LUTS): Primary | ICD-10-CM

## 2024-06-19 DIAGNOSIS — N52.9 ERECTILE DYSFUNCTION, UNSPECIFIED ERECTILE DYSFUNCTION TYPE: ICD-10-CM

## 2024-06-19 PROCEDURE — G8417 CALC BMI ABV UP PARAM F/U: HCPCS

## 2024-06-19 PROCEDURE — G8427 DOCREV CUR MEDS BY ELIG CLIN: HCPCS

## 2024-06-19 PROCEDURE — 3017F COLORECTAL CA SCREEN DOC REV: CPT

## 2024-06-19 PROCEDURE — 99212 OFFICE O/P EST SF 10 MIN: CPT

## 2024-06-19 PROCEDURE — 1036F TOBACCO NON-USER: CPT

## 2024-06-19 PROCEDURE — 1123F ACP DISCUSS/DSCN MKR DOCD: CPT

## 2024-06-19 PROCEDURE — 77080 DXA BONE DENSITY AXIAL: CPT

## 2024-06-19 RX ORDER — MENTHOL 0 G/G
POWDER TOPICAL AS NEEDED
COMMUNITY

## 2024-06-19 RX ORDER — ESOMEPRAZOLE MAGNESIUM 20 MG/1
20 GRANULE, DELAYED RELEASE ORAL DAILY
COMMUNITY

## 2024-06-19 NOTE — PROGRESS NOTES
[facility] [place] [facility]  [facility]  [address]  [address]  [facility] [address]  Dept: [phone]  Loc: [phone]  Visit Date: 6/19/2024    HPI  Rajendra Yanez is a 68 y.o. male that presents to the urology clinic for PSA and symptom check.    BPH w/ Urinary Urgency  LUTS managed on Flomax 0.4 mg BID and Oxybutynin XL 10 mg. Stable vs previous visit. IPSS of 9/2. No prior TURP.     Erectile Dysfunction  Patient with difficulty spontaneously achieving and maintaining erections. Doing well on Cialis 5 mg daily dosing.     PSA Trend  2.19   (11/08/23)  2.15   (10/07/22)  2.44   (10/09/21)        Last BUN and creatinine:  Lab Results   Component Value Date    BUN 14 04/10/2024     Lab Results   Component Value Date    CREATININE 1.0 04/10/2024           PAST MEDICAL, FAMILY AND SOCIAL HISTORY UPDATE:  Past Medical History:   Diagnosis Date    Allergic     Allergic rhinitis     Arthritis     Back pain     Depression     GERD (gastroesophageal reflux disease)     Hyperlipidemia     Prolonged emergence from general anesthesia     Prostatism     Unspecified sleep apnea     has CPAP     Past Surgical History:   Procedure Laterality Date    CARDIAC CATHETERIZATION      COLONOSCOPY  2011    ESOPHAGEAL MOTILITY STUDY N/A 2/17/2022    ESOPHAGEAL MOTILITY/MANOMETRY STUDY performed by Brigido Mitchell MD at [facility]    ESOPHAGEAL PH MONITORING N/A 2/16/2021    ESOPHAGEAL PH MONITORING (NO EGD) performed by Brigido Mitchell MD at [facility]    EYE SURGERY      GASTRIC FUNDOPLICATION N/A 3/15/2021    ROBOTIC LINX, Hiatal hernia repair, Umbilical hernia repair. performed by Gregg Kim MD at [facility] OR    OTHER SURGICAL HISTORY      tendonitus repair     RETINAL DETACHMENT SURGERY      UPPER GASTROINTESTINAL ENDOSCOPY N/A 1/15/2021    RANDALL performed by Sangeeta Spencer MD at [facility]    UPPER GASTROINTESTINAL ENDOSCOPY  1/15/2021    EGD BIOPSY performed by Sangeeta

## 2024-06-26 ENCOUNTER — OFFICE VISIT (OUTPATIENT)
Dept: PULMONOLOGY | Age: 69
End: 2024-06-26
Payer: MEDICARE

## 2024-06-26 VITALS
TEMPERATURE: 98 F | BODY MASS INDEX: 29.2 KG/M2 | SYSTOLIC BLOOD PRESSURE: 112 MMHG | HEIGHT: 66 IN | WEIGHT: 181.7 LBS | HEART RATE: 66 BPM | DIASTOLIC BLOOD PRESSURE: 70 MMHG | OXYGEN SATURATION: 98 %

## 2024-06-26 DIAGNOSIS — G47.33 OSA ON CPAP: Primary | ICD-10-CM

## 2024-06-26 DIAGNOSIS — G47.09 OTHER INSOMNIA: ICD-10-CM

## 2024-06-26 PROCEDURE — 99214 OFFICE O/P EST MOD 30 MIN: CPT | Performed by: NURSE PRACTITIONER

## 2024-06-26 PROCEDURE — G8427 DOCREV CUR MEDS BY ELIG CLIN: HCPCS | Performed by: NURSE PRACTITIONER

## 2024-06-26 PROCEDURE — G8417 CALC BMI ABV UP PARAM F/U: HCPCS | Performed by: NURSE PRACTITIONER

## 2024-06-26 PROCEDURE — 1036F TOBACCO NON-USER: CPT | Performed by: NURSE PRACTITIONER

## 2024-06-26 PROCEDURE — 1123F ACP DISCUSS/DSCN MKR DOCD: CPT | Performed by: NURSE PRACTITIONER

## 2024-06-26 PROCEDURE — 3017F COLORECTAL CA SCREEN DOC REV: CPT | Performed by: NURSE PRACTITIONER

## 2024-06-26 RX ORDER — TRAZODONE HYDROCHLORIDE 50 MG/1
50 TABLET ORAL NIGHTLY
Qty: 90 TABLET | Refills: 1 | Status: SHIPPED | OUTPATIENT
Start: 2024-06-26

## 2024-06-26 RX ORDER — ESZOPICLONE 1 MG/1
1 TABLET, FILM COATED ORAL NIGHTLY PRN
Qty: 30 TABLET | Refills: 0 | Status: SHIPPED | OUTPATIENT
Start: 2024-06-26 | End: 2024-07-26

## 2024-06-26 ASSESSMENT — ENCOUNTER SYMPTOMS
NAUSEA: 0
VOMITING: 0
DIARRHEA: 0
EYES NEGATIVE: 1
RESPIRATORY NEGATIVE: 1
GASTROINTESTINAL NEGATIVE: 1
ALLERGIC/IMMUNOLOGIC NEGATIVE: 1
CHEST TIGHTNESS: 0
WHEEZING: 0
STRIDOR: 0

## 2024-06-26 NOTE — PROGRESS NOTES
Cardiovascular:  Negative for chest pain and leg swelling.   Gastrointestinal: Negative.  Negative for diarrhea, nausea and vomiting.   Endocrine: Negative.    Genitourinary: Negative.  Negative for dysuria.   Musculoskeletal: Negative.    Skin: Negative.    Allergic/Immunologic: Negative.    Neurological: Negative.    Hematological: Negative.    Psychiatric/Behavioral: Negative.          Physical Exam:    BMI:  Body mass index is 29.33 kg/m².    Wt Readings from Last 3 Encounters:   06/26/24 82.4 kg (181 lb 11.2 oz)   06/19/24 81.1 kg (178 lb 14.4 oz)   06/13/24 81.5 kg (179 lb 9.6 oz)     Weight stable / unchanged  Vitals: /70 (Site: Left Upper Arm, Position: Sitting, Cuff Size: Medium Adult)   Pulse 66   Temp 98 °F (36.7 °C) (Infrared)   Ht 1.676 m (5' 6\")   Wt 82.4 kg (181 lb 11.2 oz)   SpO2 98%   BMI 29.33 kg/m²       Physical Exam  Vitals and nursing note reviewed.   Constitutional:       General: He is not in acute distress.     Appearance: He is well-developed.   HENT:      Head: Normocephalic and atraumatic.   Neck:      Trachea: No tracheal deviation.   Cardiovascular:      Rate and Rhythm: Normal rate and regular rhythm.      Heart sounds: Normal heart sounds. No murmur heard.  Pulmonary:      Effort: Pulmonary effort is normal. No respiratory distress.      Breath sounds: Normal breath sounds. No stridor. No wheezing or rales.   Chest:      Chest wall: No tenderness.   Abdominal:      General: Bowel sounds are normal. There is no distension.      Palpations: Abdomen is soft.   Skin:     General: Skin is warm and dry.      Capillary Refill: Capillary refill takes less than 2 seconds.   Neurological:      Mental Status: He is alert and oriented to person, place, and time.   Psychiatric:         Behavior: Behavior normal.         Thought Content: Thought content normal.         Judgment: Judgment normal.       ASSESSMENT/DIAGNOSIS     Diagnosis Orders   1. SUNIL on CPAP        2. Other insomnia

## 2024-07-29 ENCOUNTER — OFFICE VISIT (OUTPATIENT)
Dept: PULMONOLOGY | Age: 69
End: 2024-07-29
Payer: MEDICARE

## 2024-07-29 VITALS
SYSTOLIC BLOOD PRESSURE: 112 MMHG | TEMPERATURE: 98.7 F | HEART RATE: 68 BPM | WEIGHT: 182.4 LBS | HEIGHT: 67 IN | OXYGEN SATURATION: 97 % | DIASTOLIC BLOOD PRESSURE: 68 MMHG | BODY MASS INDEX: 28.63 KG/M2

## 2024-07-29 DIAGNOSIS — G47.09 OTHER INSOMNIA: ICD-10-CM

## 2024-07-29 DIAGNOSIS — G47.33 OSA ON CPAP: Primary | ICD-10-CM

## 2024-07-29 PROCEDURE — 1123F ACP DISCUSS/DSCN MKR DOCD: CPT | Performed by: NURSE PRACTITIONER

## 2024-07-29 PROCEDURE — G8417 CALC BMI ABV UP PARAM F/U: HCPCS | Performed by: NURSE PRACTITIONER

## 2024-07-29 PROCEDURE — G8427 DOCREV CUR MEDS BY ELIG CLIN: HCPCS | Performed by: NURSE PRACTITIONER

## 2024-07-29 PROCEDURE — 1036F TOBACCO NON-USER: CPT | Performed by: NURSE PRACTITIONER

## 2024-07-29 PROCEDURE — 99213 OFFICE O/P EST LOW 20 MIN: CPT | Performed by: NURSE PRACTITIONER

## 2024-07-29 PROCEDURE — 3017F COLORECTAL CA SCREEN DOC REV: CPT | Performed by: NURSE PRACTITIONER

## 2024-07-29 RX ORDER — ESZOPICLONE 1 MG/1
1 TABLET, FILM COATED ORAL NIGHTLY PRN
Qty: 90 TABLET | Refills: 0 | Status: SHIPPED | OUTPATIENT
Start: 2024-07-29 | End: 2024-10-27

## 2024-07-29 ASSESSMENT — ENCOUNTER SYMPTOMS
WHEEZING: 0
GASTROINTESTINAL NEGATIVE: 1
STRIDOR: 0
VOMITING: 0
CHEST TIGHTNESS: 0
DIARRHEA: 0
RESPIRATORY NEGATIVE: 1
NAUSEA: 0
ALLERGIC/IMMUNOLOGIC NEGATIVE: 1
EYES NEGATIVE: 1

## 2024-07-29 NOTE — PROGRESS NOTES
[facility]      Rajendra Yanez         [id number]  7/29/2024   Chief Complaint   Patient presents with    Follow-up     SUNIL 1 month follow up with med check         Pt of Lilibeth Casper     PAP Download:   Original or initial AHI: 28.4     Date of initial study: 10/03/13      Compliant  77%  Noncompliant 23 %     PAP Type CPAP Level  12   Avg Hrs/Day 4 hours 36 minutes  AHI: 0.7   Recorded compliance dates , 06/25/24  to 07/24/24   Machine/Mfg:   [x] ResMed    [] Respironics/Dreamstation   Interface:   [x] Nasal    [] Nasal pillows   [] FFM      Provider:      [x] [facility]     [][employer]     [] [employer]    [] [employer]    [] [facility]               [] [facility]      [] [employer]    [] [place]:      [] Other    Neck Size: 15.25  Mallampati Mallampati 1  ESS:  10  SAQLI: 87    Here is a scan of the most recent download:            Presentation:   Rajendra presents for sleep medicine follow up for obstructive sleep apnea, insomnia  Since the last visit, Rajendra has been compliant with his PAP therapy. Patient taking Lunesta nightly PRN  We recently tried Trazodone use at night and patient quit taking stating it made him \"swell up\" ???  AHI is well controlled   BMI 28  Awake and alert     Equipment issues:  The pressure is  acceptable, the mask is acceptable     Sleep issues:  Do you feel better? Yes  More rested?Yes   Better concentration? NA    Progress History:   Since last visit any new medical issues? No  New ER or hospital visits? No      Review of Systems -   Review of Systems   Constitutional: Negative.  Negative for chills, fever and unexpected weight change.   HENT: Negative.     Eyes: Negative.    Respiratory: Negative.  Negative for chest tightness, wheezing and stridor.    Cardiovascular:  Negative for chest pain and leg swelling.   Gastrointestinal: Negative.  Negative for diarrhea, nausea and vomiting.   Endocrine: Negative.    Genitourinary: Negative.  Negative for 
encounter.          Patient verbalizes understanding.  We will see Rajendra Yanez back in: {NUMBERS 1-12:10} {DAY, DAYS, WEEK, WEEKS, MONTH, MONTHS, YEAR, YEARS:00031} with download    Information added by my medical assistant/LPN was reviewed today    {ejsbillin}    ROSMERY Leung-CNP   2024

## 2024-08-11 DIAGNOSIS — N40.1 BENIGN NON-NODULAR PROSTATIC HYPERPLASIA WITH LOWER URINARY TRACT SYMPTOMS: ICD-10-CM

## 2024-08-12 NOTE — TELEPHONE ENCOUNTER
Patient's last appointment was : Visit date not found  Patient's next appointment is : Visit date not found   Last refilled on: 08/02/2023  Which pharmacy does the script need sent to: [facility] [address]      Lab Results   Component Value Date    LABA1C 5.6 11/08/2023     Lab Results   Component Value Date    CHOL 121 11/08/2023    TRIG 103 11/08/2023    HDL 47 11/08/2023    LDLDIRECT 91 04/09/2013     Lab Results   Component Value Date     12/01/2023    K 4.2 12/01/2023     12/01/2023    CO2 30 12/01/2023    BUN 14 04/10/2024    CREATININE 1.0 04/10/2024    GLUCOSE 106 12/01/2023    CALCIUM 9.2 12/01/2023    BILITOT 1.1 12/01/2023    ALKPHOS 75 12/01/2023    AST 16 12/01/2023    ALT 20 12/01/2023    LABGLOM 81 04/10/2024     Lab Results   Component Value Date    TSH 2.150 11/08/2023    T4FREE 1.04 11/08/2023     Lab Results   Component Value Date    WBC 5.2 12/01/2023    HGB 14.5 12/01/2023    HCT 44.0 12/01/2023    MCV 93.0 12/01/2023     12/01/2023

## 2024-08-14 ENCOUNTER — TELEPHONE (OUTPATIENT)
Dept: PULMONOLOGY | Age: 69
End: 2024-08-14

## 2024-08-14 RX ORDER — TAMSULOSIN HYDROCHLORIDE 0.4 MG/1
CAPSULE ORAL
Qty: 180 CAPSULE | Refills: 1 | Status: SHIPPED | OUTPATIENT
Start: 2024-08-14

## 2024-12-06 DIAGNOSIS — E78.5 HYPERLIPIDEMIA, UNSPECIFIED HYPERLIPIDEMIA TYPE: ICD-10-CM

## 2024-12-06 RX ORDER — ATORVASTATIN CALCIUM 10 MG/1
10 TABLET, FILM COATED ORAL DAILY
Qty: 90 TABLET | Refills: 3 | Status: SHIPPED | OUTPATIENT
Start: 2024-12-06

## 2024-12-06 NOTE — TELEPHONE ENCOUNTER
Patient's last appointment was: 6/13/2024  with our   Patient's next appointment is: 12/12/2024  with our Dr. Jin  Last refilled on: 9/9/2024   Which pharmacy does the script need sent to:   [id number]          Lab Results   Component Value Date    LABA1C 5.6 11/08/2023     Lab Results   Component Value Date    CHOL 121 11/08/2023    TRIG 103 11/08/2023    HDL 47 11/08/2023    LDLDIRECT 91 04/09/2013     Lab Results   Component Value Date     12/01/2023    K 4.2 12/01/2023     12/01/2023    CO2 30 12/01/2023    BUN 14 04/10/2024    CREATININE 1.0 04/10/2024    GLUCOSE 106 12/01/2023    CALCIUM 9.2 12/01/2023    BILITOT 1.1 12/01/2023    ALKPHOS 75 12/01/2023    AST 16 12/01/2023    ALT 20 12/01/2023    LABGLOM 81 04/10/2024     Lab Results   Component Value Date    TSH 2.150 11/08/2023    T4FREE 1.04 11/08/2023     Lab Results   Component Value Date    WBC 5.2 12/01/2023    HGB 14.5 12/01/2023    HCT 44.0 12/01/2023    MCV 93.0 12/01/2023     12/01/2023

## 2024-12-09 SDOH — HEALTH STABILITY: PHYSICAL HEALTH: ON AVERAGE, HOW MANY MINUTES DO YOU ENGAGE IN EXERCISE AT THIS LEVEL?: 20 MIN

## 2024-12-09 SDOH — HEALTH STABILITY: PHYSICAL HEALTH: ON AVERAGE, HOW MANY DAYS PER WEEK DO YOU ENGAGE IN MODERATE TO STRENUOUS EXERCISE (LIKE A BRISK WALK)?: 2 DAYS

## 2024-12-09 ASSESSMENT — LIFESTYLE VARIABLES
HOW OFTEN DURING THE LAST YEAR HAVE YOU NEEDED AN ALCOHOLIC DRINK FIRST THING IN THE MORNING TO GET YOURSELF GOING AFTER A NIGHT OF HEAVY DRINKING: NEVER
HOW OFTEN DO YOU HAVE A DRINK CONTAINING ALCOHOL: 2
HOW OFTEN DURING THE LAST YEAR HAVE YOU BEEN UNABLE TO REMEMBER WHAT HAPPENED THE NIGHT BEFORE BECAUSE YOU HAD BEEN DRINKING: NEVER
HOW OFTEN DURING THE LAST YEAR HAVE YOU NEEDED AN ALCOHOLIC DRINK FIRST THING IN THE MORNING TO GET YOURSELF GOING AFTER A NIGHT OF HEAVY DRINKING: NEVER
HOW OFTEN DURING THE LAST YEAR HAVE YOU BEEN UNABLE TO REMEMBER WHAT HAPPENED THE NIGHT BEFORE BECAUSE YOU HAD BEEN DRINKING: NEVER
HOW MANY STANDARD DRINKS CONTAINING ALCOHOL DO YOU HAVE ON A TYPICAL DAY: 3 OR 4
HOW OFTEN DURING THE LAST YEAR HAVE YOU FAILED TO DO WHAT WAS NORMALLY EXPECTED FROM YOU BECAUSE OF DRINKING: NEVER
HOW OFTEN DURING THE LAST YEAR HAVE YOU FOUND THAT YOU WERE NOT ABLE TO STOP DRINKING ONCE YOU HAD STARTED: NEVER
HAVE YOU OR SOMEONE ELSE BEEN INJURED AS A RESULT OF YOUR DRINKING: NO
HOW OFTEN DURING THE LAST YEAR HAVE YOU HAD A FEELING OF GUILT OR REMORSE AFTER DRINKING: NEVER
HOW OFTEN DO YOU HAVE A DRINK CONTAINING ALCOHOL: MONTHLY OR LESS
HOW OFTEN DURING THE LAST YEAR HAVE YOU HAD A FEELING OF GUILT OR REMORSE AFTER DRINKING: NEVER
HAS A RELATIVE, FRIEND, DOCTOR, OR ANOTHER HEALTH PROFESSIONAL EXPRESSED CONCERN ABOUT YOUR DRINKING OR SUGGESTED YOU CUT DOWN: NO
HOW OFTEN DURING THE LAST YEAR HAVE YOU FOUND THAT YOU WERE NOT ABLE TO STOP DRINKING ONCE YOU HAD STARTED: NEVER
HOW OFTEN DURING THE LAST YEAR HAVE YOU FAILED TO DO WHAT WAS NORMALLY EXPECTED FROM YOU BECAUSE OF DRINKING: NEVER
HAVE YOU OR SOMEONE ELSE BEEN INJURED AS A RESULT OF YOUR DRINKING: NO
HAS A RELATIVE, FRIEND, DOCTOR, OR ANOTHER HEALTH PROFESSIONAL EXPRESSED CONCERN ABOUT YOUR DRINKING OR SUGGESTED YOU CUT DOWN: NO
HOW OFTEN DO YOU HAVE SIX OR MORE DRINKS ON ONE OCCASION: 1
HOW MANY STANDARD DRINKS CONTAINING ALCOHOL DO YOU HAVE ON A TYPICAL DAY: 2

## 2024-12-09 ASSESSMENT — PATIENT HEALTH QUESTIONNAIRE - PHQ9
SUM OF ALL RESPONSES TO PHQ QUESTIONS 1-9: 0
1. LITTLE INTEREST OR PLEASURE IN DOING THINGS: NOT AT ALL
2. FEELING DOWN, DEPRESSED OR HOPELESS: NOT AT ALL
SUM OF ALL RESPONSES TO PHQ9 QUESTIONS 1 & 2: 0
SUM OF ALL RESPONSES TO PHQ QUESTIONS 1-9: 0

## 2024-12-12 ENCOUNTER — LAB (OUTPATIENT)
Dept: LAB | Age: 69
End: 2024-12-12

## 2024-12-12 ENCOUNTER — OFFICE VISIT (OUTPATIENT)
Dept: FAMILY MEDICINE CLINIC | Age: 69
End: 2024-12-12

## 2024-12-12 VITALS
DIASTOLIC BLOOD PRESSURE: 78 MMHG | WEIGHT: 182.2 LBS | OXYGEN SATURATION: 96 % | RESPIRATION RATE: 16 BRPM | HEIGHT: 67 IN | TEMPERATURE: 98.3 F | SYSTOLIC BLOOD PRESSURE: 118 MMHG | BODY MASS INDEX: 28.6 KG/M2 | HEART RATE: 65 BPM

## 2024-12-12 DIAGNOSIS — K21.9 GASTROESOPHAGEAL REFLUX DISEASE, UNSPECIFIED WHETHER ESOPHAGITIS PRESENT: ICD-10-CM

## 2024-12-12 DIAGNOSIS — Z23 NEED FOR COVID-19 VACCINE: ICD-10-CM

## 2024-12-12 DIAGNOSIS — F43.21 ADJUSTMENT DISORDER WITH DEPRESSED MOOD: ICD-10-CM

## 2024-12-12 DIAGNOSIS — N52.9 ERECTILE DYSFUNCTION, UNSPECIFIED ERECTILE DYSFUNCTION TYPE: ICD-10-CM

## 2024-12-12 DIAGNOSIS — J30.9 CHRONIC ALLERGIC RHINITIS: ICD-10-CM

## 2024-12-12 DIAGNOSIS — E78.5 HYPERLIPIDEMIA, UNSPECIFIED HYPERLIPIDEMIA TYPE: ICD-10-CM

## 2024-12-12 DIAGNOSIS — N40.1 BENIGN NON-NODULAR PROSTATIC HYPERPLASIA WITH LOWER URINARY TRACT SYMPTOMS: ICD-10-CM

## 2024-12-12 DIAGNOSIS — K76.0 HEPATIC STEATOSIS: ICD-10-CM

## 2024-12-12 DIAGNOSIS — G47.33 OBSTRUCTIVE SLEEP APNEA ON CPAP: ICD-10-CM

## 2024-12-12 DIAGNOSIS — Z00.00 MEDICARE ANNUAL WELLNESS VISIT, SUBSEQUENT: Primary | ICD-10-CM

## 2024-12-12 DIAGNOSIS — R73.01 IFG (IMPAIRED FASTING GLUCOSE): ICD-10-CM

## 2024-12-12 LAB
ALBUMIN SERPL BCG-MCNC: 4.2 G/DL (ref 3.5–5.1)
ALP SERPL-CCNC: 87 U/L (ref 38–126)
ALT SERPL W/O P-5'-P-CCNC: 16 U/L (ref 11–66)
ANION GAP SERPL CALC-SCNC: 9 MEQ/L (ref 8–16)
AST SERPL-CCNC: 20 U/L (ref 5–40)
BASOPHILS ABSOLUTE: 0 THOU/MM3 (ref 0–0.1)
BASOPHILS NFR BLD AUTO: 0.6 %
BILIRUB SERPL-MCNC: 1.1 MG/DL (ref 0.3–1.2)
BUN SERPL-MCNC: 15 MG/DL (ref 7–22)
CALCIUM SERPL-MCNC: 9.3 MG/DL (ref 8.5–10.5)
CHLORIDE SERPL-SCNC: 102 MEQ/L (ref 98–111)
CHOLEST SERPL-MCNC: 124 MG/DL (ref 100–199)
CO2 SERPL-SCNC: 28 MEQ/L (ref 23–33)
CREAT SERPL-MCNC: 0.9 MG/DL (ref 0.4–1.2)
CREAT UR-MCNC: 112.1 MG/DL
DEPRECATED MEAN GLUCOSE BLD GHB EST-ACNC: 102 MG/DL (ref 70–126)
DEPRECATED RDW RBC AUTO: 45.1 FL (ref 35–45)
EOSINOPHIL NFR BLD AUTO: 1 %
EOSINOPHILS ABSOLUTE: 0.1 THOU/MM3 (ref 0–0.4)
ERYTHROCYTE [DISTWIDTH] IN BLOOD BY AUTOMATED COUNT: 13 % (ref 11.5–14.5)
FOLATE SERPL-MCNC: > 20 NG/ML (ref 4.8–24.2)
GFR SERPL CREATININE-BSD FRML MDRD: > 90 ML/MIN/1.73M2
GLUCOSE SERPL-MCNC: 108 MG/DL (ref 70–108)
HBA1C MFR BLD HPLC: 5.4 % (ref 4.4–6.4)
HCT VFR BLD AUTO: 44.8 % (ref 42–52)
HDLC SERPL-MCNC: 55 MG/DL
HGB BLD-MCNC: 14.9 GM/DL (ref 14–18)
IMM GRANULOCYTES # BLD AUTO: 0.01 THOU/MM3 (ref 0–0.07)
IMM GRANULOCYTES NFR BLD AUTO: 0.2 %
LDLC SERPL CALC-MCNC: 52 MG/DL
LYMPHOCYTES ABSOLUTE: 1.2 THOU/MM3 (ref 1–4.8)
LYMPHOCYTES NFR BLD AUTO: 24.1 %
MAGNESIUM SERPL-MCNC: 2.1 MG/DL (ref 1.6–2.4)
MCH RBC QN AUTO: 31.5 PG (ref 26–33)
MCHC RBC AUTO-ENTMCNC: 33.3 GM/DL (ref 32.2–35.5)
MCV RBC AUTO: 94.7 FL (ref 80–94)
MICROALBUMIN UR-MCNC: < 1.2 MG/DL
MICROALBUMIN/CREAT RATIO PNL UR: 11 MG/G (ref 0–30)
MONOCYTES ABSOLUTE: 0.4 THOU/MM3 (ref 0.4–1.3)
MONOCYTES NFR BLD AUTO: 8.8 %
NEUTROPHILS ABSOLUTE: 3.3 THOU/MM3 (ref 1.8–7.7)
NEUTROPHILS NFR BLD AUTO: 65.3 %
NRBC BLD AUTO-RTO: 0 /100 WBC
PLATELET # BLD AUTO: 169 THOU/MM3 (ref 130–400)
PMV BLD AUTO: 11.7 FL (ref 9.4–12.4)
POTASSIUM SERPL-SCNC: 4.3 MEQ/L (ref 3.5–5.2)
PROT SERPL-MCNC: 6.5 G/DL (ref 6.1–8)
RBC # BLD AUTO: 4.73 MILL/MM3 (ref 4.7–6.1)
SODIUM SERPL-SCNC: 139 MEQ/L (ref 135–145)
TRIGL SERPL-MCNC: 86 MG/DL (ref 0–199)
TSH SERPL DL<=0.005 MIU/L-ACNC: 2.73 UIU/ML (ref 0.4–4.2)
VIT B12 SERPL-MCNC: 705 PG/ML (ref 211–911)
WBC # BLD AUTO: 5 THOU/MM3 (ref 4.8–10.8)

## 2024-12-12 RX ORDER — ESZOPICLONE 1 MG/1
TABLET, FILM COATED ORAL
COMMUNITY
Start: 2024-06-28

## 2024-12-12 RX ORDER — PHENOL 1.4 %
AEROSOL, SPRAY (ML) MUCOUS MEMBRANE
COMMUNITY
Start: 2019-01-01

## 2024-12-12 RX ORDER — POLYETHYLENE GLYCOL 3350 17 G/17G
17 POWDER, FOR SOLUTION ORAL DAILY PRN
COMMUNITY

## 2024-12-12 ASSESSMENT — ENCOUNTER SYMPTOMS
NAUSEA: 0
CHEST TIGHTNESS: 0
ANAL BLEEDING: 0
DIARRHEA: 0
SHORTNESS OF BREATH: 0
BLOOD IN STOOL: 0
CONSTIPATION: 0
VOMITING: 0
ABDOMINAL PAIN: 0

## 2024-12-12 NOTE — PROGRESS NOTES
providers/suppliers regularly involved in providing care):   Patient Care Team:  Shailesh Jin MD as PCP - General (Family Medicine)  Shailesh Jin MD as PCP - Empaneled Provider      Reviewed and updated this visit:  Tobacco  Allergies  Meds  Problems  Med Hx  Surg Hx  Soc Hx  Fam Hx              
screen  Discontinued    Prostate Specific Antigen (PSA) Screening or Monitoring  Discontinued     Tobacco Use: Low Risk  (12/12/2024)    Patient History     Smoking Tobacco Use: Never     Smokeless Tobacco Use: Never     Passive Exposure: Not on file     (updated 12/12/2024)   AAA ultrasound (Male, 65-75, smoked ever) indicated at this time?  No tobacco history  CT Lung Screen (50-80, 20 pk-yrs, smoking or quit <15 years) indicated at this time?  No tobacco history  Sleep Medicine referral indicated at this time (Obesity, Snoring, Daytime Somnolence, Apneic Episodes)?  Known SUNIL on CPAP     Future Appointments   Date Time Provider Department Center   1/15/2025  1:15 PM Ariel Marsh PA-C [facility]   7/28/2025 11:00 AM Ade Bowden, APRN - CNP N Pulm Med [facility]   1/15/2026  8:30 AM Shailesh Jin MD SRPX [facility] DEP       ASSESSMENT       Diagnosis Orders   1. Medicare annual wellness visit, subsequent        2. IFG (impaired fasting glucose)  Hemoglobin A1C    Comprehensive Metabolic Panel    Microalbumin / Creatinine Urine Ratio    TSH with Reflex    CBC with Auto Differential      3. Hyperlipidemia, unspecified hyperlipidemia type  Lipid Panel    Comprehensive Metabolic Panel    TSH with Reflex    CBC with Auto Differential      4. Obstructive sleep apnea on CPAP  TSH with Reflex    CBC with Auto Differential      5. Adjustment disorder with depressed mood  TSH with Reflex    CBC with Auto Differential      6. Benign non-nodular prostatic hyperplasia with lower urinary tract symptoms  TSH with Reflex    CBC with Auto Differential      7. Gastroesophageal reflux disease, unspecified whether esophagitis present  TSH with Reflex    CBC with Auto Differential    Vitamin B12 & Folate    Magnesium      8. Chronic allergic rhinitis  TSH with Reflex    CBC with Auto Differential      9. Erectile dysfunction, unspecified erectile dysfunction type  TSH with Reflex    CBC with Auto

## 2024-12-12 NOTE — PATIENT INSTRUCTIONS
problems.  Where can you learn more?  Go to [email or web address].net/patientEd and enter F075 to learn more about \"A Healthy Heart: Care Instructions.\"  Current as of: June 24, 2023  Content Version: 14.2  © 2024 [employer].   Care instructions adapted under license by [employer]. If you have questions about a medical condition or this instruction, always ask your healthcare professional. [employer] disclaims any warranty or liability for your use of this information.      Personalized Preventive Plan for Rajendra Yanez - 12/12/2024  Medicare offers a range of preventive health benefits. Some of the tests and screenings are paid in full while other may be subject to a deductible, co-insurance, and/or copay.    Some of these benefits include a comprehensive review of your medical history including lifestyle, illnesses that may run in your family, and various assessments and screenings as appropriate.    After reviewing your medical record and screening and assessments performed today your provider may have ordered immunizations, labs, imaging, and/or referrals for you.  A list of these orders (if applicable) as well as your Preventive Care list are included within your After Visit Summary for your review.    Other Preventive Recommendations:    A preventive eye exam performed by an eye specialist is recommended every 1-2 years to screen for glaucoma; cataracts, macular degeneration, and other eye disorders.  A preventive dental visit is recommended every 6 months.  Try to get at least 150 minutes of exercise per week or 10,000 steps per day on a pedometer .  Order or download the FREE \"Exercise & Physical Activity: Your Everyday Guide\" from [employer] [place] [employer]. Call 1-997.127.9789 or search [employer] [place] [employer] online.  You need 0044-2545 mg of calcium and 6209-8394 IU of vitamin D per day. It is possible to meet your calcium requirement with diet alone, but a 
done

## 2024-12-13 ENCOUNTER — TELEPHONE (OUTPATIENT)
Dept: FAMILY MEDICINE CLINIC | Age: 69
End: 2024-12-13

## 2024-12-13 NOTE — TELEPHONE ENCOUNTER
----- Message from Dr. Shailesh Jin MD sent at 12/12/2024  5:17 PM EST -----  Notify pt-   Results reviewed.   Spot MA okay  B12/folate okay  HG A1c okay at 5.4  TSH okay  FLP okay-continue current dose of Lipitor  CMP okay  Magnesium okay  CBC okay overall with mildly elevated MCV-recommend daily B complex multivitamin, if patient is not already taking.  Thanks.  ES  
Left patient detailed message. Okay per ROSA MARIAA  
negative

## 2025-01-06 ENCOUNTER — LAB (OUTPATIENT)
Dept: LAB | Age: 70
End: 2025-01-06

## 2025-01-06 DIAGNOSIS — N40.1 BENIGN LOCALIZED PROSTATIC HYPERPLASIA WITH LOWER URINARY TRACT SYMPTOMS (LUTS): ICD-10-CM

## 2025-01-06 LAB — PSA SERPL-MCNC: 2.24 NG/ML (ref 0–1)

## 2025-01-15 ENCOUNTER — OFFICE VISIT (OUTPATIENT)
Dept: UROLOGY | Age: 70
End: 2025-01-15
Payer: MEDICARE

## 2025-01-15 VITALS
WEIGHT: 182 LBS | SYSTOLIC BLOOD PRESSURE: 108 MMHG | DIASTOLIC BLOOD PRESSURE: 64 MMHG | BODY MASS INDEX: 28.56 KG/M2 | HEIGHT: 67 IN

## 2025-01-15 DIAGNOSIS — N40.1 BENIGN LOCALIZED PROSTATIC HYPERPLASIA WITH LOWER URINARY TRACT SYMPTOMS (LUTS): Primary | ICD-10-CM

## 2025-01-15 DIAGNOSIS — N40.1 BENIGN NON-NODULAR PROSTATIC HYPERPLASIA WITH LOWER URINARY TRACT SYMPTOMS: ICD-10-CM

## 2025-01-15 DIAGNOSIS — R39.15 URINARY URGENCY: ICD-10-CM

## 2025-01-15 DIAGNOSIS — N52.9 ERECTILE DYSFUNCTION, UNSPECIFIED ERECTILE DYSFUNCTION TYPE: ICD-10-CM

## 2025-01-15 DIAGNOSIS — Z12.5 PROSTATE CANCER SCREENING: ICD-10-CM

## 2025-01-15 PROCEDURE — G8417 CALC BMI ABV UP PARAM F/U: HCPCS

## 2025-01-15 PROCEDURE — 99214 OFFICE O/P EST MOD 30 MIN: CPT

## 2025-01-15 PROCEDURE — G8427 DOCREV CUR MEDS BY ELIG CLIN: HCPCS

## 2025-01-15 PROCEDURE — 3017F COLORECTAL CA SCREEN DOC REV: CPT

## 2025-01-15 PROCEDURE — 1159F MED LIST DOCD IN RCRD: CPT

## 2025-01-15 PROCEDURE — 1036F TOBACCO NON-USER: CPT

## 2025-01-15 PROCEDURE — 1123F ACP DISCUSS/DSCN MKR DOCD: CPT

## 2025-01-15 RX ORDER — OXYBUTYNIN CHLORIDE 10 MG/1
10 TABLET, EXTENDED RELEASE ORAL DAILY
Qty: 90 TABLET | Refills: 3 | Status: SHIPPED | OUTPATIENT
Start: 2025-01-15

## 2025-01-15 RX ORDER — TADALAFIL 5 MG/1
5 TABLET ORAL DAILY
Qty: 90 TABLET | Refills: 3 | Status: SHIPPED | OUTPATIENT
Start: 2025-01-15

## 2025-01-15 RX ORDER — TAMSULOSIN HYDROCHLORIDE 0.4 MG/1
0.8 CAPSULE ORAL DAILY
Qty: 180 CAPSULE | Refills: 3 | Status: SHIPPED | OUTPATIENT
Start: 2025-01-15

## 2025-01-15 NOTE — PROGRESS NOTES
[facility] [place] [facility]  [facility]  [address]  [address]  [facility] [address]  Dept: [phone]  Loc: [phone]  Visit Date: 1/15/2025    HPI  Rajendra Yanez is a 69 y.o. male that presents to the urology clinic for PSA and symptom check.    BPH w/ Urinary Urgency  LUTS managed on Flomax 0.4 mg BID and Oxybutynin XL 10 mg. Stable vs previous visit. IPSS of 9/2. No prior TURP.     Erectile Dysfunction  Patient with difficulty spontaneously achieving and maintaining erections. Doing well on Cialis 5 mg daily dosing.     PSA Trend  2.24   (01/06/25)  2.19   (11/08/23)  2.15   (10/07/22)  2.44   (10/09/21)      Last BUN and creatinine:  Lab Results   Component Value Date    BUN 15 12/12/2024     Lab Results   Component Value Date    CREATININE 0.9 12/12/2024           PAST MEDICAL, FAMILY AND SOCIAL HISTORY UPDATE:  Past Medical History:   Diagnosis Date    Allergic     Allergic rhinitis     Arthritis     Back pain     Depression     GERD (gastroesophageal reflux disease)     Hyperlipidemia     Osteopenia     Prolonged emergence from general anesthesia     Prostatism     Unspecified sleep apnea     has CPAP     Past Surgical History:   Procedure Laterality Date    CARDIAC CATHETERIZATION      COLONOSCOPY  2011    ESOPHAGEAL MOTILITY STUDY N/A 2/17/2022    ESOPHAGEAL MOTILITY/MANOMETRY STUDY performed by Brigido Mitchell MD at [facility]    ESOPHAGEAL PH MONITORING N/A 2/16/2021    ESOPHAGEAL PH MONITORING (NO EGD) performed by Brigido Mitchell MD at [facility]    EYE SURGERY      GASTRIC FUNDOPLICATION N/A 3/15/2021    ROBOTIC LINX, Hiatal hernia repair, Umbilical hernia repair. performed by Gregg Kim MD at [facility] OR    OTHER SURGICAL HISTORY      tendonitus repair     RETINAL DETACHMENT SURGERY      UPPER GASTROINTESTINAL ENDOSCOPY N/A 1/15/2021    RANDALL performed by Sangeeta Spencer MD at [facility]    UPPER GASTROINTESTINAL ENDOSCOPY  1/15/2021    EGD

## 2025-03-03 RX ORDER — ACETAMINOPHEN 160 MG
2000 TABLET,DISINTEGRATING ORAL DAILY
COMMUNITY

## 2025-03-03 RX ORDER — VITAMIN B COMPLEX
1 CAPSULE ORAL DAILY
COMMUNITY

## 2025-03-03 NOTE — PROGRESS NOTES
NPO after midnight (may have 8 oz of water up to 2 hours before surgery)  Bring insurance info and drivers license  Wear comfortable clean clothing  Do not bring jewelry  Shower night before and morning of surgery with a liquid antibacterial soap  Bring list of medications with dosage and how often taken  Follow all instructions given by your physician   needed at discharge  You must have a responsible adult with you day of surgery and for 24 hours after surgery  Call [phone] for any questions

## 2025-03-07 ENCOUNTER — ANESTHESIA (OUTPATIENT)
Dept: OPERATING ROOM | Age: 70
End: 2025-03-07
Payer: MEDICARE

## 2025-03-07 ENCOUNTER — HOSPITAL ENCOUNTER (OUTPATIENT)
Age: 70
Setting detail: OUTPATIENT SURGERY
Discharge: HOME | End: 2025-03-07
Attending: SPECIALIST | Admitting: SPECIALIST
Payer: MEDICARE

## 2025-03-07 ENCOUNTER — ANESTHESIA EVENT (OUTPATIENT)
Dept: OPERATING ROOM | Age: 70
End: 2025-03-07
Payer: MEDICARE

## 2025-03-07 VITALS
HEART RATE: 70 BPM | OXYGEN SATURATION: 95 % | WEIGHT: 180 LBS | DIASTOLIC BLOOD PRESSURE: 60 MMHG | TEMPERATURE: 96.9 F | BODY MASS INDEX: 27.28 KG/M2 | SYSTOLIC BLOOD PRESSURE: 121 MMHG | HEIGHT: 68 IN | RESPIRATION RATE: 16 BRPM

## 2025-03-07 DIAGNOSIS — D03.62 MELANOMA IN SITU OF LEFT UPPER ARM (HCC): ICD-10-CM

## 2025-03-07 PROCEDURE — 3700000001 HC ADD 15 MINUTES (ANESTHESIA): Performed by: SPECIALIST

## 2025-03-07 PROCEDURE — 2500000003 HC RX 250 WO HCPCS: Performed by: SPECIALIST

## 2025-03-07 PROCEDURE — 6360000002 HC RX W HCPCS: Performed by: SPECIALIST

## 2025-03-07 PROCEDURE — 7100000010 HC PHASE II RECOVERY - FIRST 15 MIN: Performed by: SPECIALIST

## 2025-03-07 PROCEDURE — 88305 TISSUE EXAM BY PATHOLOGIST: CPT

## 2025-03-07 PROCEDURE — 3600000012 HC SURGERY LEVEL 2 ADDTL 15MIN: Performed by: SPECIALIST

## 2025-03-07 PROCEDURE — 7100000011 HC PHASE II RECOVERY - ADDTL 15 MIN: Performed by: SPECIALIST

## 2025-03-07 PROCEDURE — 2580000003 HC RX 258: Performed by: NURSE ANESTHETIST, CERTIFIED REGISTERED

## 2025-03-07 PROCEDURE — 2709999900 HC NON-CHARGEABLE SUPPLY: Performed by: SPECIALIST

## 2025-03-07 PROCEDURE — 3700000000 HC ANESTHESIA ATTENDED CARE: Performed by: SPECIALIST

## 2025-03-07 PROCEDURE — 6360000002 HC RX W HCPCS: Performed by: NURSE ANESTHETIST, CERTIFIED REGISTERED

## 2025-03-07 PROCEDURE — 3600000002 HC SURGERY LEVEL 2 BASE: Performed by: SPECIALIST

## 2025-03-07 RX ORDER — SODIUM CHLORIDE 9 MG/ML
INJECTION, SOLUTION INTRAVENOUS
Status: DISCONTINUED | OUTPATIENT
Start: 2025-03-07 | End: 2025-03-07 | Stop reason: SDUPTHER

## 2025-03-07 RX ORDER — LIDOCAINE HYDROCHLORIDE 20 MG/ML
INJECTION, SOLUTION INTRAVENOUS
Status: DISCONTINUED | OUTPATIENT
Start: 2025-03-07 | End: 2025-03-07 | Stop reason: SDUPTHER

## 2025-03-07 RX ORDER — GINSENG 100 MG
CAPSULE ORAL PRN
Status: DISCONTINUED | OUTPATIENT
Start: 2025-03-07 | End: 2025-03-07 | Stop reason: ALTCHOICE

## 2025-03-07 RX ORDER — FENTANYL CITRATE 50 UG/ML
INJECTION, SOLUTION INTRAMUSCULAR; INTRAVENOUS
Status: DISCONTINUED | OUTPATIENT
Start: 2025-03-07 | End: 2025-03-07 | Stop reason: SDUPTHER

## 2025-03-07 RX ORDER — MIDAZOLAM HYDROCHLORIDE 1 MG/ML
INJECTION, SOLUTION INTRAMUSCULAR; INTRAVENOUS
Status: DISCONTINUED | OUTPATIENT
Start: 2025-03-07 | End: 2025-03-07 | Stop reason: SDUPTHER

## 2025-03-07 RX ORDER — LIDOCAINE HYDROCHLORIDE AND EPINEPHRINE 10; 10 MG/ML; UG/ML
INJECTION, SOLUTION INFILTRATION; PERINEURAL PRN
Status: DISCONTINUED | OUTPATIENT
Start: 2025-03-07 | End: 2025-03-07 | Stop reason: ALTCHOICE

## 2025-03-07 RX ORDER — PROPOFOL 10 MG/ML
INJECTION, EMULSION INTRAVENOUS
Status: DISCONTINUED | OUTPATIENT
Start: 2025-03-07 | End: 2025-03-07 | Stop reason: SDUPTHER

## 2025-03-07 RX ADMIN — MIDAZOLAM 1 MG: 1 INJECTION INTRAMUSCULAR; INTRAVENOUS at 10:19

## 2025-03-07 RX ADMIN — SODIUM CHLORIDE: 9 INJECTION, SOLUTION INTRAVENOUS at 09:40

## 2025-03-07 RX ADMIN — PROPOFOL 25 MG: 10 INJECTION, EMULSION INTRAVENOUS at 09:38

## 2025-03-07 RX ADMIN — FENTANYL CITRATE 50 MCG: 50 INJECTION, SOLUTION INTRAMUSCULAR; INTRAVENOUS at 09:38

## 2025-03-07 RX ADMIN — WATER 2000 MG: 1 INJECTION INTRAMUSCULAR; INTRAVENOUS; SUBCUTANEOUS at 09:42

## 2025-03-07 RX ADMIN — LIDOCAINE HYDROCHLORIDE 100 MG: 20 INJECTION, SOLUTION INTRAVENOUS at 09:38

## 2025-03-07 RX ADMIN — PROPOFOL 50 MCG/KG/MIN: 10 INJECTION, EMULSION INTRAVENOUS at 09:41

## 2025-03-07 RX ADMIN — MIDAZOLAM 1 MG: 1 INJECTION INTRAMUSCULAR; INTRAVENOUS at 09:38

## 2025-03-07 ASSESSMENT — PAIN - FUNCTIONAL ASSESSMENT: PAIN_FUNCTIONAL_ASSESSMENT: NONE - DENIES PAIN

## 2025-03-07 NOTE — OP NOTE
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    Surgeons/Assistants: Dr. Michel Leos MD     Estimated Blood Loss: 3ml     Complications: none immediately appreciated    Procedure:    With the patient lying in the supine position and under adequate anesthesia per the anesthesia team, the area was anesthetized with a total of 19 ml of 1% Lidocaine 1:100,000 with epinephrine solution.  The area was then prepped and draped in the standard surgical fashion.  The previous biopsy site was excised with 1cm (to get 5mm necessary) macroscopically clear margins (due to the positive margins from original biopsy) full thickness into the subcutaneous tissue and sent for permanent pathologic evaluation.  This left a sizeable (at least 9cm2 defect), which could not be closed primarily due to tension.  Therefore, a 29cm2 (5cm x 4cm + 9cm2) sum of defect/adjacent tissue transfer proximally based rotation flap was then designed, back cut 5cm, the flap was elevated 4cm, transposed distally into the defect being inset with 3-0 Monocryl suture placed in interrupted buried fashion. The Burow's triangles were resected with several skin staples used to reinforce areas of tension.  Mastisol/steristrips were applied followed by bulky sterile dressings followed by bulky sterile dressings held in place with ACE bandage.  The patient tolerated the procedure quite well and remained hemodynamically stable throughout the procedure and was quite comfortable throughout the operative course.    Clinical staging for cancer

## 2025-03-07 NOTE — ANESTHESIA POSTPROCEDURE EVALUATION
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    Renata Phase I:      Renata Phase II:      Anesthesia Post Evaluation    Patient location during evaluation: bedside  Patient participation: complete - patient participated  Level of consciousness: awake  Pain score: 0  Airway patency: patent  Nausea & Vomiting: no vomiting and no nausea  Cardiovascular status: blood pressure returned to baseline  Respiratory status: acceptable  Hydration status: stable  Pain management: satisfactory to patient        No notable events documented.

## 2025-03-07 NOTE — H&P
[facility]  History and Physical Update    Pt Name: Rajendra Yanez  MRN: [id number]  YOB: 1955  Date of evaluation: 3/7/2025    I have examined the patient and reviewed the H&P/Consult and there are no changes to the patient or plans.      Michel Leos MD  Electronically signed 3/7/2025 at 9:30 AM

## 2025-03-07 NOTE — ANESTHESIA PRE PROCEDURE
Department of Anesthesiology  Preprocedure Note       Name:  Rajendra Yanez   Age:  69 y.o.  :  1955                                          MRN:  [id number]         Date:  3/7/2025      Surgeon: Surgeon(s):  Michel Leos MD    Procedure: Procedure(s):  WIDE EXCISION MELANOMA IN SITU LEFT DISTAL UPPER ARM    Medications prior to admission:   Prior to Admission medications    Medication Sig Start Date End Date Taking? Authorizing Provider   esomeprazole (NEXIUM) 20 MG delayed release capsule Take 1 capsule by mouth every morning (before breakfast)    Flaco James MD   vitamin D (VITAMIN D3) 50 MCG (2000) CAPS capsule Take 1 capsule by mouth daily    Flaco James MD   b complex vitamins capsule Take 1 capsule by mouth daily    Flaco James MD   medical marijuana Take by mouth nightly as needed. gummy    Flaco James MD   oxyBUTYnin (DITROPAN-XL) 10 MG extended release tablet Take 1 tablet by mouth daily 1/15/25   Ariel Marsh PA-C   tadalafil (CIALIS) 5 MG tablet Take 1 tablet by mouth daily 1/15/25   Ariel Marsh PA-C   tamsulosin (FLOMAX) 0.4 MG capsule Take 2 capsules by mouth daily 1/15/25   Ariel Marsh PA-C   diphenhydrAMINE-APAP, sleep, (ACETAMINOPHEN PM PO) nightly as needed    Flaco James MD   eszopiclone (LUNESTA) 1 MG TABS daily as needed. 24   Flaco James MD   Melatonin 10 MG TABS nightly as needed 19   Flaco James MD   polyethylene glycol (GLYCOLAX) 17 GM/SCOOP powder Take 17 g by mouth daily as needed    Flaco James MD   atorvastatin (LIPITOR) 10 MG tablet TAKE 1 TABLET BY MOUTH EVERY DAY 24   Shailesh Jin MD   Omega-3 Fatty Acids (FISH OIL) 875 MG CAPS Take 1 capsule by mouth daily    Flaco James MD   Simethicone 125 MG TABS as needed 3/20/21   Flaco James MD   Coenzyme Q10 (COQ-10) 100 MG CAPS Take by mouth daily    Flaco James MD   Probiotic Product

## 2025-03-07 NOTE — PROGRESS NOTES
1045 To recovery via cart. Spont resp. VSS. Report received from surgical rn. IV infusing KVO. Denies pain or nausea. Snack and drink given . Call bell in reach. Wife in room  1105 Discharge instructions given to pt and wife with each voicing understanding. IV discontinued. Up to dress with wife assist  1130 Discharge to home in stable ambulatory condition with wife

## 2025-03-07 NOTE — DISCHARGE INSTRUCTIONS
POST OPERATIVE INSTRUCTION SHEET  SKIN TUMOR/LESION REMOVAL              Activity:     No strenuous activity for 48 hours  No activity that stresses the suture closure/incision  Regular diet  ABSOLUTELY NO NICOTINE OF ANY TYPE     Wound Care:  Leave ace wrap in place for 48 hours. After 48 hours remove ace wrap  Steri-strips are in place, you should leave them over incision until they fall off.  If they fall off prior to your follow up appointment, cover incision with a band-aid  Apply cold compress over the next 36 hours. Do not place directly on skin. Do not leave on greater than 20 minutes at a time.  Keep all incisions clean  You may shower 36 hours after the operation. Use fingertips only when washing around incision, no washcloth. Pat dry.      Limitations:  No swimming, hot tub, sauna or soaking in a bathtub     Prescriptions:  Take exactly as prescribed  Complete antibiotic as prescribed, next dose tonight with food     Follow-Up: Tuesday 4/1/2025 at 8:15 am  Please come to your post op appointment at our office at this address: [address] [place] [address]        Notify our office if you experience any of the following:  Develop a fever (temperature is greater than 100.5F)  Develop redness greater than 1 cm around incision or red streaks up extremity  Have any excess bleeding/ increased drainage or swelling at the incision site       How Do you Prevent Surgical Site Infections?       *HAND WASHING     *STOP SHAVING   Wash your hands multiple times daily  Do not shave incisional area 48 hours before   with soap for 20 seconds.    or until 2 weeks after surgery.  This can   Before eating and wound care.   cause tiny cuts in the skin which can lead to infection.   After using the bathroom or touching pets.                                    *BALANCE      Times of activity and rest.      Stay away from people who may be sick.             *QUIT SMOKING      Cigarette smoking leads to slow wound      healing. 
33.5

## 2025-03-11 NOTE — TELEPHONE ENCOUNTER
Chief Complaint   Patient presents with    F/U BILAT. FEET/ NAIL CARE     Patient has chief complaint of bilateral painful nails.  Nails are quite thickened difficulty with shoe gear. Several feel ingrown.  Nails are quite elongated.  Neither missed an appointment recently.  Occasional tingling or numbness primarily with the right lower extremity.  This is back related.  Patient status post colon cancer.  No other new problems.no changes past medical history. No changes in review of systems.      Constitutional: Patient alert. No acute distress.  Psychiatric: Normal mood and affect.  HEENT: Sclera clear. Wearing glasses  Respiratory: Breathing unlabored.   Dermatologic: Multiple nails are hypertrophic crumbly and yellow.  They are painful.  Nails are elongated.  Mild onychocryptosis fourth digit right foot without any acute inflammation noted.  Onycholysis distally hallux nails noted.  This is baseline.  Mild xerosis cutis noted.  No pressure areas noted.  No ulcers are pre-ulcerative areas in either foot.  Web spaces are dry. No tinea pedis.   Pulses intact and symmetric. Feet warm to touch. No ulcers or pre ulcer areas.   Neurological: Gross sensation intact.  Monofilament testing intact though blunted on the right side.  Proprioception intact.    Motor function is grossly intact and symmetric.  No gross deformities noted.  No weakness apparent.     Impression: Painful onychomycosis bilateral.  Onychocryptosis fourth digit right foot adductovarus fifth digits. Xerosis cutis.      -The current clinical findings, treatment course, and current plan were discussed with the patient in detail. Various questions were answered at that time. If there is any interval changes or new problems the patient will advise. This dictation was done using voice recognition software and may contain some grammatical errors.      -Nail debridement was performed this was a greater than 6 nails. Nails were manually debrided. They were  Inpatient discharge follow up letter decreased and girth in length. Appropriate care was discussed. Preventative care was reviewed. Follow-up in about 2 months unless there is any other problems.     -Discussed seasonal footcare.    -Medicine note 3/10/2025 reviewed.    -Labs 9/12/2024 reviewed.

## 2025-03-14 ENCOUNTER — RESULTS FOLLOW-UP (OUTPATIENT)
Dept: FAMILY MEDICINE CLINIC | Age: 70
End: 2025-03-14

## 2025-03-14 DIAGNOSIS — N18.2 CKD (CHRONIC KIDNEY DISEASE) STAGE 2, GFR 60-89 ML/MIN: ICD-10-CM

## 2025-03-14 DIAGNOSIS — D69.6 THROMBOCYTOPENIA: Primary | ICD-10-CM

## 2025-03-14 DIAGNOSIS — D72.821 MONOCYTOSIS: ICD-10-CM

## 2025-03-17 NOTE — TELEPHONE ENCOUNTER
Component      Latest Ref Rng 2/20/2025   WBC      4.4 - 10.5 th/cmm 6.2    RBC      4.50 - 6.00 mil/cmm 4.72    Hemoglobin Quant      13.5 - 16.5 gm/dL 14.7    Hematocrit      40.0 - 49.0 % 43.1    MCV      80 - 97 CU HEATHER 91.4    MCH      27.5 - 33.0 PG 31.1    MCHC      33.0 - 36.0 gm/dL 34.0    RDW      12.0 - 16.0 % 12.7    Platelet Count      150 - 400 th/cmm 139 (L)    Neutrophils %      40 - 70 % 53.9    Lymphocyte %      15 - 45 % 25.6    Monocytes %      2 - 10 % 16.9 (H)    Eosinophils %      0 - 6 % 3.0    Basophils %      0 - 2 % 0.6    Nucleated Red Blood Cells      <1 /100 WBC 0.1    Neutrophils Absolute      1,800 - 7,700 /cmm 3,400    Lymphocytes Absolute      1,000 - 4,800 /cmm 1,600    Monocytes Absolute      0 - 800 /cmm 1,100 (H)    Eosinophils Absolute      0 - 500 /cmm 200    Basophils Absolute      0 - 200 /cmm 0    Sodium      135 - 145 mEq/L 140    Potassium      3.6 - 5.0 mEq/L 4.2    Chloride      101 - 111 mEq/L 102    CARBON DIOXIDE      21 - 32 mEq/L 33 (H)    Anion Gap      4 - 12  5    Glucose      70 - 110 mg/dL 98    BUN,BUNPL      7 - 20 mg/dL 12    Creatinine      0.60 - 1.30 mg/dL 0.96    Creatinine Clearance >60    Calcium      8.8 - 10.5 mg/dL 9.10       Legend:  (L) Low  (H) High    Discussion noted.  Given slight thrombocytopenia and monocytosis, will recheck CBC and BMP at this time, as it has been approximately 4 weeks since blood draw.  Patient notified via [employer].  ES

## 2025-03-19 ENCOUNTER — RESULTS FOLLOW-UP (OUTPATIENT)
Dept: FAMILY MEDICINE CLINIC | Age: 70
End: 2025-03-19

## 2025-03-19 ENCOUNTER — LAB (OUTPATIENT)
Dept: LAB | Age: 70
End: 2025-03-19

## 2025-03-19 DIAGNOSIS — D69.6 THROMBOCYTOPENIA: ICD-10-CM

## 2025-03-19 DIAGNOSIS — D72.821 MONOCYTOSIS: ICD-10-CM

## 2025-03-19 DIAGNOSIS — N18.2 CKD (CHRONIC KIDNEY DISEASE) STAGE 2, GFR 60-89 ML/MIN: ICD-10-CM

## 2025-03-19 LAB
ANION GAP SERPL CALC-SCNC: 7 MEQ/L (ref 8–16)
BASOPHILS ABSOLUTE: 0 THOU/MM3 (ref 0–0.1)
BASOPHILS NFR BLD AUTO: 0.6 %
BUN SERPL-MCNC: 15 MG/DL (ref 8–23)
CALCIUM SERPL-MCNC: 9.3 MG/DL (ref 8.8–10.2)
CHLORIDE SERPL-SCNC: 102 MEQ/L (ref 98–111)
CO2 SERPL-SCNC: 29 MEQ/L (ref 22–29)
CREAT SERPL-MCNC: 1 MG/DL (ref 0.7–1.2)
DEPRECATED RDW RBC AUTO: 44.7 FL (ref 35–45)
EOSINOPHIL NFR BLD AUTO: 1.7 %
EOSINOPHILS ABSOLUTE: 0.1 THOU/MM3 (ref 0–0.4)
ERYTHROCYTE [DISTWIDTH] IN BLOOD BY AUTOMATED COUNT: 12.9 % (ref 11.5–14.5)
GFR SERPL CREATININE-BSD FRML MDRD: 81 ML/MIN/1.73M2
GLUCOSE SERPL-MCNC: 153 MG/DL (ref 74–109)
HCT VFR BLD AUTO: 43.5 % (ref 42–52)
HGB BLD-MCNC: 14.2 GM/DL (ref 14–18)
IMM GRANULOCYTES # BLD AUTO: 0.01 THOU/MM3 (ref 0–0.07)
IMM GRANULOCYTES NFR BLD AUTO: 0.2 %
LYMPHOCYTES ABSOLUTE: 1 THOU/MM3 (ref 1–4.8)
LYMPHOCYTES NFR BLD AUTO: 21.5 %
MCH RBC QN AUTO: 31.1 PG (ref 26–33)
MCHC RBC AUTO-ENTMCNC: 32.6 GM/DL (ref 32.2–35.5)
MCV RBC AUTO: 95.4 FL (ref 80–94)
MONOCYTES ABSOLUTE: 0.4 THOU/MM3 (ref 0.4–1.3)
MONOCYTES NFR BLD AUTO: 8.3 %
NEUTROPHILS ABSOLUTE: 3.2 THOU/MM3 (ref 1.8–7.7)
NEUTROPHILS NFR BLD AUTO: 67.7 %
NRBC BLD AUTO-RTO: 0 /100 WBC
PLATELET # BLD AUTO: 131 THOU/MM3 (ref 130–400)
PMV BLD AUTO: 11.5 FL (ref 9.4–12.4)
POTASSIUM SERPL-SCNC: 4.2 MEQ/L (ref 3.5–5.2)
RBC # BLD AUTO: 4.56 MILL/MM3 (ref 4.7–6.1)
SODIUM SERPL-SCNC: 138 MEQ/L (ref 135–145)
WBC # BLD AUTO: 4.8 THOU/MM3 (ref 4.8–10.8)

## 2025-03-25 NOTE — TELEPHONE ENCOUNTER
----- Message from Dr. Shailesh Jin MD sent at 3/19/2025  3:42 PM EDT -----  Notify pt-   Results reviewed.   BMP okay, except glucose mildly elevated at 153-was patient fasting at time of blood draw?  If so, may need to check HG A1c at this time.  CBC okay overall  Follow-up as planned.  ES

## 2025-04-09 ENCOUNTER — TELEPHONE (OUTPATIENT)
Dept: PULMONOLOGY | Age: 70
End: 2025-04-09

## 2025-04-09 DIAGNOSIS — G47.09 OTHER INSOMNIA: Primary | ICD-10-CM

## 2025-04-09 RX ORDER — ESZOPICLONE 1 MG/1
1 TABLET, FILM COATED ORAL DAILY PRN
Qty: 30 TABLET | Refills: 0 | Status: SHIPPED | OUTPATIENT
Start: 2025-04-09 | End: 2025-05-09

## 2025-04-09 NOTE — TELEPHONE ENCOUNTER
This was an Ade patient... Patient is needing a refill of his Lunesta, but he is not scheduled to see Zurdo until 7/30/25. Please advise thank you

## 2025-07-29 NOTE — PROGRESS NOTES
Max Daily Amount: 1 mg      esomeprazole (NEXIUM) 20 MG delayed release capsule Take 1 capsule by mouth every morning (before breakfast)      vitamin D (VITAMIN D3) 50 MCG (2000 UT) CAPS capsule Take 1 capsule by mouth daily      b complex vitamins capsule Take 1 capsule by mouth daily      medical marijuana Take by mouth nightly as needed. gummy      oxyBUTYnin (DITROPAN-XL) 10 MG extended release tablet Take 1 tablet by mouth daily 90 tablet 3    tadalafil (CIALIS) 5 MG tablet Take 1 tablet by mouth daily 90 tablet 3    tamsulosin (FLOMAX) 0.4 MG capsule Take 2 capsules by mouth daily 180 capsule 3    diphenhydrAMINE-APAP, sleep, (ACETAMINOPHEN PM PO) nightly as needed      Melatonin 10 MG TABS nightly as needed      polyethylene glycol (GLYCOLAX) 17 GM/SCOOP powder Take 17 g by mouth daily as needed      atorvastatin (LIPITOR) 10 MG tablet TAKE 1 TABLET BY MOUTH EVERY DAY 90 tablet 3    Omega-3 Fatty Acids (FISH OIL) 875 MG CAPS Take 1 capsule by mouth daily      Simethicone 125 MG TABS as needed      Coenzyme Q10 (COQ-10) 100 MG CAPS Take by mouth daily      Probiotic Product (PROBIOTIC PO) Take by mouth daily       fluticasone (FLONASE) 50 MCG/ACT nasal spray 2 sprays by Nasal route daily. 3 Bottle 3    therapeutic multivitamin-minerals (THERAGRAN-M) tablet Take 1 tablet by mouth daily occasionally       No current facility-administered medications for this visit.       Review of systems     Review of Systems   Constitutional:  Negative for appetite change and fever.   HENT:  Negative for congestion, postnasal drip, rhinorrhea, sneezing and sore throat.    Respiratory:  Negative for cough, shortness of breath and wheezing.    Cardiovascular:  Positive for chest pain (?  GERD).   Gastrointestinal:         GERD          Physical exam     BMI:  Body mass index is 29.05 kg/m².    Wt Readings from Last 3 Encounters:   07/30/25 84.1 kg (185 lb 8 oz)   03/07/25 81.6 kg (180 lb)   01/15/25 82.6 kg (182 lb)     Vitals:

## 2025-07-30 ENCOUNTER — OFFICE VISIT (OUTPATIENT)
Age: 70
End: 2025-07-30

## 2025-07-30 VITALS
BODY MASS INDEX: 29.11 KG/M2 | HEART RATE: 65 BPM | DIASTOLIC BLOOD PRESSURE: 70 MMHG | OXYGEN SATURATION: 94 % | TEMPERATURE: 98.9 F | SYSTOLIC BLOOD PRESSURE: 118 MMHG | WEIGHT: 185.5 LBS | HEIGHT: 67 IN

## 2025-07-30 DIAGNOSIS — E66.3 OVERWEIGHT (BMI 25.0-29.9): ICD-10-CM

## 2025-07-30 DIAGNOSIS — G47.33 OSA ON CPAP: Primary | ICD-10-CM

## 2025-07-30 DIAGNOSIS — K21.9 GASTROESOPHAGEAL REFLUX DISEASE, UNSPECIFIED WHETHER ESOPHAGITIS PRESENT: ICD-10-CM

## 2025-07-30 RX ORDER — LORATADINE 10 MG/1
10 CAPSULE, LIQUID FILLED ORAL DAILY
COMMUNITY

## 2025-07-30 RX ORDER — ESZOPICLONE 1 MG/1
1 TABLET, FILM COATED ORAL NIGHTLY
COMMUNITY

## 2025-07-30 RX ORDER — VITAMIN B COMPLEX
1 CAPSULE ORAL DAILY
COMMUNITY

## 2025-07-30 ASSESSMENT — ENCOUNTER SYMPTOMS
WHEEZING: 0
RHINORRHEA: 0
SORE THROAT: 0
SHORTNESS OF BREATH: 0
COUGH: 0

## 2025-08-04 ENCOUNTER — HOSPITAL ENCOUNTER (OUTPATIENT)
Dept: RESPIRATORY THERAPY | Age: 70
Discharge: HOME | End: 2025-08-04
Payer: MEDICARE

## 2025-08-04 DIAGNOSIS — G47.33 OSA ON CPAP: ICD-10-CM

## 2025-08-04 PROCEDURE — 94762 N-INVAS EAR/PLS OXIMTRY CONT: CPT

## 2025-08-06 ENCOUNTER — RESULTS FOLLOW-UP (OUTPATIENT)
Age: 70
End: 2025-08-06

## (undated) DEVICE — BANDAGE,GAUZE,4.5"X4.1YD,STERILE,LF: Brand: MEDLINE

## (undated) DEVICE — GLOVE ORANGE PI 7   MSG9070

## (undated) DEVICE — VESSEL SEALER EXTEND: Brand: ENDOWRIST

## (undated) DEVICE — SOLUTION IV 1000ML 0.45% SOD CHL PH 5 INJ USP VIAFLX PLAS

## (undated) DEVICE — BRAVO CF CAPSULE  DELIVERY DEV, 5-PK: Brand: BRAVO

## (undated) DEVICE — GLOVE ORTHO 8   MSG9480

## (undated) DEVICE — ARM DRAPE

## (undated) DEVICE — COLUMN DRAPE

## (undated) DEVICE — TROCAR: Brand: KII FIOS FIRST ENTRY

## (undated) DEVICE — ELECTRO LUBE IS A SINGLE PATIENT USE DEVICE THAT IS INTENDED TO BE USED ON ELECTROSURGICAL ELECTRODES TO REDUCE STICKING.: Brand: KEY SURGICAL ELECTRO LUBE

## (undated) DEVICE — BLADELESS OBTURATOR: Brand: WECK VISTA

## (undated) DEVICE — SUTURE VCRL + SZ 0 L27IN ABSRB VLT L26MM UR-6 5/8 CIR VCP603H

## (undated) DEVICE — Device

## (undated) DEVICE — SOLUTION ANTIFOG VIS SYS CLEARIFY LAPSCP

## (undated) DEVICE — TUBING, SUCTION, 1/4" X 20', STRAIGHT: Brand: MEDLINE INDUSTRIES, INC.

## (undated) DEVICE — 4-PORT MANIFOLD: Brand: NEPTUNE 2

## (undated) DEVICE — 35 ML SYRINGE LUER-LOCK TIP: Brand: MONOJECT

## (undated) DEVICE — BANDAGE ADH W1XL3IN NAT FAB WVN FLX DURABLE N ADH PD SEAL

## (undated) DEVICE — 3M™ STERI-STRIP™ COMPOUND BENZOIN TINCTURE 40 BAGS/CARTON 4 CARTONS/CASE C1544: Brand: 3M™ STERI-STRIP™

## (undated) DEVICE — KIT INF CTRL 2OZ LUB TBNG L12FT DBL END BRSH SYR OP4

## (undated) DEVICE — BASIC SINGLE BASIN BTC-LF: Brand: MEDLINE INDUSTRIES, INC.

## (undated) DEVICE — LINER SUCT CANSTR 1500CC SEMI RIG W/ POR HYDROPHOBIC SHUT

## (undated) DEVICE — GLOVE ORTHO 7 1/2   MSG9475

## (undated) DEVICE — SUTURE NONABSORBABLE BRAIDED 4-0 SH 30 IN BLK PERMA HND K831H

## (undated) DEVICE — TIP COVER ACCESSORY

## (undated) DEVICE — COTTON BALL ST

## (undated) DEVICE — GARMENT,MEDLINE,DVT,INT,CALF,MED, GEN2: Brand: MEDLINE

## (undated) DEVICE — SUTURE ETHBND EXCEL SZ 0 L30IN NONABSORBABLE GRN CT1 L36MM X424H

## (undated) DEVICE — TUBING, SUCTION, 1/4" X 6', STRAIGHT: Brand: MEDLINE

## (undated) DEVICE — SET LNR RED GRN W/ BASE CLEANASCOPE

## (undated) DEVICE — PUMP SUC IRR TBNG L10FT W/ HNDPC ASSEMB STRYKEFLOW 2

## (undated) DEVICE — SOLUTION IV IRRIG WATER 500ML POUR BRL ST 2F7113

## (undated) DEVICE — CATHETER ETER IV 22GA L1IN POLYUR STR RADPQ INTROCAN SFTY

## (undated) DEVICE — GLOVE SURG SZ 8 L11.77IN FNGR THK9.8MIL STRW LTX POLYMER

## (undated) DEVICE — GENERAL LAPAROSCOPY PACK-LF: Brand: MEDLINE INDUSTRIES, INC.

## (undated) DEVICE — TUBING IV STOPCOCK 48 CM 3 W

## (undated) DEVICE — ETHICON LINX ESOPHAGUS SIZING TOOL: Brand: ETHICON LINX ESOPHAGUS SIZING TOOL

## (undated) DEVICE — TUBING INSUFFLATOR HEAT HUMIDIFIED SMK EVAC SET PNEUMOCLEAR

## (undated) DEVICE — GLOVE ORANGE PI 8   MSG9080

## (undated) DEVICE — BANDAGE COMPR M W4INXL10YD WHT BGE VELC E MTRX HK AND LOOP

## (undated) DEVICE — YANKAUER,BULB TIP,W/O VENT,RIGID,STERILE: Brand: MEDLINE

## (undated) DEVICE — CONMED SCOPE SAVER BITE BLOCK, 20X27 MM: Brand: SCOPE SAVER

## (undated) DEVICE — INSUFFLATION NEEDLE TO ESTABLISH PNEUMOPERITONEUM.: Brand: INSUFFLATION NEEDLE

## (undated) DEVICE — SUTURE MONOCRYL SZ 4-0 L18IN ABSRB UD P-3 L13MM 3/8 CIR PRIM Y494G

## (undated) DEVICE — SUTURE ETHBND EXCEL SZ 0 L36IN NONABSORBABLE GRN SH L26MM X524H

## (undated) DEVICE — TETRA-FLEX CF WOVEN LATEX FREE ELASTIC BANDAGE 6" X 5.5 YD: Brand: TETRA-FLEX™CF

## (undated) DEVICE — CANNULA SEAL

## (undated) DEVICE — SUTURE VCRL + SZ 3-0 L27IN ABSRB UD L26MM SH 1/2 CIR VCP416H

## (undated) DEVICE — IV START KIT: Brand: MEDLINE INDUSTRIES, INC.

## (undated) DEVICE — GOWN,SIRUS,NON REINFRCD,LARGE,SET IN SL: Brand: MEDLINE

## (undated) DEVICE — SUTURE VCRL SZ 2-0 L27IN ABSRB UD L26MM SH 1/2 CIR J417H

## (undated) DEVICE — CHLORAPREP 26ML CLEAR

## (undated) DEVICE — SET ADMIN 25ML L117IN PMP MOD CK VLV RLER CLMP 2 SMRTSITE